# Patient Record
Sex: FEMALE | Race: WHITE | NOT HISPANIC OR LATINO | Employment: FULL TIME | URBAN - METROPOLITAN AREA
[De-identification: names, ages, dates, MRNs, and addresses within clinical notes are randomized per-mention and may not be internally consistent; named-entity substitution may affect disease eponyms.]

---

## 2017-01-04 ENCOUNTER — GENERIC CONVERSION - ENCOUNTER (OUTPATIENT)
Dept: OTHER | Facility: OTHER | Age: 47
End: 2017-01-04

## 2017-01-28 ENCOUNTER — LAB (OUTPATIENT)
Dept: LAB | Facility: HOSPITAL | Age: 47
End: 2017-01-28
Attending: OBSTETRICS & GYNECOLOGY
Payer: COMMERCIAL

## 2017-01-28 ENCOUNTER — TRANSCRIBE ORDERS (OUTPATIENT)
Dept: LAB | Facility: HOSPITAL | Age: 47
End: 2017-01-28

## 2017-01-28 DIAGNOSIS — Z01.818 OTHER SPECIFIED PRE-OPERATIVE EXAMINATION: ICD-10-CM

## 2017-01-28 DIAGNOSIS — Z01.818 OTHER SPECIFIED PRE-OPERATIVE EXAMINATION: Primary | ICD-10-CM

## 2017-01-28 LAB
ABO GROUP BLD: NORMAL
APTT PPP: 26 SECONDS (ref 24–36)
BASOPHILS # BLD AUTO: 0.02 THOUSANDS/ΜL (ref 0–0.1)
BASOPHILS NFR BLD AUTO: 0 % (ref 0–1)
BLD GP AB SCN SERPL QL: NEGATIVE
EOSINOPHIL # BLD AUTO: 0.22 THOUSAND/ΜL (ref 0–0.61)
EOSINOPHIL NFR BLD AUTO: 4 % (ref 0–6)
ERYTHROCYTE [DISTWIDTH] IN BLOOD BY AUTOMATED COUNT: 12.9 % (ref 11.6–15.1)
HCT VFR BLD AUTO: 39.7 % (ref 34.8–46.1)
HGB BLD-MCNC: 13.6 G/DL (ref 11.5–15.4)
INR PPP: 0.98 (ref 0.86–1.16)
LYMPHOCYTES # BLD AUTO: 1.48 THOUSANDS/ΜL (ref 0.6–4.47)
LYMPHOCYTES NFR BLD AUTO: 26 % (ref 14–44)
MCH RBC QN AUTO: 30 PG (ref 26.8–34.3)
MCHC RBC AUTO-ENTMCNC: 34.3 G/DL (ref 31.4–37.4)
MCV RBC AUTO: 88 FL (ref 82–98)
MONOCYTES # BLD AUTO: 0.48 THOUSAND/ΜL (ref 0.17–1.22)
MONOCYTES NFR BLD AUTO: 9 % (ref 4–12)
NEUTROPHILS # BLD AUTO: 3.47 THOUSANDS/ΜL (ref 1.85–7.62)
NEUTS SEG NFR BLD AUTO: 61 % (ref 43–75)
NRBC BLD AUTO-RTO: 0 /100 WBCS
PLATELET # BLD AUTO: 272 THOUSANDS/UL (ref 149–390)
PMV BLD AUTO: 12.2 FL (ref 8.9–12.7)
PROTHROMBIN TIME: 13.1 SECONDS (ref 12–14.3)
RBC # BLD AUTO: 4.53 MILLION/UL (ref 3.81–5.12)
RH BLD: POSITIVE
WBC # BLD AUTO: 5.68 THOUSAND/UL (ref 4.31–10.16)

## 2017-01-28 PROCEDURE — 86850 RBC ANTIBODY SCREEN: CPT

## 2017-01-28 PROCEDURE — 85610 PROTHROMBIN TIME: CPT

## 2017-01-28 PROCEDURE — 86920 COMPATIBILITY TEST SPIN: CPT

## 2017-01-28 PROCEDURE — 86900 BLOOD TYPING SEROLOGIC ABO: CPT

## 2017-01-28 PROCEDURE — 86901 BLOOD TYPING SEROLOGIC RH(D): CPT

## 2017-01-28 PROCEDURE — 36415 COLL VENOUS BLD VENIPUNCTURE: CPT

## 2017-01-28 PROCEDURE — 85025 COMPLETE CBC W/AUTO DIFF WBC: CPT

## 2017-01-28 PROCEDURE — 85730 THROMBOPLASTIN TIME PARTIAL: CPT

## 2017-01-31 ENCOUNTER — GENERIC CONVERSION - ENCOUNTER (OUTPATIENT)
Dept: OTHER | Facility: OTHER | Age: 47
End: 2017-01-31

## 2017-02-08 ENCOUNTER — ANESTHESIA EVENT (OUTPATIENT)
Dept: PERIOP | Facility: HOSPITAL | Age: 47
End: 2017-02-08
Payer: COMMERCIAL

## 2017-02-09 ENCOUNTER — ANESTHESIA (OUTPATIENT)
Dept: PERIOP | Facility: HOSPITAL | Age: 47
End: 2017-02-09
Payer: COMMERCIAL

## 2017-02-09 ENCOUNTER — HOSPITAL ENCOUNTER (OUTPATIENT)
Facility: HOSPITAL | Age: 47
Setting detail: OUTPATIENT SURGERY
Discharge: HOME | End: 2017-02-09
Attending: OBSTETRICS & GYNECOLOGY | Admitting: OBSTETRICS & GYNECOLOGY
Payer: COMMERCIAL

## 2017-02-09 VITALS
WEIGHT: 190 LBS | TEMPERATURE: 98.7 F | OXYGEN SATURATION: 97 % | BODY MASS INDEX: 29.82 KG/M2 | RESPIRATION RATE: 16 BRPM | HEIGHT: 67 IN | DIASTOLIC BLOOD PRESSURE: 78 MMHG | SYSTOLIC BLOOD PRESSURE: 133 MMHG | HEART RATE: 59 BPM

## 2017-02-09 DIAGNOSIS — N92.0 MENORRHAGIA: ICD-10-CM

## 2017-02-09 LAB — EXT PREGNANCY TEST URINE: NORMAL

## 2017-02-09 PROCEDURE — 88305 TISSUE EXAM BY PATHOLOGIST: CPT | Performed by: OBSTETRICS & GYNECOLOGY

## 2017-02-09 PROCEDURE — 81025 URINE PREGNANCY TEST: CPT | Performed by: OBSTETRICS & GYNECOLOGY

## 2017-02-09 RX ORDER — IBUPROFEN 400 MG/1
TABLET ORAL
Status: DISCONTINUED
Start: 2017-02-09 | End: 2017-02-09 | Stop reason: HOSPADM

## 2017-02-09 RX ORDER — KETOROLAC TROMETHAMINE 30 MG/ML
INJECTION, SOLUTION INTRAMUSCULAR; INTRAVENOUS AS NEEDED
Status: DISCONTINUED | OUTPATIENT
Start: 2017-02-09 | End: 2017-02-09 | Stop reason: SURG

## 2017-02-09 RX ORDER — ONDANSETRON 2 MG/ML
4 INJECTION INTRAMUSCULAR; INTRAVENOUS EVERY 6 HOURS PRN
Status: DISCONTINUED | OUTPATIENT
Start: 2017-02-09 | End: 2017-02-09 | Stop reason: HOSPADM

## 2017-02-09 RX ORDER — FENTANYL CITRATE/PF 50 MCG/ML
25 SYRINGE (ML) INJECTION
Status: DISCONTINUED | OUTPATIENT
Start: 2017-02-09 | End: 2017-02-09 | Stop reason: HOSPADM

## 2017-02-09 RX ORDER — FENTANYL CITRATE 50 UG/ML
INJECTION, SOLUTION INTRAMUSCULAR; INTRAVENOUS AS NEEDED
Status: DISCONTINUED | OUTPATIENT
Start: 2017-02-09 | End: 2017-02-09 | Stop reason: SURG

## 2017-02-09 RX ORDER — SODIUM CHLORIDE, SODIUM LACTATE, POTASSIUM CHLORIDE, CALCIUM CHLORIDE 600; 310; 30; 20 MG/100ML; MG/100ML; MG/100ML; MG/100ML
50 INJECTION, SOLUTION INTRAVENOUS CONTINUOUS
Status: DISCONTINUED | OUTPATIENT
Start: 2017-02-09 | End: 2017-02-09 | Stop reason: HOSPADM

## 2017-02-09 RX ORDER — ONDANSETRON 2 MG/ML
4 INJECTION INTRAMUSCULAR; INTRAVENOUS ONCE AS NEEDED
Status: DISCONTINUED | OUTPATIENT
Start: 2017-02-09 | End: 2017-02-09 | Stop reason: HOSPADM

## 2017-02-09 RX ORDER — LIDOCAINE HYDROCHLORIDE 10 MG/ML
INJECTION, SOLUTION INFILTRATION; PERINEURAL AS NEEDED
Status: DISCONTINUED | OUTPATIENT
Start: 2017-02-09 | End: 2017-02-09 | Stop reason: SURG

## 2017-02-09 RX ORDER — IBUPROFEN 800 MG/1
800 TABLET ORAL EVERY 6 HOURS PRN
Qty: 20 TABLET | Refills: 0 | Status: SHIPPED | OUTPATIENT
Start: 2017-02-09 | End: 2017-08-04 | Stop reason: ALTCHOICE

## 2017-02-09 RX ORDER — PROPOFOL 10 MG/ML
INJECTION, EMULSION INTRAVENOUS AS NEEDED
Status: DISCONTINUED | OUTPATIENT
Start: 2017-02-09 | End: 2017-02-09 | Stop reason: SURG

## 2017-02-09 RX ORDER — ONDANSETRON 2 MG/ML
INJECTION INTRAMUSCULAR; INTRAVENOUS AS NEEDED
Status: DISCONTINUED | OUTPATIENT
Start: 2017-02-09 | End: 2017-02-09 | Stop reason: SURG

## 2017-02-09 RX ORDER — IBUPROFEN 400 MG/1
800 TABLET ORAL EVERY 6 HOURS PRN
Status: DISCONTINUED | OUTPATIENT
Start: 2017-02-09 | End: 2017-02-09 | Stop reason: HOSPADM

## 2017-02-09 RX ORDER — MIDAZOLAM HYDROCHLORIDE 1 MG/ML
INJECTION INTRAMUSCULAR; INTRAVENOUS AS NEEDED
Status: DISCONTINUED | OUTPATIENT
Start: 2017-02-09 | End: 2017-02-09 | Stop reason: SURG

## 2017-02-09 RX ADMIN — SODIUM CHLORIDE, SODIUM LACTATE, POTASSIUM CHLORIDE, AND CALCIUM CHLORIDE 50 ML/HR: .6; .31; .03; .02 INJECTION, SOLUTION INTRAVENOUS at 09:57

## 2017-02-09 RX ADMIN — ONDANSETRON 4 MG: 2 INJECTION INTRAMUSCULAR; INTRAVENOUS at 12:05

## 2017-02-09 RX ADMIN — FENTANYL CITRATE 25 MCG: 50 INJECTION, SOLUTION INTRAMUSCULAR; INTRAVENOUS at 12:05

## 2017-02-09 RX ADMIN — MIDAZOLAM HYDROCHLORIDE 2 MG: 1 INJECTION, SOLUTION INTRAMUSCULAR; INTRAVENOUS at 11:49

## 2017-02-09 RX ADMIN — FENTANYL CITRATE 25 MCG: 50 INJECTION, SOLUTION INTRAMUSCULAR; INTRAVENOUS at 12:02

## 2017-02-09 RX ADMIN — PROPOFOL 200 MG: 10 INJECTION, EMULSION INTRAVENOUS at 11:56

## 2017-02-09 RX ADMIN — DEXAMETHASONE SODIUM PHOSPHATE 8 MG: 10 INJECTION INTRAMUSCULAR; INTRAVENOUS at 12:05

## 2017-02-09 RX ADMIN — KETOROLAC TROMETHAMINE 30 MG: 30 INJECTION, SOLUTION INTRAMUSCULAR at 12:19

## 2017-02-09 RX ADMIN — LIDOCAINE HYDROCHLORIDE 50 MG: 10 INJECTION, SOLUTION INFILTRATION; PERINEURAL at 11:56

## 2017-02-09 RX ADMIN — IBUPROFEN 800 MG: 400 TABLET ORAL at 14:02

## 2017-02-09 RX ADMIN — FENTANYL CITRATE 25 MCG: 50 INJECTION, SOLUTION INTRAMUSCULAR; INTRAVENOUS at 11:59

## 2017-02-09 RX ADMIN — FENTANYL CITRATE 25 MCG: 50 INJECTION, SOLUTION INTRAMUSCULAR; INTRAVENOUS at 12:18

## 2017-02-09 RX ADMIN — FENTANYL CITRATE 25 MCG: 50 INJECTION INTRAMUSCULAR; INTRAVENOUS at 12:45

## 2017-02-11 LAB
ABO GROUP BLD BPU: NORMAL
ABO GROUP BLD BPU: NORMAL
BPU ID: NORMAL
BPU ID: NORMAL
CROSSMATCH: NORMAL
CROSSMATCH: NORMAL
UNIT DISPENSE STATUS: NORMAL
UNIT DISPENSE STATUS: NORMAL
UNIT PRODUCT CODE: NORMAL
UNIT PRODUCT CODE: NORMAL
UNIT RH: NORMAL
UNIT RH: NORMAL

## 2017-02-13 ENCOUNTER — GENERIC CONVERSION - ENCOUNTER (OUTPATIENT)
Dept: OTHER | Facility: OTHER | Age: 47
End: 2017-02-13

## 2017-02-24 ENCOUNTER — ALLSCRIPTS OFFICE VISIT (OUTPATIENT)
Dept: OTHER | Facility: OTHER | Age: 47
End: 2017-02-24

## 2017-04-10 ENCOUNTER — GENERIC CONVERSION - ENCOUNTER (OUTPATIENT)
Dept: OTHER | Facility: OTHER | Age: 47
End: 2017-04-10

## 2017-05-15 ENCOUNTER — ALLSCRIPTS OFFICE VISIT (OUTPATIENT)
Dept: OTHER | Facility: OTHER | Age: 47
End: 2017-05-15

## 2017-05-15 PROCEDURE — G0145 SCR C/V CYTO,THINLAYER,RESCR: HCPCS | Performed by: OBSTETRICS & GYNECOLOGY

## 2017-05-16 ENCOUNTER — LAB REQUISITION (OUTPATIENT)
Dept: LAB | Facility: HOSPITAL | Age: 47
End: 2017-05-16
Payer: COMMERCIAL

## 2017-05-16 DIAGNOSIS — Z01.419 ENCOUNTER FOR GYNECOLOGICAL EXAMINATION WITHOUT ABNORMAL FINDING: ICD-10-CM

## 2017-05-22 LAB
LAB AP GYN PRIMARY INTERPRETATION: NORMAL
LAB AP LMP: NORMAL
Lab: NORMAL

## 2017-05-23 ENCOUNTER — GENERIC CONVERSION - ENCOUNTER (OUTPATIENT)
Dept: OTHER | Facility: OTHER | Age: 47
End: 2017-05-23

## 2017-07-24 ENCOUNTER — GENERIC CONVERSION - ENCOUNTER (OUTPATIENT)
Dept: OTHER | Facility: OTHER | Age: 47
End: 2017-07-24

## 2017-07-25 ENCOUNTER — TRANSCRIBE ORDERS (OUTPATIENT)
Dept: LAB | Facility: CLINIC | Age: 47
End: 2017-07-25

## 2017-07-25 ENCOUNTER — APPOINTMENT (OUTPATIENT)
Dept: LAB | Facility: CLINIC | Age: 47
End: 2017-07-25
Payer: COMMERCIAL

## 2017-07-25 DIAGNOSIS — N92.0 EXCESSIVE OR FREQUENT MENSTRUATION: ICD-10-CM

## 2017-07-25 DIAGNOSIS — N92.0 EXCESSIVE OR FREQUENT MENSTRUATION: Primary | ICD-10-CM

## 2017-07-25 LAB
BASOPHILS # BLD AUTO: 0.04 THOUSANDS/ΜL (ref 0–0.1)
BASOPHILS NFR BLD AUTO: 1 % (ref 0–1)
EOSINOPHIL # BLD AUTO: 0.26 THOUSAND/ΜL (ref 0–0.61)
EOSINOPHIL NFR BLD AUTO: 3 % (ref 0–6)
ERYTHROCYTE [DISTWIDTH] IN BLOOD BY AUTOMATED COUNT: 12.8 % (ref 11.6–15.1)
HCT VFR BLD AUTO: 43.8 % (ref 34.8–46.1)
HGB BLD-MCNC: 14.8 G/DL (ref 11.5–15.4)
LYMPHOCYTES # BLD AUTO: 1.65 THOUSANDS/ΜL (ref 0.6–4.47)
LYMPHOCYTES NFR BLD AUTO: 21 % (ref 14–44)
MCH RBC QN AUTO: 29.3 PG (ref 26.8–34.3)
MCHC RBC AUTO-ENTMCNC: 33.8 G/DL (ref 31.4–37.4)
MCV RBC AUTO: 87 FL (ref 82–98)
MONOCYTES # BLD AUTO: 0.75 THOUSAND/ΜL (ref 0.17–1.22)
MONOCYTES NFR BLD AUTO: 9 % (ref 4–12)
NEUTROPHILS # BLD AUTO: 5.34 THOUSANDS/ΜL (ref 1.85–7.62)
NEUTS SEG NFR BLD AUTO: 66 % (ref 43–75)
PLATELET # BLD AUTO: 298 THOUSANDS/UL (ref 149–390)
PMV BLD AUTO: 10.9 FL (ref 8.9–12.7)
RBC # BLD AUTO: 5.05 MILLION/UL (ref 3.81–5.12)
WBC # BLD AUTO: 8.04 THOUSAND/UL (ref 4.31–10.16)

## 2017-07-25 PROCEDURE — 36415 COLL VENOUS BLD VENIPUNCTURE: CPT

## 2017-07-25 PROCEDURE — 85025 COMPLETE CBC W/AUTO DIFF WBC: CPT

## 2017-07-26 ENCOUNTER — GENERIC CONVERSION - ENCOUNTER (OUTPATIENT)
Dept: OTHER | Facility: OTHER | Age: 47
End: 2017-07-26

## 2017-08-04 ENCOUNTER — HOSPITAL ENCOUNTER (EMERGENCY)
Facility: HOSPITAL | Age: 47
Discharge: HOME | End: 2017-08-04
Attending: EMERGENCY MEDICINE | Admitting: EMERGENCY MEDICINE
Payer: COMMERCIAL

## 2017-08-04 ENCOUNTER — APPOINTMENT (EMERGENCY)
Dept: RADIOLOGY | Facility: HOSPITAL | Age: 47
End: 2017-08-04
Payer: COMMERCIAL

## 2017-08-04 ENCOUNTER — LAB CONVERSION - ENCOUNTER (OUTPATIENT)
Dept: OTHER | Facility: OTHER | Age: 47
End: 2017-08-04

## 2017-08-04 VITALS
DIASTOLIC BLOOD PRESSURE: 65 MMHG | RESPIRATION RATE: 16 BRPM | WEIGHT: 200.62 LBS | SYSTOLIC BLOOD PRESSURE: 131 MMHG | TEMPERATURE: 98.2 F | BODY MASS INDEX: 31.42 KG/M2 | OXYGEN SATURATION: 95 % | HEART RATE: 97 BPM

## 2017-08-04 DIAGNOSIS — J20.9 ACUTE BRONCHITIS: Primary | ICD-10-CM

## 2017-08-04 LAB
ALBUMIN SERPL BCP-MCNC: 4.1 G/DL (ref 3.5–5)
ALP SERPL-CCNC: 96 U/L (ref 46–116)
ALT SERPL W P-5'-P-CCNC: 22 U/L (ref 12–78)
ANION GAP SERPL CALCULATED.3IONS-SCNC: 10 MMOL/L (ref 4–13)
APTT PPP: 26 SECONDS (ref 23–35)
AST SERPL W P-5'-P-CCNC: 14 U/L (ref 5–45)
BASOPHILS # BLD AUTO: 0.03 THOUSANDS/ΜL (ref 0–0.1)
BASOPHILS NFR BLD AUTO: 0 % (ref 0–1)
BILIRUB SERPL-MCNC: 0.7 MG/DL (ref 0.2–1)
BUN SERPL-MCNC: 12 MG/DL (ref 5–25)
CALCIUM SERPL-MCNC: 9.3 MG/DL (ref 8.3–10.1)
CHLORIDE SERPL-SCNC: 104 MMOL/L (ref 100–108)
CO2 SERPL-SCNC: 28 MMOL/L (ref 21–32)
CREAT SERPL-MCNC: 0.84 MG/DL (ref 0.6–1.3)
EOSINOPHIL # BLD AUTO: 0.48 THOUSAND/ΜL (ref 0–0.61)
EOSINOPHIL NFR BLD AUTO: 5 % (ref 0–6)
ERYTHROCYTE [DISTWIDTH] IN BLOOD BY AUTOMATED COUNT: 12.9 % (ref 11.6–15.1)
GFR SERPL CREATININE-BSD FRML MDRD: 84 ML/MIN/1.73SQ M
GLUCOSE SERPL-MCNC: 98 MG/DL (ref 65–140)
HCT VFR BLD AUTO: 40.7 % (ref 34.8–46.1)
HGB BLD-MCNC: 14 G/DL (ref 11.5–15.4)
INR PPP: 0.94 (ref 0.86–1.16)
LACTATE SERPL-SCNC: 0.9 MMOL/L (ref 0.5–2)
LYMPHOCYTES # BLD AUTO: 1.26 THOUSANDS/ΜL (ref 0.6–4.47)
LYMPHOCYTES NFR BLD AUTO: 12 % (ref 14–44)
MCH RBC QN AUTO: 29.6 PG (ref 26.8–34.3)
MCHC RBC AUTO-ENTMCNC: 34.4 G/DL (ref 31.4–37.4)
MCV RBC AUTO: 86 FL (ref 82–98)
MONOCYTES # BLD AUTO: 0.7 THOUSAND/ΜL (ref 0.17–1.22)
MONOCYTES NFR BLD AUTO: 7 % (ref 4–12)
NEUTROPHILS # BLD AUTO: 8.21 THOUSANDS/ΜL (ref 1.85–7.62)
NEUTS SEG NFR BLD AUTO: 76 % (ref 43–75)
PLATELET # BLD AUTO: 315 THOUSANDS/UL (ref 149–390)
PMV BLD AUTO: 11.2 FL (ref 8.9–12.7)
POTASSIUM SERPL-SCNC: 3.7 MMOL/L (ref 3.5–5.3)
PROT SERPL-MCNC: 7.9 G/DL (ref 6.4–8.2)
PROTHROMBIN TIME: 12.9 SECONDS (ref 12.1–14.4)
RBC # BLD AUTO: 4.73 MILLION/UL (ref 3.81–5.12)
SODIUM SERPL-SCNC: 142 MMOL/L (ref 136–145)
WBC # BLD AUTO: 10.68 THOUSAND/UL (ref 4.31–10.16)

## 2017-08-04 PROCEDURE — 36415 COLL VENOUS BLD VENIPUNCTURE: CPT | Performed by: EMERGENCY MEDICINE

## 2017-08-04 PROCEDURE — 85025 COMPLETE CBC W/AUTO DIFF WBC: CPT | Performed by: EMERGENCY MEDICINE

## 2017-08-04 PROCEDURE — 80053 COMPREHEN METABOLIC PANEL: CPT | Performed by: EMERGENCY MEDICINE

## 2017-08-04 PROCEDURE — 85730 THROMBOPLASTIN TIME PARTIAL: CPT | Performed by: EMERGENCY MEDICINE

## 2017-08-04 PROCEDURE — 83605 ASSAY OF LACTIC ACID: CPT | Performed by: EMERGENCY MEDICINE

## 2017-08-04 PROCEDURE — 71020 HB CHEST X-RAY 2VW FRONTAL&LATL: CPT

## 2017-08-04 PROCEDURE — 96374 THER/PROPH/DIAG INJ IV PUSH: CPT

## 2017-08-04 PROCEDURE — 94640 AIRWAY INHALATION TREATMENT: CPT

## 2017-08-04 PROCEDURE — 99285 EMERGENCY DEPT VISIT HI MDM: CPT

## 2017-08-04 PROCEDURE — 87040 BLOOD CULTURE FOR BACTERIA: CPT | Performed by: EMERGENCY MEDICINE

## 2017-08-04 PROCEDURE — 85610 PROTHROMBIN TIME: CPT | Performed by: EMERGENCY MEDICINE

## 2017-08-04 PROCEDURE — 93005 ELECTROCARDIOGRAM TRACING: CPT | Performed by: EMERGENCY MEDICINE

## 2017-08-04 RX ORDER — PROMETHAZINE HYDROCHLORIDE AND CODEINE PHOSPHATE 6.25; 1 MG/5ML; MG/5ML
5 SYRUP ORAL EVERY 4 HOURS PRN
Qty: 120 ML | Refills: 0 | Status: SHIPPED | OUTPATIENT
Start: 2017-08-04 | End: 2017-08-14

## 2017-08-04 RX ORDER — FLUTICASONE PROPIONATE 50 MCG
1 SPRAY, SUSPENSION (ML) NASAL ONCE
Status: COMPLETED | OUTPATIENT
Start: 2017-08-04 | End: 2017-08-04

## 2017-08-04 RX ORDER — LEVOFLOXACIN 500 MG/1
500 TABLET, FILM COATED ORAL DAILY
Qty: 7 TABLET | Refills: 0 | Status: SHIPPED | OUTPATIENT
Start: 2017-08-04 | End: 2017-08-11

## 2017-08-04 RX ORDER — ALBUTEROL SULFATE 90 UG/1
2 AEROSOL, METERED RESPIRATORY (INHALATION) EVERY 6 HOURS PRN
COMMUNITY
End: 2018-08-02 | Stop reason: ALTCHOICE

## 2017-08-04 RX ORDER — METHYLPREDNISOLONE SODIUM SUCCINATE 125 MG/2ML
125 INJECTION, POWDER, LYOPHILIZED, FOR SOLUTION INTRAMUSCULAR; INTRAVENOUS ONCE
Status: COMPLETED | OUTPATIENT
Start: 2017-08-04 | End: 2017-08-04

## 2017-08-04 RX ORDER — GUAIFENESIN/DEXTROMETHORPHAN 100-10MG/5
10 SYRUP ORAL ONCE
Status: COMPLETED | OUTPATIENT
Start: 2017-08-04 | End: 2017-08-04

## 2017-08-04 RX ORDER — ALBUTEROL SULFATE 2.5 MG/3ML
5 SOLUTION RESPIRATORY (INHALATION) ONCE
Status: COMPLETED | OUTPATIENT
Start: 2017-08-04 | End: 2017-08-04

## 2017-08-04 RX ORDER — ALBUTEROL SULFATE 2.5 MG/3ML
2.5 SOLUTION RESPIRATORY (INHALATION) EVERY 6 HOURS PRN
Qty: 75 ML | Refills: 0 | Status: SHIPPED | OUTPATIENT
Start: 2017-08-04 | End: 2018-08-02 | Stop reason: ALTCHOICE

## 2017-08-04 RX ORDER — METHYLPREDNISOLONE 4 MG/1
TABLET ORAL
Qty: 21 TABLET | Refills: 0 | Status: SHIPPED | OUTPATIENT
Start: 2017-08-04 | End: 2018-08-02 | Stop reason: ALTCHOICE

## 2017-08-04 RX ORDER — LEVOFLOXACIN 500 MG/1
500 TABLET, FILM COATED ORAL ONCE
Status: COMPLETED | OUTPATIENT
Start: 2017-08-04 | End: 2017-08-04

## 2017-08-04 RX ADMIN — ALBUTEROL SULFATE 5 MG: 2.5 SOLUTION RESPIRATORY (INHALATION) at 17:49

## 2017-08-04 RX ADMIN — FLUTICASONE PROPIONATE 1 SPRAY: 50 SPRAY, METERED NASAL at 17:49

## 2017-08-04 RX ADMIN — IPRATROPIUM BROMIDE 0.5 MG: 0.5 SOLUTION RESPIRATORY (INHALATION) at 17:49

## 2017-08-04 RX ADMIN — LEVOFLOXACIN 500 MG: 500 TABLET, FILM COATED ORAL at 18:44

## 2017-08-04 RX ADMIN — METHYLPREDNISOLONE SODIUM SUCCINATE 125 MG: 125 INJECTION, POWDER, FOR SOLUTION INTRAMUSCULAR; INTRAVENOUS at 17:49

## 2017-08-04 RX ADMIN — GUAIFENESIN AND DEXTROMETHORPHAN 10 ML: 100; 10 SYRUP ORAL at 17:49

## 2017-08-07 LAB
ATRIAL RATE: 79 BPM
P AXIS: 76 DEGREES
PR INTERVAL: 166 MS
QRS AXIS: 82 DEGREES
QRSD INTERVAL: 80 MS
QT INTERVAL: 386 MS
QTC INTERVAL: 442 MS
T WAVE AXIS: 73 DEGREES
VENTRICULAR RATE: 79 BPM

## 2017-08-09 LAB
BACTERIA BLD CULT: NORMAL
BACTERIA BLD CULT: NORMAL

## 2017-10-07 DIAGNOSIS — Z12.31 ENCOUNTER FOR SCREENING MAMMOGRAM FOR MALIGNANT NEOPLASM OF BREAST: ICD-10-CM

## 2017-11-20 ENCOUNTER — HOSPITAL ENCOUNTER (OUTPATIENT)
Dept: RADIOLOGY | Age: 47
Discharge: HOME | End: 2017-11-20
Payer: COMMERCIAL

## 2017-11-20 DIAGNOSIS — Z12.31 ENCOUNTER FOR SCREENING MAMMOGRAM FOR MALIGNANT NEOPLASM OF BREAST: ICD-10-CM

## 2017-11-20 PROCEDURE — G0202 SCR MAMMO BI INCL CAD: HCPCS

## 2017-11-20 PROCEDURE — 77063 BREAST TOMOSYNTHESIS BI: CPT

## 2018-01-10 NOTE — RESULT NOTES
Verified Results  (1) PT WITH INR 28Jan2017 09:14AM Antwon Molina     Test Name Result Flag Reference   INR 0 98  0 86-1 16   PT 13 1 seconds  12 0-14 3     (1) CBC/PLT/DIFF [id number] 09:14AM Antwon Molina     Test Name Result Flag Reference   WBC COUNT 5 68 Thousand/uL  4 31-10 16   RBC COUNT 4 53 Million/uL  3 81-5 12   HEMOGLOBIN 13 6 g/dL  11 5-15 4   HEMATOCRIT 39 7 %  34 8-46  1   MCV 88 fL  82-98   MCH 30 0 pg  26 8-34 3   MCHC 34 3 g/dL  31 4-37 4   RDW 12 9 %  11 6-15 1   MPV 12 2 fL  8 9-12 7   PLATELET COUNT 802 Thousands/uL  149-390   nRBC AUTOMATED 0 /100 WBCs     NEUTROPHILS RELATIVE PERCENT 61 %  43-75   LYMPHOCYTES RELATIVE PERCENT 26 %  14-44   MONOCYTES RELATIVE PERCENT 9 %  4-12   EOSINOPHILS RELATIVE PERCENT 4 %  0-6   BASOPHILS RELATIVE PERCENT 0 %  0-1   NEUTROPHILS ABSOLUTE COUNT 3 47 Thousands/?L  1 85-7 62   LYMPHOCYTES ABSOLUTE COUNT 1 48 Thousands/?L  0 60-4 47   MONOCYTES ABSOLUTE COUNT 0 48 Thousand/?L  0 17-1 22   EOSINOPHILS ABSOLUTE COUNT 0 22 Thousand/?L  0 00-0 61   BASOPHILS ABSOLUTE COUNT 0 02 Thousands/?L  0 00-0 10     (1) APTT [id number] 09:14AM Antwon Molina     Test Name Result Flag Reference   PARTIAL THROMBOPLASTIN TIME 26 seconds  24-36   Therapeutic Heparin Range = 60-90 seconds     (1) TYPE & SCREEN [id number] 09:14AM Antwon Molina     Test Name Result Flag Reference   ABO GROUPING B     RH FACTOR Positive     ANTIBODY SCREEN Negative

## 2018-01-10 NOTE — RESULT NOTES
Verified Results  MAMMO SCREENING BILATERAL W 3D & CAD 20Nov2017 06:51AM Dharmesh Soto Order Number: [id number]    - Patient Instructions: To schedule this appointment, please contact Central Scheduling at [phone]  Do not wear any perfume, powder, lotion or deodorant on breast or underarm area  Please bring your doctors order, referral (if needed) and insurance information with you on the day of the test  Failure to bring this information may result in this test being rescheduled  Arrive 15 minutes prior to your appointment time to register  On the day of your test, please bring any prior mammogram or breast studies with you that were not performed at a [facility]  Failure to bring prior exams may result in your test needing to be rescheduled  Test Name Result Flag Reference   MAMMO SCREENING BILATERAL W 3D & CAD (Report)     Patient History:   Family history of breast cancer at age 61 in paternal    grandmother, lung cancer at age 72 in mother  Took hormonal contraceptives for 4 years  Patient is a former smoker, and smoked for 16 years  Patient's    BMI is 29 0  Reason for exam: screening, asymptomatic  Mammo Screening Bilateral W DBT and CAD: November 20, 2017 -    Check In #: [de-identified]   2D/3D Procedure   3D views: Bilateral MLO view(s) were taken  2D views: Bilateral CC view(s) were taken  Technologist: RT Karlie(SANDRA)(M)   Prior study comparison: October 7, 2016, mammo screening    bilateral W DBT and CAD, performed at [facility]  October 2, 2015, digital bilateral screening    mammogram performed at [address] [place] [address]  September 19, 2014, digital bilateral screening mammogram performed at [address] [place] [address]  September 13, 2013, digital bilateral    screening mammogram performed at [address] [place] [address]      June 19, 2012, left breast unilateral diagnostic mammogram,    performed at [facility]  June 7, 2012,    digital bilateral screening mammogram performed at [address] [place] [address]  The breast tissue is heterogeneously dense, potentially limiting    the sensitivity of mammography  Patient risk, included in this    report, assists in determining the appropriate screening regimen    (such as 3-D mammography or the inclusion of automated breast    ultrasound or MRI)  3-D mammography may also remain indicated as    screening  No dominant soft tissue mass, architectural distortion or    suspicious calcifications are noted in either breast   The skin    and nipple contours are within normal limits  No significant changes when compared with prior studies  ACR BI-RADSï¾® Assessments: BiRad:1 - Negative     Recommendation:   Routine screening mammogram of both breasts in 1 year  A    reminder letter will be scheduled  The patient is scheduled in a reminder system for screening    mammography  8-10% of cancers will be missed on mammography  Management of a    palpable abnormality must be based on clinical grounds  Patients    will be notified of their results via letter from our facility  Accredited by [employer] and FDA       Transcription Location: [facility] 98: [id number]     Risk Value(s):   Tyrer-Cuzick 10 Year: 3 000%, Tyrer-Cuzick Lifetime: 15 200%,    Myriad Table: 1 5%, TERESA 5 Year: 0 9%, NCI Lifetime: 9 6%

## 2018-01-11 NOTE — RESULT NOTES
Verified Results  (1) THIN PREP PAP WITH IMAGING [id number] 12:00AM Leigh Ann Ion     Test Name Result Flag Reference   LAB AP CASE REPORT (Report)     Gynecologic Cytology Report            Case: [id number]                  Authorizing Provider: See Hoffman MD     Collected:      05/15/2017           First Screen:     ELSA Jaramillo    Received:      05/16/2017 1532        Specimen:  LIQUID-BASED PAP, SCREENING, Endocervical   LAB AP GYN PRIMARY INTERPRETATION      Negative for intraepithelial lesion or malignancy  Electronically signed by ELSA Jaramillo on 5/22/2017 at 3:43 PM   LAB AP GYN SPECIMEN ADEQUACY      Satisfactory for evaluation  Scant cellularity  LAB AP GYN ADDITIONAL INFORMATION (Report)     [employer]'s FDA approved ,  and ThinPrep Imaging System are   utilized with strict adherence to the 's instruction manual to   prepare gynecologic and non-gynecologic cytology specimens for the   production of ThinPrep slides as well as for gynecologic ThinPrep imaging  These processes have been validated by our laboratory and/or by the     The Pap test is not a diagnostic procedure and should not be used as the   sole means to detect cervical cancer  It is only a screening procedure to   aid in the detection of cervical cancer and its precursors  Both   false-negative and false-positive results have been experienced  Your   patient's test result should be interpreted in this context together with   the history and clinical findings     LAB AP LMP 5/14/2017

## 2018-01-11 NOTE — RESULT NOTES
Verified Results  (1) CBC/PLT/DIFF [id number] 09:58AM Garret Saulgrim     Test Name Result Flag Reference   WBC COUNT 8 04 Thousand/uL  4 31-10 16   RBC COUNT 5 05 Million/uL  3 81-5 12   HEMOGLOBIN 14 8 g/dL  11 5-15 4   HEMATOCRIT 43 8 %  34 8-46  1   MCV 87 fL  82-98   MCH 29 3 pg  26 8-34 3   MCHC 33 8 g/dL  31 4-37 4   RDW 12 8 %  11 6-15 1   MPV 10 9 fL  8 9-12 7   PLATELET COUNT 024 Thousands/uL  149-390   NEUTROPHILS RELATIVE PERCENT 66 %  43-75   LYMPHOCYTES RELATIVE PERCENT 21 %  14-44   MONOCYTES RELATIVE PERCENT 9 %  4-12   EOSINOPHILS RELATIVE PERCENT 3 %  0-6   BASOPHILS RELATIVE PERCENT 1 %  0-1   NEUTROPHILS ABSOLUTE COUNT 5 34 Thousands/? ??L  1 85-7 62   LYMPHOCYTES ABSOLUTE COUNT 1 65 Thousands/? ??L  0 60-4 47   MONOCYTES ABSOLUTE COUNT 0 75 Thousand/? ??L  0 17-1 22   EOSINOPHILS ABSOLUTE COUNT 0 26 Thousand/? ??L  0 00-0 61   BASOPHILS ABSOLUTE COUNT 0 04 Thousands/? ??L  0 00-0 10   This bloodwork is non-fasting  Please drink two glasses of water morning of  bloodwork

## 2018-01-12 NOTE — RESULT NOTES
Verified Results  (1) TISSUE EXAM [id number] 12:01PM Gallo Lawler     Test Name Result Flag Reference   LAB AP CASE REPORT (Report)     Surgical Pathology Report             Case: [id number]                   Authorizing Provider: Daniel Woodall MD     Collected:      02/09/2017 1201        Ordering Location:   [address] [place] [address]   Received:      02/09/2017 Tika Aldana [facility] Operating Room                            Pathologist:      Jacob Marks DO                               Specimen:  Endometrium, EMC, polyp   LAB AP FINAL DIAGNOSIS (Report)     A  Endometrium, curetting:  - Endometrial polyp in a background of secretory changes, no atypia   identified  - Benign strips of endocervical glandular cells  Interpretation performed at [facility], [place] [address]    Electronically signed by Jacob Marks DO on 2/10/2017 at 10:56 AM   LAB AP SURGICAL ADDITIONAL INFORMATION (Report)     These tests were developed and their performance characteristics   determined by [facility] or [facility]  They may not be cleared or approved by the [place]  Food and   Drug Administration  The FDA has determined that such clearance or   approval is not necessary  These tests are used for clinical purposes  They should not be regarded as investigational or for research  This   laboratory has been approved by CLIA 88, designated as a high-complexity   laboratory and is qualified to perform these tests  LAB AP GROSS DESCRIPTION (Report)     A  The specimen is received in formalin, labeled with the patient's name   and hospital number, and is designated [facility]  The specimen consists of   multiple tan to pink to red to colorless mucinous, hemorrhagic and soft   tissue fragments measuring in aggregate 3 x 2 x 0 5 cm  Entirely   submitted  Two cassettes      Note: The estimated total formalin fixation time based upon information   provided by the submitting clinician and the standard processing schedule   is 8 5 hours      MAC

## 2018-01-13 VITALS
SYSTOLIC BLOOD PRESSURE: 112 MMHG | WEIGHT: 200 LBS | BODY MASS INDEX: 30.31 KG/M2 | HEIGHT: 68 IN | DIASTOLIC BLOOD PRESSURE: 74 MMHG

## 2018-01-13 VITALS
BODY MASS INDEX: 33.59 KG/M2 | HEIGHT: 67 IN | WEIGHT: 214 LBS | SYSTOLIC BLOOD PRESSURE: 128 MMHG | DIASTOLIC BLOOD PRESSURE: 78 MMHG

## 2018-01-13 NOTE — RESULT NOTES
Verified Results  (B) PAP (REFLEX TO HPV PLUS WHEN ASC-US) [id number] 03:14PM Leala Falling     Test Name Result Flag Reference   PAP, LIQUID-BASED NILM     DIAGNOSIS:            Negative for intraepithelial lesion or malignancy  ADEQUACY:             Satisfactory for evaluation /                         Endocervical/transformation zone component                         present  COMMENT:              This Pap smear was screened with the assistance                         of the [employer]Prep(TM) Imaging System and                         screened by a cytotechnologist   SPECIMEN SOURCE:      PAP (RFLX HPV PLUS WHENASC-US), CERVIX  CLINICAL INFORMATION: LMP: N/A                        Provided Diagnosis Codes: Z01 419, V72 31                                                Cervicovaginal cytology should be considered a                         screening procedure subject to false negatives                         and false positives  Results are more reliable                         when a satisfactory sample is obtained on a                         regular repetitive basis, and should be                         interpreted together with past and current                         clinical data    ELECTRONICALLY SIGNED   BY:                   Screened By: ELSA Stein (ASCP)   Case                         Electronically Signed 04/20/2016

## 2018-01-13 NOTE — MISCELLANEOUS
Message   Recorded as Task   Date: 03/27/2017 12:38 PM, Created By: Court Evangelista   Task Name: Miscellaneous   Assigned To: Jacinto Ravi   Regarding Patient: Gayle Sadler, Status: In Progress   Comment:    Xenia Moon - 27 Mar 2017 12:38 PM     TASK CREATED  Caller: Self; Other; [phone] (Home)  Pt called to update status of menses since ablation on 02/09  Pt states that she did get menses that lasted 7 days and was much lighter than it has been in the last few years, however, it was heavier than just needing a panty liner  Pt states she did not have the bloating or cramping that she usually gets  Bernabe Lorraine - 31 Mar 2017 3:06 PM     TASK REPLIED TO: Previously Assigned To [facility],Team        Call again after her next period  Adelita Ponce - 31 Mar 2017 3:07 PM     TASK IN PROGRESS   Adelita Ponce - 31 Mar 2017 3:09 PM     TASK EDITED  tried to reach pt, but mailbox is full and cannot receive messages   Adelita Ponce - 03 Apr 2017 12:57 PM     TASK EDITED  tried again     unable to reach pt   Adelita Ponce - 03 Apr 2017 12:57 PM     TASK EDITED   Rosenda Ramos - 10 Apr 2017 12:50 PM     TASK IN PROGRESS   Rosenda Ramos - 10 Apr 2017 12:53 PM     TASK EDITED  Called pt - states she had bleeding for 2 days last week -  it was very light  Feels fine and has no concerns at this time  Will be seeing R87 next month in May  Active Problems    1  Encounter for routine gynecological examination (V72 31) (Z01 419)   2  Encounter for screening mammogram for malignant neoplasm of breast (V76 12)   (Z12 31)   3  Pre-op testing (V72 84) (Z01 818)   4  Routine Gynecological Exam With Cervical Pap Smear (V72 31)   5  Screening for human papillomavirus (HPV) (V73 81) (Z11 51)    Current Meds   1  No Reported Medications Recorded    Allergies    1   No Known Drug Allergies    Signatures   Electronically signed by : Mel Escamilla, ; Apr 10 2017 12:53PM EST (Author)

## 2018-01-15 NOTE — MISCELLANEOUS
Message   Recorded as Task   Date: 07/20/2017 08:47 AM, Created By: Timbo Roawn   Task Name: Call Back   Assigned To: Dick Mcgraw   Regarding Patient: Marcia Avalos, Status: Active   Comment:    Scoobyswapna Anum - 20 Jul 2017 8:47 AM     TASK CREATED  Caller: Self; [phone] (Home); [phone] x,,,,, (Work)  PT HAD AN ABLATION IN FEB, STILL BLEEDS A LOT,ABOUT 3 WKS OUT OF THE MONTH PLEASE CALL HER AS WHAT TO DO NEXT, SHE WANTS TO TALK TO [facility],[phone]   Trea Crew - 21 Jul 2017 4:08 PM     TASK REPLIED TO: Previously Assigned To [facility],Team     call Roshan Francisco to see me here next week to evaluate the next step with pelvic exam Order cbc and diff  to evaluate blood loss  Jose De Jesus April - 24 Jul 2017 9:03 AM     TASK EDITED  Pt given apt in Aug - is away on vac next week  Slip for cbc faxed to [facility] [place]        Active Problems    1  Encounter for gynecological examination without abnormal finding (V72 31) (Z01 419)   2  Encounter for routine gynecological examination (V72 31) (Z01 419)   3  Encounter for screening mammogram for malignant neoplasm of breast (V76 12)   (Z12 31)   4  Menorrhagia (626 2) (N92 0)   5  Pre-op testing (V72 84) (Z01 818)   6  Screening for human papillomavirus (HPV) (V73 81) (Z11 51)    Current Meds   1  No Reported Medications Recorded    Allergies    1  No Known Drug Allergies    Plan  Menorrhagia    · (1) CBC/PLT/DIFF; Status:Active - Retrospective Authorization; Requested for:[id number];      Signatures   Electronically signed by :  Blanca Esteban, ; Jul 24 2017  9:03AM EST                       (Author)

## 2018-01-16 NOTE — PROGRESS NOTES
Preliminary Nursing Report                Patient Information    Initial Encounter Entry Date:   2017 10:35 AM EST (Automated Transmission Automated Transmission)       Last Modified:   {Sophie Yuan}              Legal Name: Nixon Pop        Social Security Number:        YOB: 1970        Age (years): 40        Gender: F        Body Mass Index (BMI): 34 kg/m2        Height: 67 in  Weight: 214 lbs (97 kgs)           Address:   [address]              Phone: [phone]   (consent to leave messages)        Email:        Ethnicity: Decline to State        Mormon:        Marital Status:        Preferred Language: English        Race: Other Race                    Patient Insurance Information        Primary Insurance Information Carrier Name: {Primary  CarrierName}           Carrier Address:   {Primary  CarrierAddress}              Carrier Phone: {Primary  CarrierPhone}          Group Number: {Primary  GroupNumber}          Policy Number: {Primary  PolicyNumber}          Insured Name: {Primary  InsuredName}          Insured : {Primary  InsuredDOB}          Relationship to Insured: {Primary  RelationshiptoInsured}           Secondary Insurance Information Carrier Name: {Secondary  CarrierName}           Carrier Address:   {Secondary  CarrierAddress}              Carrier Phone: {Secondary  CarrierPhone}          Group Number: {Secondary  GroupNumber}          Policy Number: {Secondary  PolicyNumber}          Insured Name: {Secondary  InsuredName}          Insured : {Secondary  InsuredDOB}          Relationship to Insured: {Secondary  RelationshiptoInsured}                       Health Profile   Booking #:   Severiano Abler #: [id number]               DOS: 2017    Surgery : Endometrial ablation, thermal, without hysteroscopic guidance    Add'l Procedures/Notes:     Surgery Risk: Intermediate          Precautions          Allergies    No Known Drug Allergies             Medications    No Reported Medications               Conditions    Encounter for routine gynecological examination       Encounter for screening mammogram for malignant neoplasm of breast       Routine Gynecological Exam With Cervical Pap Smear       Screening for human papillomavirus (HPV)               Family History    None             Surgical History    None             Social History    Being A Social Drinker       Daily Coffee Consumption (2 Cups/Day)       Exercising Regularly       Former smoker       Marital History - Currently                                Patient Instructions       ? NPO Instructions   The day before surgery it is recommended to have a light dinner at your usual time and you are allowed a light snack early in the evening  Do not eat anything heavy or eat a big meal after 7pm  Do not eat or drink anything after midnight prior to your surgery  If you are supposed to take any of your medications, do so with a sip of water  Failure to follow these instructions can lead to an increased risk of lung complications and may result in a delay or cancellation of your procedure  If you have any questions, contact your institution for further instructions  No candy, no gum, no mints, no chewing tobacco   Triggered by: Medical Procedure Risk               Testing Considerations       ? Complete Blood Count (CBC) t, client, client  If test was completed and normal within last six months, repeat test is not necessary  Triggered by: Age or Facility Rec         ? Pregnancy Test t  Consider a urine pregnancy test on the morning of surgery  Triggered by: Age or Facility Rec, Gender         ? Type and Screen client  Type and Screen - Blood: If there is anticipated or possible large blood loss with this procedure, then a Type and Screen for Blood should be ordered  Triggered by: Age or Facility Rec               Consultations       No recommendations for this classification  Miscellaneous Questions         Question: Are you able to walk up a flight of stairs, walk up a hill or do heavy housework WITHOUT having chest pain or shortness of breath? Answer: YES                   Allergies/Conditions/Medications Not Found        The following were not recognized by our system when generating the recommendations  Please consider if this would impact any preoperative protocols  ? Being A Social Drinker       ? Daily Coffee Consumption (2 Cups/Day)       ? Encounter for screening mammogram for malignant neoplasm of breast       ? Exercising Regularly       ? Marital History - Currently        ? Routine Gynecological Exam With Cervical Pap Smear                  Appointment Information         Date:    02/09/2017        Location:    [place]        Address:           Directions:                      Footnotes revision 14      ?? Denotes a free-text entry  Legal Disclaimer: Any and all recommendations and services provided herein are designed to assist in the preoperative care of the patient  Nothing contained herein is designed to replace, eliminate or alleviate the responsibility of the attending physician to supervise and determine the patient?s preoperative care and course of treatment  Failure to provide complete, accurate information may negatively impact the system?s ability to recommend the proper preoperative protocol  THE ATTENDING PHYSICIAN IS RESPONSIBLE TO REVIEW THE SUGGESTED PREOPERATIVE PROTOCOLS/COURSE OF TREATMENT AND PRESCRIBE THE FINAL COURSE OF PREOPERATIVE TREATMENT IN CONSULTATION WITH THE PATIENT  THE [employer] SYSTEM AND ITS MATERIALS ARE PROVIDED ? AS IS? WITHOUT WARRANTY OF ANY KIND, EXPRESS OR IMPLIED, INCLUDING, BUT NOT LIMITED TO, WARRANTIES OF PERFORMANCE OR MERCHANTABILITY OR FITNESS FOR A PARTICULAR PURPOSE   PATIENT AND PHYSICIANS HEREBY AGREE THAT THEIR USE OF THE MATERIALS AND RESOURCES ACT AS A CONSENT TO RELEASE AND WAIVE Joaquin Marrero FROM ANY AND ALL CLAIMS OF WARRANTY, TORT OR CONTRACT LAW OF ANY KIND             Electronically signed by:Alexy Jacobson MD  Jan 4 2017  3:37PM EST

## 2018-01-17 NOTE — RESULT NOTES
Verified Results  MAMMO SCREENING BILATERAL W 3D & CAD [id number] 11:50AM Aviva Forbes Order Number: [id number]     Test Name Result Flag Reference   MAMMO SCREENING BILATERAL W 3D & CAD (Report)     Patient History:   Family history of breast cancer in paternal grandmother at age    61  Took hormonal contraceptives for 4 years  Patient is a former smoker, and smoked for 16 years  Patient's    BMI is 29 0  Reason for exam: screening (asymptomatic)  Mammo Screening Bilateral W DBT and CAD: October 7, 2016 - Check    In #: [de-identified]   2D/3D Procedure   3D views: Bilateral MLO view(s) were taken  2D views: Bilateral MLO, CC, and XCCL view(s) were taken  Technologist: SANDRA Frost (SANDRA)(M)   Prior study comparison: October 2, 2015, digital bilateral    screening mammogram, performed at [address] [place] [address]  September 19, 2014, digital bilateral screening mammogram,    performed at [address] [place] [address]  September 13, 2013,    digital bilateral screening mammogram, performed at [address] [place] [address]  June 19, 2012, left breast unilateral    diagnostic mammogram, performed at [address] [place] [address]  June 7, 2012, digital bilateral screening mammogram,    performed at [address] [place] [address]  January 7, 2011,    bilateral WB digitl bilat dayana, performed at [address] [place] [address]  The breast tissue is heterogeneously dense, potentially limiting    the sensitivity of mammography  Patient risk, included in this    report, assists in determining the appropriate screening regimen    (such as 3-D mammography or the inclusion of automated breast    ultrasound or MRI)  3-D mammography may also remain indicated as    screening  A combination of mediolateral oblique 3-D tomographic   slices as well as standard two-dimensional orthogonal images    were obtained  The parenchymal pattern appears stable   No dominant soft tissue    mass or suspicious calcifications are noted  The skin and nipple   contours are within normal limits  No mammographic evidence of malignancy  No    significant changes when compared with prior studies  ASSESSMENT: BiRad:1 - Negative     Recommendation:   Routine screening mammogram in 1 year  A reminder letter will be   scheduled  8-10% of cancers will be missed on mammography  Management of a    palpable abnormality must be based on clinical grounds  Patients    will be notified of their results via letter from our facility  Accredited by [employer] and FDA       Transcription Location: [facility] 98: [id number]     Risk Value(s):   Tyrer-Cuzick 10 Year: 2 868%, Tyrer-Cuzick Lifetime: 15 430%,    Myriad Table: 1 5%, TERESA 5 Year: 0 8%, NCI Lifetime: 9 7%   Signed by:   Hunter Gibbons MD   10/7/16

## 2018-01-18 NOTE — MISCELLANEOUS
Message   Recorded as Task   Date: 07/26/2017 12:48 PM, Created By: Patricia Castle   Task Name: Follow Up   Assigned To: Kodi Marroquin   Regarding Patient: Luiz Mcdonald, Status: In Progress   Patriciajulitakeyur Sanfordshorty - 26 Jul 2017 12:48 PM     TASK CREATED  Call Neri Ma her blood count showed no anemia  Rosenda Ramos - 26 Jul 2017 12:49 PM     TASK IN PROGRESS   Rosenda Ramos - 26 Jul 2017 12:51 PM     TASK EDITED  Patient is aware of results  Active Problems    1  Encounter for gynecological examination without abnormal finding (V72 31) (Z01 419)   2  Encounter for routine gynecological examination (V72 31) (Z01 419)   3  Encounter for screening mammogram for malignant neoplasm of breast (V76 12)   (Z12 31)   4  Menorrhagia (626 2) (N92 0)   5  Pre-op testing (V72 84) (Z01 818)   6  Screening for human papillomavirus (HPV) (V73 81) (Z11 51)    Current Meds   1  No Reported Medications Recorded    Allergies    1   No Known Drug Allergies    Signatures   Electronically signed by : Geovanny Lockwood, ; Jul 26 2017 12:51PM EST                       (Author)

## 2018-06-25 ENCOUNTER — TRANSCRIBE ORDERS (OUTPATIENT)
Dept: LAB | Facility: CLINIC | Age: 48
End: 2018-06-25

## 2018-06-25 ENCOUNTER — APPOINTMENT (OUTPATIENT)
Dept: LAB | Facility: CLINIC | Age: 48
End: 2018-06-25
Payer: COMMERCIAL

## 2018-06-25 ENCOUNTER — ANNUAL EXAM (OUTPATIENT)
Dept: OBGYN CLINIC | Facility: CLINIC | Age: 48
End: 2018-06-25
Payer: COMMERCIAL

## 2018-06-25 VITALS
DIASTOLIC BLOOD PRESSURE: 74 MMHG | SYSTOLIC BLOOD PRESSURE: 142 MMHG | BODY MASS INDEX: 31.39 KG/M2 | HEIGHT: 67 IN | WEIGHT: 200 LBS

## 2018-06-25 DIAGNOSIS — Z01.419 ENCOUNTER FOR GYNECOLOGICAL EXAMINATION (GENERAL) (ROUTINE) WITHOUT ABNORMAL FINDINGS: ICD-10-CM

## 2018-06-25 DIAGNOSIS — N92.4 MENORRHAGIA, PREMENOPAUSAL: ICD-10-CM

## 2018-06-25 DIAGNOSIS — N92.4 MENORRHAGIA, PREMENOPAUSAL: Primary | ICD-10-CM

## 2018-06-25 DIAGNOSIS — Z12.31 ENCOUNTER FOR SCREENING MAMMOGRAM FOR MALIGNANT NEOPLASM OF BREAST: Primary | ICD-10-CM

## 2018-06-25 LAB
BASOPHILS # BLD AUTO: 0.04 THOUSANDS/ΜL (ref 0–0.1)
BASOPHILS NFR BLD AUTO: 1 % (ref 0–1)
EOSINOPHIL # BLD AUTO: 0.33 THOUSAND/ΜL (ref 0–0.61)
EOSINOPHIL NFR BLD AUTO: 4 % (ref 0–6)
ERYTHROCYTE [DISTWIDTH] IN BLOOD BY AUTOMATED COUNT: 12.9 % (ref 11.6–15.1)
HCT VFR BLD AUTO: 42.7 % (ref 34.8–46.1)
HGB BLD-MCNC: 14.5 G/DL (ref 11.5–15.4)
LYMPHOCYTES # BLD AUTO: 1.77 THOUSANDS/ΜL (ref 0.6–4.47)
LYMPHOCYTES NFR BLD AUTO: 23 % (ref 14–44)
MCH RBC QN AUTO: 30 PG (ref 26.8–34.3)
MCHC RBC AUTO-ENTMCNC: 34 G/DL (ref 31.4–37.4)
MCV RBC AUTO: 88 FL (ref 82–98)
MONOCYTES # BLD AUTO: 0.78 THOUSAND/ΜL (ref 0.17–1.22)
MONOCYTES NFR BLD AUTO: 10 % (ref 4–12)
NEUTROPHILS # BLD AUTO: 4.81 THOUSANDS/ΜL (ref 1.85–7.62)
NEUTS SEG NFR BLD AUTO: 62 % (ref 43–75)
PLATELET # BLD AUTO: 282 THOUSANDS/UL (ref 149–390)
PMV BLD AUTO: 11.3 FL (ref 8.9–12.7)
RBC # BLD AUTO: 4.83 MILLION/UL (ref 3.81–5.12)
TSH SERPL DL<=0.05 MIU/L-ACNC: 3.29 UIU/ML (ref 0.36–3.74)
WBC # BLD AUTO: 7.73 THOUSAND/UL (ref 4.31–10.16)

## 2018-06-25 PROCEDURE — G0145 SCR C/V CYTO,THINLAYER,RESCR: HCPCS | Performed by: OBSTETRICS & GYNECOLOGY

## 2018-06-25 PROCEDURE — S0612 ANNUAL GYNECOLOGICAL EXAMINA: HCPCS | Performed by: OBSTETRICS & GYNECOLOGY

## 2018-06-25 PROCEDURE — 85025 COMPLETE CBC W/AUTO DIFF WBC: CPT

## 2018-06-25 PROCEDURE — 36415 COLL VENOUS BLD VENIPUNCTURE: CPT

## 2018-06-25 PROCEDURE — 84443 ASSAY THYROID STIM HORMONE: CPT

## 2018-06-25 NOTE — PATIENT INSTRUCTIONS
This 18-year-old patient was told that her breast and pelvic exam are normal  I have ordered a serum TSH and CBC and diff to determine if she is anemic or for thyroid place a well on her heavy menses

## 2018-06-25 NOTE — PROGRESS NOTES
Assessment/Plan: This 78-year-old patient was seen today for annual gyn evaluation  She is having heavy menses  Subjective:      Patient ID: Aldair Ferguson is a 53 y o  female  This 78-year-old patient has had regular monthly menses lasting about 5 days  Two days are extremely heavy requiring changing pads every hour to  She does not bleed between periods  She has had heavy periods since April of year ago  She did have a D and C and endometrial ablation 2017 at which time an endometrial polyp was identified  She does have a problem with oral contraceptives producing significant emotional problems  Her mother  recently and this has stressed her emotionally  She does not sleep well and uses an over-the-counter medication at times which does help her get a good night's sleep  She may wake up at night and stay wake her 0 5 hour to an hour at a time  She usually is in bed from 11 night till 5 in the morning  She has noticed considerable of fatigue use result blood loss and not sleeping well  She does wear a liner daily for urine stress incontinence  She has had no urinary tract infections over the year  Her bowel function is normal             Review of Systems   Constitutional: Negative  HENT: Negative  Eyes: Negative  Respiratory: Negative  Cardiovascular: Negative  Gastrointestinal: Negative  Endocrine: Negative  Genitourinary: Negative  Musculoskeletal: Negative  Skin: Negative  Allergic/Immunologic: Negative  Neurological: Negative  Hematological: Negative  Psychiatric/Behavioral: Negative  Objective:      /74 (BP Location: Left arm, Patient Position: Sitting, Cuff Size: Standard)   Ht 5' 7" (1 702 m)   Wt 90 7 kg (200 lb)   LMP 2018 (Within Days)   BMI 31 32 kg/m²          Physical Exam   Constitutional: She is oriented to person, place, and time  She appears well-developed and well-nourished     HENT:   Head: Normocephalic  Neck: Normal range of motion  Neck supple  Cardiovascular: Normal rate, regular rhythm, normal heart sounds and intact distal pulses  Pulmonary/Chest: Effort normal and breath sounds normal    Abdominal: Soft  Bowel sounds are normal    Genitourinary: Uterus normal    Musculoskeletal: Normal range of motion  Neurological: She is alert and oriented to person, place, and time  Skin: Skin is warm and dry  Psychiatric: She has a normal mood and affect  Nursing note and vitals reviewed  breast exam is normal   Pelvic exam reveals uterus to be anterior mobile normal size and well supported  No adnexal masses are identified  There is no blood in the vagina at this time  The cervix is normal  The vulva is normal  Rectal exam shows no masses or blood in the rectum  There are no nodular areas between the rectum and vagina

## 2018-06-26 ENCOUNTER — TELEPHONE (OUTPATIENT)
Dept: OBGYN CLINIC | Facility: CLINIC | Age: 48
End: 2018-06-26

## 2018-06-26 NOTE — TELEPHONE ENCOUNTER
----- Message from Rocco Pena MD sent at 6/26/2018  9:20 AM EDT -----  Please call the patient regarding her blood tests  Her blood count is be very good she is not anemic  Her thyroid tests showed no under activity of the thyroid gland  We may be able to diminish her bleeding with that medication that inhibits estrogen production from her ovaries and therefore may make her experience of premature menopause this should be safe based on her history because it actually decreases output of hormones  This medication comes in the form of an injection let me know if we should investigate whether this will be covered by her insurance her medications called depo-lupron

## 2018-06-29 LAB
LAB AP GYN PRIMARY INTERPRETATION: NORMAL
LAB AP LMP: NORMAL
Lab: NORMAL

## 2018-08-02 ENCOUNTER — OFFICE VISIT (OUTPATIENT)
Dept: FAMILY MEDICINE CLINIC | Facility: CLINIC | Age: 48
End: 2018-08-02
Payer: COMMERCIAL

## 2018-08-02 VITALS
RESPIRATION RATE: 16 BRPM | DIASTOLIC BLOOD PRESSURE: 64 MMHG | SYSTOLIC BLOOD PRESSURE: 128 MMHG | HEART RATE: 86 BPM | BODY MASS INDEX: 35.5 KG/M2 | WEIGHT: 226.2 LBS | HEIGHT: 67 IN | TEMPERATURE: 97.7 F

## 2018-08-02 DIAGNOSIS — R53.83 OTHER FATIGUE: ICD-10-CM

## 2018-08-02 DIAGNOSIS — F41.9 ANXIETY: Primary | ICD-10-CM

## 2018-08-02 DIAGNOSIS — E78.00 HYPERCHOLESTEROLEMIA: ICD-10-CM

## 2018-08-02 PROCEDURE — 3008F BODY MASS INDEX DOCD: CPT | Performed by: FAMILY MEDICINE

## 2018-08-02 PROCEDURE — 99203 OFFICE O/P NEW LOW 30 MIN: CPT | Performed by: FAMILY MEDICINE

## 2018-08-02 RX ORDER — ESCITALOPRAM OXALATE 10 MG/1
10 TABLET ORAL DAILY
Qty: 30 TABLET | Refills: 3 | Status: SHIPPED | OUTPATIENT
Start: 2018-08-02 | End: 2018-11-16 | Stop reason: SDUPTHER

## 2018-08-02 NOTE — PROGRESS NOTES
Patient ID: Priscilla San is a 53 y o  female  HPI: 53 y  o female presenting to get established  She complains of feeling anxious and having mood swings  For he last6+ mos aftet the passing of her  mother  She denies any depression or suididalityl    She also feels fatigued  I spoke with patient about having some labs drawn, but she is interested  SUBJECTIVE    Family History   Problem Relation Age of Onset   Angélica Munoz Cancer Mother     Hypothyroidism Mother     Lung cancer Mother     Heart disease Father     Venous thrombosis Father         of Deep Vessels of Lower Extremity    Aortic aneurysm Father     Breast cancer Paternal Grandmother     Hypertension Paternal Grandfather     Breast cancer Maternal Aunt      Social History     Social History    Marital status: /Civil Union     Spouse name: N/A    Number of children: N/A    Years of education: N/A     Occupational History    Not on file  Social History Main Topics    Smoking status: Former Smoker     Packs/day: 1 00     Years: 0 00    Smokeless tobacco: Former User     Quit date:     Alcohol use Yes      Comment: socially    Drug use: No    Sexual activity: Yes     Partners: Male     Other Topics Concern    Not on file     Social History Narrative    Daily coffee consumption , 2 cups/day    Exercise regularly         Past Medical History:   Diagnosis Date    Asthma     Dysfunctional uterine bleeding      Past Surgical History:   Procedure Laterality Date    DILATION AND CURETTAGE OF UTERUS      DILATION AND CURETTAGE OF UTERUS N/A 2017    Procedure: DILATATION AND CURETTAGE (D&C); Surgeon: Herrera Lake MD;  [facility];  Service:     ENDOMETRIAL ABLATION N/A 2017    Procedure: Litzy Natarajan;  Surgeon: Herrera Lake MD;  [facility];  Service:     INDUCED       By Dilation and Evacuation     No Known Allergies  No current outpatient prescriptions on file      Review of Systems  Constitutional:     Denies fever, chills ,fatigue ,weakness ,weight loss, weight gain     ENT: Denies earache ,loss of hearing ,nosebleed, nasal discharge,nasal congestion ,sore throat ,hoarseness  Pulmonary: Denies shortness of breath ,cough  ,dyspnea on exertion, orthopnea  ,PND   Cardiovascular:  Denies bradycardia , tachycardia  ,palpations, lower extremity edema leg, claudication  Breast:  Denies new or changing breast lumps ,nipple discharge ,nipple changes  Abdomen:  Denies abdominal pain , anorexia , indigestion, nausea, vomiting, constipation, diarrhea  Musculoskeletal: Denies myalgias, arthralgias, joint swelling, joint stiffness , limb pain, limb swelling  Gu: denies dysuria, polyuria  Skin: Denies skin rash, skin lesion, skin wound, itching, dry skin  Neuro: Denies headache, numbness, tingling, confusion, loss of consciousness, dizziness, vertigo  Psychiatric: Denies feelings of depression, suicidal ideation, anxiety, sleep disturbances    OBJECTIVE  /64   Pulse 86   Temp 97 7 °F (36 5 °C)   Resp 16   Ht 5' 7" (1 702 m)   Wt 103 kg (226 lb 3 2 oz)   BMI 35 43 kg/m²   Constitutional:   NAD, well appearing and well nourished      ENT:   Conjunctiva and lids: no injection, edema, or discharge     Pupils and iris: TREASURE bilaterally    External inspection of ears and nose: normal without deformities or discharge  Otoscopic exam: Canals patent without erythema  Nasal mucosa, septum and turbinates: Normal or edema or discharge         Oropharynx:  Moist mucosa, normal tongue and tonsils without lesions  No erythema        Pulmonary:Respiratory effort normal rate and rhythm, no increased work of breathing   Auscultation of lungs:  Clear bilaterally with no adventitious breath sounds       Cardiovascular: regular rate and rhythm, S1 and S2, no murmur, no edema and/or varicosities of LE      Abdomen: Soft and non-distended     Positive bowel sounds      No heptomegaly or splenomegaly      Gu: no suprapubic tenderness or CVA tenderness, no urethral discharge  Lymphatic:  No anterior or posterior cervical lymphadenopathy         Musculoskeletal:  Gait and station: Normal gait      Digits and nails normal without clubbing or cyanosis       Inspection/palpation of joints, bones, and muscles:  No joint tenderness, swelling, full active and passive range of motion       Skin: Normal skin turgor and no rashes      Neuro:     Normal reflexes     Psych:   alert and oriented to person, place and time     normal mood and affect       Assessment/Plan:Diagnoses and all orders for this visit:    Anxiety  -     escitalopram (LEXAPRO) 10 mg tablet; Take 1 tablet (10 mg total) by mouth daily    Other fatigue  -     Comprehensive metabolic panel; Future    Hypercholesterolemia  -     Lipid Panel with Direct LDL reflex; Future        I will see omar back in 1 month and titrate accordignly  If symptoms worsen, she is to call

## 2018-08-27 ENCOUNTER — TELEPHONE (OUTPATIENT)
Dept: FAMILY MEDICINE CLINIC | Facility: CLINIC | Age: 48
End: 2018-08-27

## 2018-08-27 NOTE — TELEPHONE ENCOUNTER
Patient was told to call in a month to let Dr Farooq know how she is feeling  Patient states she is feeling great on the medication and does not feel like the medication dose needs to be adjusted

## 2018-11-16 DIAGNOSIS — F41.9 ANXIETY: ICD-10-CM

## 2018-11-16 RX ORDER — ESCITALOPRAM OXALATE 10 MG/1
TABLET ORAL
Qty: 30 TABLET | Refills: 3 | Status: SHIPPED | OUTPATIENT
Start: 2018-11-16 | End: 2019-03-16 | Stop reason: SDUPTHER

## 2018-11-26 ENCOUNTER — HOSPITAL ENCOUNTER (OUTPATIENT)
Dept: RADIOLOGY | Age: 48
Discharge: HOME | End: 2018-11-26
Payer: COMMERCIAL

## 2018-11-26 VITALS — HEIGHT: 67 IN | BODY MASS INDEX: 35.47 KG/M2 | WEIGHT: 226 LBS

## 2018-11-26 DIAGNOSIS — Z12.31 ENCOUNTER FOR SCREENING MAMMOGRAM FOR MALIGNANT NEOPLASM OF BREAST: ICD-10-CM

## 2018-11-26 PROCEDURE — 77063 BREAST TOMOSYNTHESIS BI: CPT

## 2018-11-26 PROCEDURE — 77067 SCR MAMMO BI INCL CAD: CPT

## 2018-11-30 ENCOUNTER — OFFICE VISIT (OUTPATIENT)
Dept: FAMILY MEDICINE CLINIC | Facility: CLINIC | Age: 48
End: 2018-11-30
Payer: COMMERCIAL

## 2018-11-30 VITALS
BODY MASS INDEX: 35.47 KG/M2 | OXYGEN SATURATION: 96 % | SYSTOLIC BLOOD PRESSURE: 122 MMHG | WEIGHT: 226 LBS | HEIGHT: 67 IN | DIASTOLIC BLOOD PRESSURE: 80 MMHG | HEART RATE: 64 BPM | TEMPERATURE: 98.2 F

## 2018-11-30 DIAGNOSIS — J06.9 UPPER RESPIRATORY TRACT INFECTION, UNSPECIFIED TYPE: Primary | ICD-10-CM

## 2018-11-30 PROCEDURE — 1036F TOBACCO NON-USER: CPT | Performed by: FAMILY MEDICINE

## 2018-11-30 PROCEDURE — 3008F BODY MASS INDEX DOCD: CPT | Performed by: FAMILY MEDICINE

## 2018-11-30 PROCEDURE — 99213 OFFICE O/P EST LOW 20 MIN: CPT | Performed by: FAMILY MEDICINE

## 2018-11-30 RX ORDER — LEVOFLOXACIN 500 MG/1
500 TABLET, FILM COATED ORAL EVERY 24 HOURS
Qty: 10 TABLET | Refills: 0 | Status: SHIPPED | OUTPATIENT
Start: 2018-11-30 | End: 2018-12-10

## 2018-11-30 RX ORDER — DEXTROMETHORPHAN HYDROBROMIDE AND PROMETHAZINE HYDROCHLORIDE 15; 6.25 MG/5ML; MG/5ML
5 SYRUP ORAL 4 TIMES DAILY PRN
Qty: 180 ML | Refills: 0 | Status: SHIPPED | OUTPATIENT
Start: 2018-11-30 | End: 2019-10-18

## 2018-11-30 NOTE — PROGRESS NOTES
Patient ID: Vinny Fowler is a 53 y o  female  HPI: 53 y  o female presenting with  4 day history of sore throat, nasal congestion, ear pain,pnd and cough  Pt denies any fever, chills, or body aches  SUBJECTIVE    Family History   Problem Relation Age of Onset   Cabrera Daubs Cancer Mother     Hypothyroidism Mother     Lung cancer Mother 72    Heart disease Father     Venous thrombosis Father         of Deep Vessels of Lower Extremity    Aortic aneurysm Father     Breast cancer Paternal Grandmother 61    Hypertension Paternal Grandfather     Breast cancer Maternal Aunt      Social History     Social History    Marital status: /Civil Union     Spouse name: N/A    Number of children: N/A    Years of education: N/A     Occupational History    Not on file  Social History Main Topics    Smoking status: Former Smoker     Packs/day: 1 00     Years: 0 00    Smokeless tobacco: Former User     Quit date:     Alcohol use Yes      Comment: socially    Drug use: No    Sexual activity: Yes     Partners: Male     Other Topics Concern    Not on file     Social History Narrative    Daily coffee consumption , 2 cups/day    Exercise regularly         Past Medical History:   Diagnosis Date    Asthma     Dysfunctional uterine bleeding      Past Surgical History:   Procedure Laterality Date    DILATION AND CURETTAGE OF UTERUS      DILATION AND CURETTAGE OF UTERUS N/A 2017    Procedure: DILATATION AND CURETTAGE (D&C);   Surgeon: Hugo Becerril MD;  [facility];  Service:     ENDOMETRIAL ABLATION N/A 2017    Procedure: Anu Hock;  Surgeon: Hugo Becerril MD;  [facility];  Service:     INDUCED       By Dilation and Evacuation     No Known Allergies    Current Outpatient Prescriptions:     escitalopram (LEXAPRO) 10 mg tablet, TAKE 1 TABLET BY MOUTH EVERY DAY, Disp: 30 tablet, Rfl: 3    levofloxacin (LEVAQUIN) 500 mg tablet, Take 1 tablet (500 mg total) by mouth every 24 hours for 10 days, Disp: 10 tablet, Rfl: 0    promethazine-dextromethorphan (PHENERGAN-DM) 6 25-15 mg/5 mL oral syrup, Take 5 mL by mouth 4 (four) times a day as needed for cough, Disp: 180 mL, Rfl: 0    Review of Systems  Constitutional:     Denies fever, chills ,fatigue ,weakness ,weight loss, weight gain     ENT: Denies loss of hearing ,nosebleed, nasal discharge ,hoarseness; but admits to nasal congestion , sore throat and ear pain  Pulmonary: Denies shortness of breath ,dyspnea on exertion, orthopnea ; but admits to cough and pnd  Cardiovascular:  Denies bradycardia , tachycardia  ,palpations, lower extremity edema leg, claudication  Breast:  Denies new or changing breast lumps ,nipple discharge ,nipple changes  Abdomen:  Denies abdominal pain , anorexia , indigestion, nausea, vomiting, constipation, diarrhea  Musculoskeletal: Denies myalgias, arthralgias, joint swelling, joint stiffness , limb pain, limb swelling  Lymph: + swollen glands  Gu: Denies polyuria or dysuria  Skin: Denies skin rash, skin lesion, skin wound, itching, dry skin  Neuro: Denies headache, numbness, tingling, confusion, loss of consciousness, dizziness, vertigo  Psychiatric: Denies feelings of depression, suicidal ideation, anxiety, sleep disturbances    OBJECTIVE  /80   Pulse 64   Temp 98 2 °F (36 8 °C)   Ht 5' 7" (1 702 m)   Wt 103 kg (226 lb)   LMP 11/04/2018 (Exact Date)   SpO2 96%   BMI 35 40 kg/m²   Constitutional:   NAD, well appearing and well nourished     ENT:   Conjunctiva and lids: no injection, edema, or discharge    Pupils and iris: TREASURE bilaterally  External inspection of ears and nose: normal without deformities or discharge  Otoscopic exam: Canals patent without erythema, tm dull and erythematous, effusions bilaterally   Nasal mucosa, septum and turbinates: + turbinate injection, nasal discharge         Oropharynx:  Moist mucosa, normal tongue and tonsils without lesions  + erythema and injection of posterior pharynx with pnd    Pulmonary:Respiratory effort normal rate and rhythm, no increased work of breathing  Auscultation of lungs:  Clear bilaterally with no adventitious breath sounds     Cardiovascular: regular rate and rhythm, S1 and S2, no murmur, no edema and/or varicosities of LE     Abdomen: Soft and nontender with + bowel sounds  No heptomegaly or splenomegaly     Gu: no suprapubic tenderness or CVA tenderness  Lymphatic: + anterior  cervical lymphadenopathy      Musculoskeletal:  Gait and station: Normal gait      Digits and nails normal without clubbing or cyanosis      Inspection/palpation of joints, bones, and muscles:  No joint tenderness, swelling, full active and passive range of motion       Skin: Normal skin turgor and no rashes      Neuro:    Normal reflexes  Pych:   alert and oriented to person, place and time     normal mood and affect      Assessment/Plan:Diagnoses and all orders for this visit:    Upper respiratory tract infection, unspecified type  -     promethazine-dextromethorphan (PHENERGAN-DM) 6 25-15 mg/5 mL oral syrup; Take 5 mL by mouth 4 (four) times a day as needed for cough  -     levofloxacin (LEVAQUIN) 500 mg tablet; Take 1 tablet (500 mg total) by mouth every 24 hours for 10 days        Reviewed with patient plan to treat with plan as above      Patient instructed to call in 72 hours if not feeling better or if symptoms worsen

## 2018-12-07 ENCOUNTER — APPOINTMENT (OUTPATIENT)
Dept: LAB | Facility: CLINIC | Age: 48
End: 2018-12-07
Payer: COMMERCIAL

## 2018-12-07 ENCOUNTER — TRANSCRIBE ORDERS (OUTPATIENT)
Dept: LAB | Facility: CLINIC | Age: 48
End: 2018-12-07

## 2018-12-07 DIAGNOSIS — E78.00 HYPERCHOLESTEROLEMIA: ICD-10-CM

## 2018-12-07 DIAGNOSIS — R53.83 OTHER FATIGUE: ICD-10-CM

## 2018-12-07 LAB
ALBUMIN SERPL BCP-MCNC: 3.5 G/DL (ref 3.5–5)
ALP SERPL-CCNC: 92 U/L (ref 46–116)
ALT SERPL W P-5'-P-CCNC: 33 U/L (ref 12–78)
ANION GAP SERPL CALCULATED.3IONS-SCNC: 9 MMOL/L (ref 4–13)
AST SERPL W P-5'-P-CCNC: 17 U/L (ref 5–45)
BILIRUB SERPL-MCNC: 0.5 MG/DL (ref 0.2–1)
BUN SERPL-MCNC: 13 MG/DL (ref 5–25)
CALCIUM SERPL-MCNC: 9.2 MG/DL (ref 8.3–10.1)
CHLORIDE SERPL-SCNC: 104 MMOL/L (ref 100–108)
CHOLEST SERPL-MCNC: 185 MG/DL (ref 50–200)
CO2 SERPL-SCNC: 26 MMOL/L (ref 21–32)
CREAT SERPL-MCNC: 0.97 MG/DL (ref 0.6–1.3)
GFR SERPL CREATININE-BSD FRML MDRD: 70 ML/MIN/1.73SQ M
GLUCOSE P FAST SERPL-MCNC: 89 MG/DL (ref 65–99)
HDLC SERPL-MCNC: 46 MG/DL (ref 40–60)
LDLC SERPL CALC-MCNC: 120 MG/DL (ref 0–100)
POTASSIUM SERPL-SCNC: 4.2 MMOL/L (ref 3.5–5.3)
PROT SERPL-MCNC: 7 G/DL (ref 6.4–8.2)
SODIUM SERPL-SCNC: 139 MMOL/L (ref 136–145)
TRIGL SERPL-MCNC: 94 MG/DL

## 2018-12-07 PROCEDURE — 36415 COLL VENOUS BLD VENIPUNCTURE: CPT

## 2018-12-07 PROCEDURE — 80053 COMPREHEN METABOLIC PANEL: CPT

## 2018-12-07 PROCEDURE — 80061 LIPID PANEL: CPT

## 2019-03-16 DIAGNOSIS — F41.9 ANXIETY: ICD-10-CM

## 2019-03-18 RX ORDER — ESCITALOPRAM OXALATE 10 MG/1
TABLET ORAL
Qty: 30 TABLET | Refills: 3 | Status: SHIPPED | OUTPATIENT
Start: 2019-03-18 | End: 2019-05-25 | Stop reason: SDUPTHER

## 2019-05-25 DIAGNOSIS — F41.9 ANXIETY: ICD-10-CM

## 2019-05-28 RX ORDER — ESCITALOPRAM OXALATE 10 MG/1
TABLET ORAL
Qty: 30 TABLET | Refills: 1 | Status: SHIPPED | OUTPATIENT
Start: 2019-05-28 | End: 2019-06-21 | Stop reason: SDUPTHER

## 2019-06-21 DIAGNOSIS — F41.9 ANXIETY: ICD-10-CM

## 2019-06-21 RX ORDER — ESCITALOPRAM OXALATE 10 MG/1
TABLET ORAL
Qty: 30 TABLET | Refills: 1 | Status: SHIPPED | OUTPATIENT
Start: 2019-06-21 | End: 2019-07-22 | Stop reason: SDUPTHER

## 2019-07-22 DIAGNOSIS — F41.9 ANXIETY: ICD-10-CM

## 2019-07-22 RX ORDER — ESCITALOPRAM OXALATE 10 MG/1
TABLET ORAL
Qty: 30 TABLET | Refills: 3 | Status: SHIPPED | OUTPATIENT
Start: 2019-07-22 | End: 2019-08-07 | Stop reason: SDUPTHER

## 2019-08-06 ENCOUNTER — TELEPHONE (OUTPATIENT)
Dept: FAMILY MEDICINE CLINIC | Facility: CLINIC | Age: 49
End: 2019-08-06

## 2019-08-06 NOTE — TELEPHONE ENCOUNTER
Pt called looking for a refill on her Escitalopram 10 mg1 tab qd  # 80 ,she needs a written script for her mail order and will P/U tomorrow

## 2019-08-07 DIAGNOSIS — F41.9 ANXIETY: ICD-10-CM

## 2019-08-07 RX ORDER — ESCITALOPRAM OXALATE 10 MG/1
10 TABLET ORAL DAILY
Qty: 90 TABLET | Refills: 3 | Status: SHIPPED | OUTPATIENT
Start: 2019-08-07 | End: 2020-06-01 | Stop reason: SDUPTHER

## 2019-10-18 ENCOUNTER — OFFICE VISIT (OUTPATIENT)
Dept: FAMILY MEDICINE CLINIC | Facility: CLINIC | Age: 49
End: 2019-10-18
Payer: COMMERCIAL

## 2019-10-18 VITALS
WEIGHT: 220 LBS | RESPIRATION RATE: 16 BRPM | DIASTOLIC BLOOD PRESSURE: 80 MMHG | SYSTOLIC BLOOD PRESSURE: 122 MMHG | HEIGHT: 67 IN | BODY MASS INDEX: 34.53 KG/M2 | TEMPERATURE: 97.9 F | HEART RATE: 60 BPM

## 2019-10-18 DIAGNOSIS — Z11.1 SCREENING FOR TUBERCULOSIS: ICD-10-CM

## 2019-10-18 DIAGNOSIS — Z00.00 ANNUAL PHYSICAL EXAM: Primary | ICD-10-CM

## 2019-10-18 PROBLEM — E66.3 OVERWEIGHT: Status: ACTIVE | Noted: 2019-10-18

## 2019-10-18 PROBLEM — N93.8 OTHER DISORDERS OF MENSTRUATION AND OTHER ABNORMAL BLEEDING FROM FEMALE GENITAL TRACT: Status: ACTIVE | Noted: 2019-10-18

## 2019-10-18 PROBLEM — N92.5 OTHER DISORDERS OF MENSTRUATION AND OTHER ABNORMAL BLEEDING FROM FEMALE GENITAL TRACT: Status: ACTIVE | Noted: 2019-10-18

## 2019-10-18 PROCEDURE — 86580 TB INTRADERMAL TEST: CPT | Performed by: NURSE PRACTITIONER

## 2019-10-18 PROCEDURE — 99396 PREV VISIT EST AGE 40-64: CPT | Performed by: NURSE PRACTITIONER

## 2019-10-18 RX ORDER — ALBUTEROL SULFATE 90 UG/1
2 AEROSOL, METERED RESPIRATORY (INHALATION) EVERY 4 HOURS
COMMUNITY
Start: 2017-07-25 | End: 2020-09-08 | Stop reason: ALTCHOICE

## 2019-10-18 NOTE — PATIENT INSTRUCTIONS

## 2019-10-18 NOTE — PROGRESS NOTES
NAME: Jacey Campbell  AGE: 51 y o  SEX: female  : 1970     DATE: 10/18/2019     Assessment and Plan:     Problem List Items Addressed This Visit     None      Visit Diagnoses     Annual physical exam    -  Primary    Screening for tuberculosis        Relevant Orders    TB Skin Test (Completed)          Immunizations and preventive care screenings were discussed with patient today  Appropriate education was printed on patient's after visit summary  Counseling:  Alcohol/drug use: discussed moderation in alcohol intake, the recommendations for healthy alcohol use, and avoidance of illicit drug use  Dental Health: discussed importance of regular tooth brushing, flossing, and dental visits  Injury prevention: discussed safety/seat belts, safety helmets, smoke detectors, carbon dioxide detectors, and smoking near bedding or upholstery  Sexual health: discussed sexually transmitted diseases, partner selection, use of condoms, avoidance of unintended pregnancy, and contraceptive alternatives  · Exercise: the importance of regular exercise/physical activity was discussed  Recommend exercise 3-5 times per week for at least 30 minutes  BMI Counseling: Body mass index is 34 46 kg/m²  The BMI is above normal  Nutrition recommendations include decreasing portion sizes, encouraging healthy choices of fruits and vegetables, consuming healthier snacks, moderation in carbohydrate intake, increasing intake of lean protein and reducing intake of saturated and trans fat  Exercise recommendations include moderate physical activity 150 minutes/week  No pharmacotherapy was ordered  Return in about 1 year (around 10/18/2020)  Chief Complaint:     Chief Complaint   Patient presents with    Annual Exam     work physical with forms and TB test      History of Present Illness:     Adult Annual Physical   Patient here for a comprehensive physical exam  The patient reports problems - dietary loose bowel movements  Patient believes that the bowel habit issues related to her poor diet because when she eats healthy the issues resolve  Diet and Physical Activity  · Diet/Nutrition: poor diet, limited junk food and consuming 3-5 servings of fruits/vegetables daily  · Exercise: walking, 1-2 times a week on average and 30-60 minutes on average  Depression Screening  PHQ-9 Depression Screening    PHQ-9:    Frequency of the following problems over the past two weeks:       Little interest or pleasure in doing things:  0 - not at all  Feeling down, depressed, or hopeless:  0 - not at all  PHQ-2 Score:  0       General Health  · Sleep: sleeps well and gets 4-6 hours of sleep on average  · Hearing: normal - bilateral   · Vision: goes for regular eye exams, most recent eye exam <1 year ago and wears glasses and contacts  · Dental: regular dental visits, brushes teeth twice daily and flosses teeth occasionally  /GYN Health  · Patient is: perimenopausal  · Last menstrual period: 10/09/19     Review of Systems:     Review of Systems   Constitutional: Negative  HENT: Negative  Eyes: Negative  Respiratory: Negative  Cardiovascular: Negative  Gastrointestinal: Positive for diarrhea ( loose stools ? related to poor diet)  Endocrine: Negative  Genitourinary: Negative  Musculoskeletal: Negative  Skin: Negative  Allergic/Immunologic: Negative  Neurological: Negative  Hematological: Negative  Psychiatric/Behavioral: Negative  Past Medical History:     Past Medical History:   Diagnosis Date    Asthma     Dysfunctional uterine bleeding       Past Surgical History:     Past Surgical History:   Procedure Laterality Date    DILATION AND CURETTAGE OF UTERUS      DILATION AND CURETTAGE OF UTERUS N/A 2/9/2017    Procedure: DILATATION AND CURETTAGE (D&C);   Surgeon: Crissy Denton MD;  [facility];  Service:     ENDOMETRIAL ABLATION N/A 2/9/2017    Procedure: ABLATION ENDOMETRIAL Ghislaine Gerardo;  Surgeon: Penelope Gaming MD;  [facility];  Service:     INDUCED       By Dilation and Evacuation      Social History:     Social History     Socioeconomic History    Marital status: /Civil Union     Spouse name: None    Number of children: None    Years of education: None    Highest education level: None   Occupational History    None   Social Needs    Financial resource strain: None    Food insecurity:     Worry: None     Inability: None    Transportation needs:     Medical: None     Non-medical: None   Tobacco Use    Smoking status: Former Smoker     Packs/day: 1 00     Years: 0 00     Pack years: 0 00    Smokeless tobacco: Former User     Quit date:    Substance and Sexual Activity    Alcohol use: Yes     Comment: socially    Drug use: No    Sexual activity: Yes     Partners: Male   Lifestyle    Physical activity:     Days per week: None     Minutes per session: None    Stress: None   Relationships    Social connections:     Talks on phone: None     Gets together: None     Attends Oriental orthodox service: None     Active member of club or organization: None     Attends meetings of clubs or organizations: None     Relationship status: None    Intimate partner violence:     Fear of current or ex partner: None     Emotionally abused: None     Physically abused: None     Forced sexual activity: None   Other Topics Concern    None   Social History Narrative    Daily coffee consumption , 2 cups/day    Exercise regularly      Family History:     Family History   Problem Relation Age of Onset    Cancer Mother     Hypothyroidism Mother     Lung cancer Mother 72    Heart disease Father     Venous thrombosis Father         of Deep Vessels of Lower Extremity    Aortic aneurysm Father     Breast cancer Paternal Grandmother 61    Hypertension Paternal Grandfather     Breast cancer Maternal Aunt       Current Medications:     Current Outpatient Medications Medication Sig Dispense Refill    albuterol (VENTOLIN HFA) 90 mcg/act inhaler Inhale 2 puffs every 4 (four) hours      escitalopram (LEXAPRO) 10 mg tablet Take 1 tablet (10 mg total) by mouth daily 90 tablet 3     No current facility-administered medications for this visit  Allergies:     No Known Allergies   Physical Exam:     /80   Pulse 60   Temp 97 9 °F (36 6 °C) (Tympanic)   Resp 16   Ht 5' 7" (1 702 m)   Wt 99 8 kg (220 lb)   BMI 34 46 kg/m² (Reviewed)    Physical Exam   Constitutional: She is oriented to person, place, and time  Vital signs are normal  She appears well-developed and well-nourished  HENT:   Head: Normocephalic and atraumatic  Right Ear: Tympanic membrane, external ear and ear canal normal    Left Ear: Tympanic membrane, external ear and ear canal normal    Nose: Nose normal    Mouth/Throat: Uvula is midline, oropharynx is clear and moist and mucous membranes are normal    Eyes: Pupils are equal, round, and reactive to light  Conjunctivae, EOM and lids are normal    Neck: Trachea normal and full passive range of motion without pain  Neck supple  Cardiovascular: Normal rate, regular rhythm, normal heart sounds and intact distal pulses  No peripheral edema noted   Pulmonary/Chest: Effort normal and breath sounds normal    Abdominal: Soft  Bowel sounds are normal  She exhibits no distension  There is no tenderness  Musculoskeletal: Normal range of motion  Lymphadenopathy:     She has no cervical adenopathy  Neurological: She is alert and oriented to person, place, and time  She has normal strength and normal reflexes  No cranial nerve deficit or sensory deficit  Skin: Skin is warm and dry  Capillary refill takes less than 2 seconds  No cyanosis  Nails show no clubbing  Psychiatric: She has a normal mood and affect  Her behavior is normal      BMI Counseling: Body mass index is 34 46 kg/m²   The BMI is above normal  Nutrition recommendations include reducing portion sizes, decreasing overall calorie intake, consuming healthier snacks, moderation in carbohydrate intake, increasing intake of lean protein and reducing intake of saturated fat and trans fat  Exercise recommendations include moderate aerobic physical activity for 150 minutes/week

## 2019-10-21 ENCOUNTER — CLINICAL SUPPORT (OUTPATIENT)
Dept: FAMILY MEDICINE CLINIC | Facility: CLINIC | Age: 49
End: 2019-10-21

## 2019-10-21 DIAGNOSIS — Z11.1 ENCOUNTER FOR PPD SKIN TEST READING: Primary | ICD-10-CM

## 2019-10-21 LAB
INDURATION: NORMAL MM
TB SKIN TEST: NEGATIVE

## 2019-10-31 ENCOUNTER — TELEPHONE (OUTPATIENT)
Dept: FAMILY MEDICINE CLINIC | Facility: CLINIC | Age: 49
End: 2019-10-31

## 2019-10-31 NOTE — TELEPHONE ENCOUNTER
Was seen a few weeks ago with Natalie  for a work PE    She said she left her paper work with you and never got it back      Do you still have this?    She said in was in a folder

## 2019-11-01 NOTE — TELEPHONE ENCOUNTER
Paperwork was to be given to patient on the day she had her TB test read but unable to find original form  Scan copy of form printed out and will be sent to patient's home

## 2019-12-02 ENCOUNTER — HOSPITAL ENCOUNTER (OUTPATIENT)
Dept: RADIOLOGY | Age: 49
Discharge: HOME | End: 2019-12-02
Payer: COMMERCIAL

## 2019-12-02 VITALS — WEIGHT: 195 LBS | BODY MASS INDEX: 29.55 KG/M2 | HEIGHT: 68 IN

## 2019-12-02 DIAGNOSIS — Z12.31 ENCOUNTER FOR SCREENING MAMMOGRAM FOR MALIGNANT NEOPLASM OF BREAST: ICD-10-CM

## 2019-12-02 PROCEDURE — 77067 SCR MAMMO BI INCL CAD: CPT

## 2019-12-02 PROCEDURE — 77063 BREAST TOMOSYNTHESIS BI: CPT

## 2019-12-12 ENCOUNTER — TRANSCRIBE ORDERS (OUTPATIENT)
Dept: ADMINISTRATIVE | Facility: HOSPITAL | Age: 49
End: 2019-12-12

## 2019-12-12 DIAGNOSIS — Z12.31 ENCOUNTER FOR SCREENING MAMMOGRAM FOR MALIGNANT NEOPLASM OF BREAST: Primary | ICD-10-CM

## 2020-02-07 ENCOUNTER — ANNUAL EXAM (OUTPATIENT)
Dept: OBGYN CLINIC | Facility: CLINIC | Age: 50
End: 2020-02-07
Payer: COMMERCIAL

## 2020-02-07 VITALS — DIASTOLIC BLOOD PRESSURE: 70 MMHG | SYSTOLIC BLOOD PRESSURE: 124 MMHG

## 2020-02-07 DIAGNOSIS — N92.0 MENORRHAGIA WITH REGULAR CYCLE: ICD-10-CM

## 2020-02-07 DIAGNOSIS — Z01.419 ENCOUNTER FOR GYNECOLOGICAL EXAMINATION (GENERAL) (ROUTINE) WITHOUT ABNORMAL FINDINGS: Primary | ICD-10-CM

## 2020-02-07 DIAGNOSIS — Z12.4 ENCOUNTER FOR PAPANICOLAOU SMEAR FOR CERVICAL CANCER SCREENING: ICD-10-CM

## 2020-02-07 DIAGNOSIS — Z12.11 COLON CANCER SCREENING: ICD-10-CM

## 2020-02-07 DIAGNOSIS — R19.5 LOOSE STOOLS: ICD-10-CM

## 2020-02-07 DIAGNOSIS — Z11.51 SCREENING FOR HPV (HUMAN PAPILLOMAVIRUS): ICD-10-CM

## 2020-02-07 DIAGNOSIS — Z12.31 ENCOUNTER FOR SCREENING MAMMOGRAM FOR MALIGNANT NEOPLASM OF BREAST: ICD-10-CM

## 2020-02-07 PROBLEM — N93.8 OTHER DISORDERS OF MENSTRUATION AND OTHER ABNORMAL BLEEDING FROM FEMALE GENITAL TRACT: Status: RESOLVED | Noted: 2019-10-18 | Resolved: 2020-02-07

## 2020-02-07 PROBLEM — N92.5 OTHER DISORDERS OF MENSTRUATION AND OTHER ABNORMAL BLEEDING FROM FEMALE GENITAL TRACT: Status: RESOLVED | Noted: 2019-10-18 | Resolved: 2020-02-07

## 2020-02-07 PROCEDURE — 87624 HPV HI-RISK TYP POOLED RSLT: CPT | Performed by: NURSE PRACTITIONER

## 2020-02-07 PROCEDURE — S0612 ANNUAL GYNECOLOGICAL EXAMINA: HCPCS | Performed by: NURSE PRACTITIONER

## 2020-02-07 PROCEDURE — G0145 SCR C/V CYTO,THINLAYER,RESCR: HCPCS | Performed by: NURSE PRACTITIONER

## 2020-02-07 RX ORDER — TRANEXAMIC ACID 650 1/1
TABLET ORAL
Qty: 30 TABLET | Refills: 1 | Status: SHIPPED | OUTPATIENT
Start: 2020-02-07 | End: 2020-09-08 | Stop reason: ALTCHOICE

## 2020-02-07 NOTE — ASSESSMENT & PLAN NOTE
Benign findings on routine gyn exam  Recommended monthly SBE, annual CBE and annual screening mammo  ASCCP guidelines reviewed and pap with cotesting was collected today  Baseline colonoscopy was advised for colon ca screening as well as for c/o loose stool, as discussed separately  The patient denies STI risk factors and declines testing at this time  Reviewed diet/activity recommendations:  Encouraged daily Ca++ and vitamin D intake as well as daily weight bearing exercise for promotion of bone health    Discussed perimenopausal considerations and symptoms to report  RTO in one year for routine annual gyn exam or sooner PRN

## 2020-02-07 NOTE — ASSESSMENT & PLAN NOTE
Jimy Tracy reports loose stools for about 1 yr  She feels this is related to dietary factors  Offered GI referral for consultation and recommended baseline colonoscopy   She agrees with plan

## 2020-02-07 NOTE — ASSESSMENT & PLAN NOTE
Heavy menses have continued despite endometrial ablation in 2017  DUB work up was with benign findings  Reviewed options for medical management incl hormonal and non-hormonal medications; discussed risks/benefits, SEs/AEs of each  She elects trial of Lysteda  She denies personal/FH VTE risk factors  Reviewed directions for use, sx to report and reasons to call

## 2020-02-07 NOTE — PROGRESS NOTES
Assessment/Plan:    Encounter for gynecological examination (general) (routine) without abnormal findings  Benign findings on routine gyn exam  Recommended monthly SBE, annual CBE and annual screening mammo  ASCCP guidelines reviewed and pap with cotesting was collected today  Baseline colonoscopy was advised for colon ca screening as well as for c/o loose stool, as discussed separately  The patient denies STI risk factors and declines testing at this time  Reviewed diet/activity recommendations:  Encouraged daily Ca++ and vitamin D intake as well as daily weight bearing exercise for promotion of bone health    Discussed perimenopausal considerations and symptoms to report  RTO in one year for routine annual gyn exam or sooner PRN  Loose stools  Honey Hollis reports loose stools for about 1 yr  She feels this is related to dietary factors  Offered GI referral for consultation and recommended baseline colonoscopy  She agrees with plan    Menorrhagia with regular cycle  Heavy menses have continued despite endometrial ablation in 2017  DUB work up was with benign findings  Reviewed options for medical management incl hormonal and non-hormonal medications; discussed risks/benefits, SEs/AEs of each  She elects trial of Lysteda  She denies personal/FH VTE risk factors  Reviewed directions for use, sx to report and reasons to call  Diagnoses and all orders for this visit:    Encounter for gynecological examination (general) (routine) without abnormal findings    Encounter for screening mammogram for malignant neoplasm of breast  -     Mammo screening bilateral w 3d & cad; Future    Encounter for Papanicolaou smear for cervical cancer screening  -     Liquid-based pap, screening    Screening for HPV (human papillomavirus)  -     Liquid-based pap, screening    Loose stools  -     Ambulatory referral to Gastroenterology;  Future    Colon cancer screening  -     Ambulatory referral to Gastroenterology; Future    Menorrhagia with regular cycle  -     Tranexamic Acid 650 MG TABS; 2 tablets by mouth 3 times daily for up to 5 consecutive days as needed for heavy menstrual bleeding  Subjective:      Patient ID: Maggi Batres is a 51 y o  female  This patient presents for routine annual gyn exam   Medically stable  S/p endometrial ablation 2017 - still has monthly menses which is heavy x2d  Not interested in hormonal meds as she has experienced SEs on these in the past    She does experience occ VM sx which are tolerable  Also c/o loose stools occurring in the last year  She feels these are triggered by certain foods  Has never had baseline colonoscopy  She denies acute gyn complaints otherwise  She denies pelvic pain, breast concerns, abn discharge, bladder dysfunction, depression/anx   and monog  Denies STI concerns  Condoms    Boys are 18 ([facility]) and 21 ([facility])  Just returned from [facility]          The following portions of the patient's history were reviewed and updated as appropriate: allergies, current medications, past family history, past medical history, past social history, past surgical history and problem list     Review of Systems   Constitutional: Negative  Respiratory: Negative  Cardiovascular: Negative  Gastrointestinal: Negative  Genitourinary: Positive for menstrual problem  Negative for dysuria, frequency, pelvic pain, vaginal bleeding and vaginal discharge  Musculoskeletal: Negative  Skin: Negative  Neurological: Negative  Psychiatric/Behavioral: Negative  Objective:      /70   LMP 01/26/2020          Physical Exam   Constitutional: She is oriented to person, place, and time  She appears well-developed and well-nourished  HENT:   Head: Normocephalic and atraumatic  Eyes: Pupils are equal, round, and reactive to light  EOM are normal    Neck: Normal range of motion  Neck supple  No thyromegaly present     Cardiovascular: Normal rate, regular rhythm and normal heart sounds  Pulmonary/Chest: Effort normal and breath sounds normal  No respiratory distress  She has no wheezes  She has no rales  She exhibits no mass, no tenderness and no deformity  Right breast exhibits no inverted nipple, no mass, no nipple discharge, no skin change and no tenderness  Left breast exhibits no inverted nipple, no mass, no nipple discharge, no skin change and no tenderness  No breast tenderness or discharge  Breasts are symmetrical    Abdominal: Soft  She exhibits no distension and no mass  There is no splenomegaly or hepatomegaly  There is no tenderness  There is no rebound and no guarding  Genitourinary: Rectum normal, vagina normal and uterus normal  No breast tenderness or discharge  No labial fusion  There is no rash, tenderness, lesion or injury on the right labia  There is no rash, tenderness, lesion or injury on the left labia  Cervix exhibits no motion tenderness, no discharge and no friability  Right adnexum displays no mass, no tenderness and no fullness  Left adnexum displays no mass, no tenderness and no fullness  No erythema, tenderness or bleeding in the vagina  No foreign body in the vagina  No vaginal discharge found  Musculoskeletal: Normal range of motion  Lymphadenopathy:     She has no cervical adenopathy  She has no axillary adenopathy  Neurological: She is alert and oriented to person, place, and time  No cranial nerve deficit  Skin: Skin is warm and dry  No rash noted  No cyanosis  Nails show no clubbing  Psychiatric: She has a normal mood and affect   Her speech is normal and behavior is normal  Judgment and thought content normal  Cognition and memory are normal

## 2020-02-10 LAB
HPV HR 12 DNA CVX QL NAA+PROBE: NEGATIVE
HPV16 DNA CVX QL NAA+PROBE: NEGATIVE
HPV18 DNA CVX QL NAA+PROBE: NEGATIVE

## 2020-02-11 LAB
LAB AP GYN PRIMARY INTERPRETATION: NORMAL
Lab: NORMAL
PATH INTERP SPEC-IMP: NORMAL

## 2020-04-21 ENCOUNTER — TELEMEDICINE (OUTPATIENT)
Dept: GASTROENTEROLOGY | Facility: CLINIC | Age: 50
End: 2020-04-21
Payer: COMMERCIAL

## 2020-04-21 DIAGNOSIS — Z12.11 COLON CANCER SCREENING: ICD-10-CM

## 2020-04-21 DIAGNOSIS — Z11.59 NEED FOR HEPATITIS C SCREENING TEST: ICD-10-CM

## 2020-04-21 DIAGNOSIS — R19.5 LOOSE STOOLS: Primary | ICD-10-CM

## 2020-04-21 PROCEDURE — 99202 OFFICE O/P NEW SF 15 MIN: CPT | Performed by: INTERNAL MEDICINE

## 2020-06-01 ENCOUNTER — TELEPHONE (OUTPATIENT)
Dept: FAMILY MEDICINE CLINIC | Facility: CLINIC | Age: 50
End: 2020-06-01

## 2020-06-01 DIAGNOSIS — F41.9 ANXIETY: ICD-10-CM

## 2020-06-01 RX ORDER — ESCITALOPRAM OXALATE 20 MG/1
20 TABLET ORAL DAILY
Qty: 30 TABLET | Refills: 3 | Status: SHIPPED | OUTPATIENT
Start: 2020-06-01 | End: 2020-06-01 | Stop reason: SDUPTHER

## 2020-06-01 RX ORDER — ESCITALOPRAM OXALATE 20 MG/1
20 TABLET ORAL DAILY
Qty: 30 TABLET | Refills: 3 | Status: SHIPPED | OUTPATIENT
Start: 2020-06-01 | End: 2020-10-06 | Stop reason: DRUGHIGH

## 2020-06-10 ENCOUNTER — APPOINTMENT (OUTPATIENT)
Dept: LAB | Facility: CLINIC | Age: 50
End: 2020-06-10
Payer: COMMERCIAL

## 2020-06-10 DIAGNOSIS — R19.5 LOOSE STOOLS: ICD-10-CM

## 2020-06-10 DIAGNOSIS — Z11.59 NEED FOR HEPATITIS C SCREENING TEST: ICD-10-CM

## 2020-06-10 LAB
ALBUMIN SERPL BCP-MCNC: 4.1 G/DL (ref 3.5–5)
ALP SERPL-CCNC: 85 U/L (ref 46–116)
ALT SERPL W P-5'-P-CCNC: 23 U/L (ref 12–78)
ANION GAP SERPL CALCULATED.3IONS-SCNC: 8 MMOL/L (ref 4–13)
AST SERPL W P-5'-P-CCNC: 15 U/L (ref 5–45)
BASOPHILS # BLD AUTO: 0.04 THOUSANDS/ΜL (ref 0–0.1)
BASOPHILS NFR BLD AUTO: 1 % (ref 0–1)
BILIRUB SERPL-MCNC: 0.66 MG/DL (ref 0.2–1)
BUN SERPL-MCNC: 16 MG/DL (ref 5–25)
CALCIUM SERPL-MCNC: 9.2 MG/DL (ref 8.3–10.1)
CHLORIDE SERPL-SCNC: 103 MMOL/L (ref 100–108)
CHOLEST SERPL-MCNC: 205 MG/DL (ref 50–200)
CO2 SERPL-SCNC: 26 MMOL/L (ref 21–32)
CREAT SERPL-MCNC: 0.95 MG/DL (ref 0.6–1.3)
CRP SERPL QL: 4.6 MG/L
EOSINOPHIL # BLD AUTO: 0.32 THOUSAND/ΜL (ref 0–0.61)
EOSINOPHIL NFR BLD AUTO: 5 % (ref 0–6)
ERYTHROCYTE [DISTWIDTH] IN BLOOD BY AUTOMATED COUNT: 12.2 % (ref 11.6–15.1)
EST. AVERAGE GLUCOSE BLD GHB EST-MCNC: 103 MG/DL
GFR SERPL CREATININE-BSD FRML MDRD: 71 ML/MIN/1.73SQ M
GLUCOSE P FAST SERPL-MCNC: 97 MG/DL (ref 65–99)
HBA1C MFR BLD: 5.2 %
HCT VFR BLD AUTO: 42 % (ref 34.8–46.1)
HCV AB SER QL: NORMAL
HDLC SERPL-MCNC: 44 MG/DL
HGB BLD-MCNC: 14.2 G/DL (ref 11.5–15.4)
IMM GRANULOCYTES # BLD AUTO: 0.03 THOUSAND/UL (ref 0–0.2)
IMM GRANULOCYTES NFR BLD AUTO: 1 % (ref 0–2)
LDLC SERPL CALC-MCNC: 141 MG/DL (ref 0–100)
LYMPHOCYTES # BLD AUTO: 1.46 THOUSANDS/ΜL (ref 0.6–4.47)
LYMPHOCYTES NFR BLD AUTO: 24 % (ref 14–44)
MCH RBC QN AUTO: 30.4 PG (ref 26.8–34.3)
MCHC RBC AUTO-ENTMCNC: 33.8 G/DL (ref 31.4–37.4)
MCV RBC AUTO: 90 FL (ref 82–98)
MONOCYTES # BLD AUTO: 0.58 THOUSAND/ΜL (ref 0.17–1.22)
MONOCYTES NFR BLD AUTO: 9 % (ref 4–12)
NEUTROPHILS # BLD AUTO: 3.74 THOUSANDS/ΜL (ref 1.85–7.62)
NEUTS SEG NFR BLD AUTO: 60 % (ref 43–75)
NONHDLC SERPL-MCNC: 161 MG/DL
NRBC BLD AUTO-RTO: 0 /100 WBCS
PLATELET # BLD AUTO: 285 THOUSANDS/UL (ref 149–390)
PMV BLD AUTO: 11.2 FL (ref 8.9–12.7)
POTASSIUM SERPL-SCNC: 3.8 MMOL/L (ref 3.5–5.3)
PROT SERPL-MCNC: 7.5 G/DL (ref 6.4–8.2)
RBC # BLD AUTO: 4.67 MILLION/UL (ref 3.81–5.12)
SODIUM SERPL-SCNC: 137 MMOL/L (ref 136–145)
TRIGL SERPL-MCNC: 100 MG/DL
WBC # BLD AUTO: 6.17 THOUSAND/UL (ref 4.31–10.16)

## 2020-06-10 PROCEDURE — 86255 FLUORESCENT ANTIBODY SCREEN: CPT

## 2020-06-10 PROCEDURE — 80053 COMPREHEN METABOLIC PANEL: CPT

## 2020-06-10 PROCEDURE — 83516 IMMUNOASSAY NONANTIBODY: CPT

## 2020-06-10 PROCEDURE — 82784 ASSAY IGA/IGD/IGG/IGM EACH: CPT

## 2020-06-10 PROCEDURE — 87505 NFCT AGENT DETECTION GI: CPT

## 2020-06-10 PROCEDURE — 80061 LIPID PANEL: CPT

## 2020-06-10 PROCEDURE — 83036 HEMOGLOBIN GLYCOSYLATED A1C: CPT

## 2020-06-10 PROCEDURE — 86140 C-REACTIVE PROTEIN: CPT

## 2020-06-10 PROCEDURE — 36415 COLL VENOUS BLD VENIPUNCTURE: CPT

## 2020-06-10 PROCEDURE — 85025 COMPLETE CBC W/AUTO DIFF WBC: CPT

## 2020-06-10 PROCEDURE — 86803 HEPATITIS C AB TEST: CPT

## 2020-06-10 PROCEDURE — 83993 ASSAY FOR CALPROTECTIN FECAL: CPT

## 2020-06-10 PROCEDURE — 87338 HPYLORI STOOL AG IA: CPT

## 2020-06-11 LAB
C DIFF TOX GENS STL QL NAA+PROBE: NEGATIVE
CAMPYLOBACTER DNA SPEC NAA+PROBE: NORMAL
ENDOMYSIUM IGA SER QL: NEGATIVE
G LAMBLIA AG STL QL IA: NEGATIVE
GLIADIN PEPTIDE IGA SER-ACNC: 3 UNITS (ref 0–19)
GLIADIN PEPTIDE IGG SER-ACNC: 2 UNITS (ref 0–19)
IGA SERPL-MCNC: 81 MG/DL (ref 87–352)
SALMONELLA DNA SPEC QL NAA+PROBE: NORMAL
SHIGA TOXIN STX GENE SPEC NAA+PROBE: NORMAL
SHIGELLA DNA SPEC QL NAA+PROBE: NORMAL
TTG IGA SER-ACNC: <2 U/ML (ref 0–3)
TTG IGG SER-ACNC: <2 U/ML (ref 0–5)

## 2020-06-12 LAB — H PYLORI AG STL QL IA: NEGATIVE

## 2020-06-15 LAB — CALPROTECTIN STL-MCNT: 25 UG/G (ref 0–120)

## 2020-06-16 ENCOUNTER — TELEPHONE (OUTPATIENT)
Dept: GASTROENTEROLOGY | Facility: AMBULARY SURGERY CENTER | Age: 50
End: 2020-06-16

## 2020-06-16 NOTE — TELEPHONE ENCOUNTER
Patients GI provider:  Dr Lalo Alvarez    Number to return call: (  [phone]    Reason for call: Pt calling to schedule a 3 month follow up in July  with chaput, please assist    Scheduled procedure/appointment date if applicable: Apt/procedure  NA

## 2020-07-06 ENCOUNTER — TELEPHONE (OUTPATIENT)
Dept: GASTROENTEROLOGY | Facility: CLINIC | Age: 50
End: 2020-07-06

## 2020-07-06 ENCOUNTER — TELEMEDICINE (OUTPATIENT)
Dept: GASTROENTEROLOGY | Facility: CLINIC | Age: 50
End: 2020-07-06
Payer: COMMERCIAL

## 2020-07-06 VITALS — BODY MASS INDEX: 29.65 KG/M2 | HEIGHT: 68 IN

## 2020-07-06 DIAGNOSIS — Z11.59 NEED FOR HEPATITIS C SCREENING TEST: ICD-10-CM

## 2020-07-06 DIAGNOSIS — R79.82 ELEVATED C-REACTIVE PROTEIN (CRP): ICD-10-CM

## 2020-07-06 DIAGNOSIS — K52.9 CHRONIC DIARRHEA: Primary | ICD-10-CM

## 2020-07-06 PROCEDURE — 99214 OFFICE O/P EST MOD 30 MIN: CPT | Performed by: INTERNAL MEDICINE

## 2020-07-06 PROCEDURE — 1036F TOBACCO NON-USER: CPT | Performed by: INTERNAL MEDICINE

## 2020-07-06 RX ORDER — ESCITALOPRAM OXALATE 10 MG/1
TABLET ORAL
COMMUNITY
Start: 2020-06-15 | End: 2020-09-08 | Stop reason: ALTCHOICE

## 2020-07-06 NOTE — PROGRESS NOTES
Virtual Regular Visit      Assessment/Plan:  23-year-old female with couple month history of chronic diarrhea  Workup so far has been negative except for mildly elevated C reactive protein  She does have some crampy pain associated with this  This could be related to irritable bowel syndrome but also inflammatory bowel disease  Another differential as microscopic colitis  We will plan colonoscopy with TI intubation as well as random colon biopsies  Her IgA level was mildly low  We will add EGD with small-bowel biopsies for definitive gold standard test for celiac disease  1  Chronic diarrhea    - Na Sulfate-K Sulfate-Mg Sulf (Suprep Bowel Prep Kit) 17 5-3 13-1 6 GM/177ML SOLN; Take 1 Bottle by mouth once for 1 dose  Dispense: 1 Bottle; Refill: 0  - Colonoscopy; Future  - EGD; Future    2  Elevated C-reactive protein (CRP)    - Na Sulfate-K Sulfate-Mg Sulf (Suprep Bowel Prep Kit) 17 5-3 13-1 6 GM/177ML SOLN; Take 1 Bottle by mouth once for 1 dose  Dispense: 1 Bottle; Refill: 0  - Colonoscopy; Future  - EGD; Future    3  Need for hepatitis C screening test  -her hep C was negative    Follow-up in a few months time       Reason for visit is   Chief Complaint   Patient presents with    Virtual Regular Visit     F/U from April Blood work    Virtual Regular Visit        Encounter provider Deep Lua DO    Provider located at [facility] [address] [address] [phone]      Recent Visits  No visits were found meeting these conditions  Showing recent visits within past 7 days and meeting all other requirements     Today's Visits  Date Type Provider Dept   07/06/20 97818 Ministerio Reyes,Steve 200, DO [facility] today's visits and meeting all other requirements     Future Appointments  No visits were found meeting these conditions     Showing future appointments within next 150 days and meeting all other requirements        The patient was identified by name and date of birth  Ya Palacios was informed that this is a telemedicine visit and that the visit is being conducted through [address] and patient was informed that this is not a secure, HIPAA-complaint platform  She agrees to proceed     My office door was closed  No one else was in the room  She acknowledged consent and understanding of privacy and security of the video platform  The patient has agreed to participate and understands they can discontinue the visit at any time  Patient is aware this is a billable service  Subjective  Ya Palacios is a 53 y o  female here for follow up  The pt just returned from [place], [place]  Reports similar symptoms than her last visit  The pt reports about 4 loose BM's daily  Abdominal cramping which is relieved after a BM  No formed BM's since March when she started working from home  Still denying hematochezia, weight loss, waking up in the middle of the night  She is unsure if it is worse after certain foods  After eating, feels an urgency to have a BM  Has tried a sugar free diet-- which didn't help her stools  Past Medical History:   Diagnosis Date    Asthma     Dysfunctional uterine bleeding        Past Surgical History:   Procedure Laterality Date    DILATION AND CURETTAGE OF UTERUS      DILATION AND CURETTAGE OF UTERUS N/A 2017    Procedure: DILATATION AND CURETTAGE (D&C);   Surgeon: Med Amador MD;  [facility];  Service:     ENDOMETRIAL ABLATION N/A 2017    Procedure: Karina Pickard;  Surgeon: Med Amador MD;  [facility];  Service:    [facility] INDUCED       By Dilation and Evacuation       Current Outpatient Medications   Medication Sig Dispense Refill    albuterol (VENTOLIN HFA) 90 mcg/act inhaler Inhale 2 puffs every 4 (four) hours      escitalopram (LEXAPRO) 10 mg tablet       Tranexamic Acid 650 MG TABS 2 tablets by mouth 3 times daily for up to 5 consecutive days as needed for heavy menstrual bleeding  30 tablet 1    escitalopram (LEXAPRO) 20 mg tablet Take 1 tablet (20 mg total) by mouth daily 30 tablet 3     No current facility-administered medications for this visit  No Known Allergies    Video Exam    Vitals:    07/06/20 0747   Height: 5' 8" (1 727 m)     REVIEW OF SYSTEMS:    CONSTITUTIONAL: Denies any fever, chills, rigors, and weight loss  HEENT: No earache or tinnitus  Denies hearing loss or visual disturbances  CARDIOVASCULAR: No chest pain or palpitations  RESPIRATORY: Denies any cough, hemoptysis, shortness of breath or dyspnea on exertion  GASTROINTESTINAL: As noted in the History of Present Illness  GENITOURINARY: No problems with urination  Denies any hematuria or dysuria  NEUROLOGIC: No dizziness or vertigo, denies headaches  MUSCULOSKELETAL: Denies any muscle or joint pain  SKIN: Denies skin rashes or itching  ENDOCRINE: Denies excessive thirst  Denies intolerance to heat or cold  PSYCHOSOCIAL: Denies depression or anxiety  Denies any recent memory loss  PHYSICAL EXAMINATION:    General Appearance:   Alert, cooperative, no distress   HEENT:  Normocephalic, atraumatic, anicteric  Lungs:   Equal chest rise and unlabored breathing, normal effort, no coughing  Cardiovascular:   No visualized JVD  Abdomen:   No abdominal distension  Skin:   No jaundice  Musculoskeletal:   Normal range of motion visualized  Psych:  Normal affect and normal insight  Neuro:  Alert and appropriate  Labs:  CRP mildly elevated, fecal calprotectin neg  H pylori Stool Ag neg  Stool enteric neg  Celiac panel neg except for mildly decreased IgA      As a result of this visit, I have not referred the patient for further respiratory evaluation      I spent 13 minutes directly with the patient during this visit      Dave Mansfield acknowledges that she has consented to an online visit or consultation  She understands that the online visit is based solely on information provided by her, and that, in the absence of a face-to-face physical evaluation by the physician, the diagnosis she receives is both limited and provisional in terms of accuracy and completeness  This is not intended to replace a full medical face-to-face evaluation by the physician  Taisha De Oliveira understands and accepts these terms

## 2020-07-06 NOTE — TELEPHONE ENCOUNTER
Colon/EGD scheduled for 08/14/20 with Dr Andrade [facility]  Christyne Peabody instructions given verbally/mailed to patient  She is aware to complete COVID test 5 days prior to procedure

## 2020-08-08 ENCOUNTER — TELEPHONE (OUTPATIENT)
Dept: OTHER | Facility: OTHER | Age: 50
End: 2020-08-08

## 2020-08-08 NOTE — TELEPHONE ENCOUNTER
[place] connect:    [phone] / Esequiel Said / [phone] / Dr Alexandra Acevedo pt / Pt needs Devlaura tested done before surgery, but the order has not been placed  Patient asking if test can be ordered as she wants to get test done today  Please call pt per her request to advise order was placed

## 2020-08-13 ENCOUNTER — ANESTHESIA EVENT (OUTPATIENT)
Dept: GASTROENTEROLOGY | Facility: HOSPITAL | Age: 50
End: 2020-08-13

## 2020-08-14 ENCOUNTER — ANESTHESIA (OUTPATIENT)
Dept: GASTROENTEROLOGY | Facility: HOSPITAL | Age: 50
End: 2020-08-14

## 2020-08-14 ENCOUNTER — HOSPITAL ENCOUNTER (OUTPATIENT)
Dept: GASTROENTEROLOGY | Facility: HOSPITAL | Age: 50
Setting detail: OUTPATIENT SURGERY
Discharge: HOME | End: 2020-08-14
Attending: INTERNAL MEDICINE | Admitting: INTERNAL MEDICINE
Payer: COMMERCIAL

## 2020-08-14 VITALS
WEIGHT: 195 LBS | OXYGEN SATURATION: 99 % | TEMPERATURE: 99.2 F | HEIGHT: 68 IN | DIASTOLIC BLOOD PRESSURE: 66 MMHG | SYSTOLIC BLOOD PRESSURE: 109 MMHG | BODY MASS INDEX: 29.55 KG/M2 | RESPIRATION RATE: 18 BRPM | HEART RATE: 57 BPM

## 2020-08-14 DIAGNOSIS — K52.9 CHRONIC DIARRHEA: ICD-10-CM

## 2020-08-14 DIAGNOSIS — R79.82 ELEVATED C-REACTIVE PROTEIN (CRP): ICD-10-CM

## 2020-08-14 PROCEDURE — 43239 EGD BIOPSY SINGLE/MULTIPLE: CPT | Performed by: INTERNAL MEDICINE

## 2020-08-14 PROCEDURE — 45385 COLONOSCOPY W/LESION REMOVAL: CPT | Performed by: INTERNAL MEDICINE

## 2020-08-14 PROCEDURE — 88305 TISSUE EXAM BY PATHOLOGIST: CPT | Performed by: PATHOLOGY

## 2020-08-14 PROCEDURE — 45380 COLONOSCOPY AND BIOPSY: CPT | Performed by: INTERNAL MEDICINE

## 2020-08-14 PROCEDURE — NC001 PR NO CHARGE: Performed by: INTERNAL MEDICINE

## 2020-08-14 RX ORDER — PROPOFOL 10 MG/ML
INJECTION, EMULSION INTRAVENOUS CONTINUOUS PRN
Status: DISCONTINUED | OUTPATIENT
Start: 2020-08-14 | End: 2020-08-14

## 2020-08-14 RX ORDER — PROPOFOL 10 MG/ML
INJECTION, EMULSION INTRAVENOUS AS NEEDED
Status: DISCONTINUED | OUTPATIENT
Start: 2020-08-14 | End: 2020-08-14

## 2020-08-14 RX ORDER — SODIUM CHLORIDE 9 MG/ML
INJECTION, SOLUTION INTRAVENOUS CONTINUOUS PRN
Status: DISCONTINUED | OUTPATIENT
Start: 2020-08-14 | End: 2020-08-14

## 2020-08-14 RX ORDER — LIDOCAINE HYDROCHLORIDE 10 MG/ML
INJECTION, SOLUTION EPIDURAL; INFILTRATION; INTRACAUDAL; PERINEURAL AS NEEDED
Status: DISCONTINUED | OUTPATIENT
Start: 2020-08-14 | End: 2020-08-14

## 2020-08-14 RX ADMIN — SODIUM CHLORIDE: 9 INJECTION, SOLUTION INTRAVENOUS at 12:38

## 2020-08-14 RX ADMIN — PROPOFOL 50 MG: 10 INJECTION, EMULSION INTRAVENOUS at 12:48

## 2020-08-14 RX ADMIN — PROPOFOL 40 MG: 10 INJECTION, EMULSION INTRAVENOUS at 12:57

## 2020-08-14 RX ADMIN — PROPOFOL 130 MCG/KG/MIN: 10 INJECTION, EMULSION INTRAVENOUS at 12:47

## 2020-08-14 RX ADMIN — PROPOFOL 100 MG: 10 INJECTION, EMULSION INTRAVENOUS at 12:46

## 2020-08-14 RX ADMIN — LIDOCAINE HYDROCHLORIDE 100 MG: 10 INJECTION, SOLUTION EPIDURAL; INFILTRATION; INTRACAUDAL; PERINEURAL at 12:46

## 2020-08-14 NOTE — ANESTHESIA POSTPROCEDURE EVALUATION
Post-Op Assessment Note    CV Status:  Stable  Pain Score: 0    Pain management: adequate     Mental Status:  Alert and awake   Hydration Status:  Stable   PONV Controlled:  None   Airway Patency:  Patent      Post Op Vitals Reviewed: Yes      Staff: CRNA         No complications documented      /64 (08/14/20 1343)    Temp      Pulse 58 (08/14/20 1341)   Resp 18 (08/14/20 1341)    SpO2 99 % (08/14/20 1341)

## 2020-08-14 NOTE — ANESTHESIA PREPROCEDURE EVALUATION
Procedure:  COLONOSCOPY  EGD    Relevant Problems   Other   (+) Chronic diarrhea   (+) Colon cancer screening   (+) Menorrhagia with regular cycle   (+) Need for hepatitis C screening test   (+) Overweight      No results found for this or any previous visit (from the past 36 hour(s))  No complications documented  Physical Exam    Airway    Mallampati score: II  TM Distance: >3 FB  Neck ROM: full     Dental   No notable dental hx     Cardiovascular  Rhythm: regular, Rate: normal, No murmur,     Pulmonary  Breath sounds clear to auscultation,     Other Findings        Anesthesia Plan  ASA Score- 2     Anesthesia Type- IV sedation with anesthesia with ASA Monitors  Additional Monitors:   Airway Plan:           Plan Factors-    Chart reviewed  Patient summary reviewed  Induction-     Postoperative Plan-     Informed Consent- Anesthetic plan and risks discussed with patient and spouse  I personally reviewed this patient with the CRNA  Discussed and agreed on the Anesthesia Plan with the KIM Kim

## 2020-08-14 NOTE — H&P
History and Physical - [facility]  Taisha Flowood 53 y o  female MRN: [id number]                  HPI: Taisha De Oliveira is a 53y o  year old female who presents for EGD/colonoscopy for evaluation of chronic diarrhea  REVIEW OF SYSTEMS: Per the HPI, and otherwise unremarkable  Historical Information   Past Medical History:   Diagnosis Date    Asthma     Dysfunctional uterine bleeding      Past Surgical History:   Procedure Laterality Date    DILATION AND CURETTAGE OF UTERUS      DILATION AND CURETTAGE OF UTERUS N/A 2017    Procedure: DILATATION AND CURETTAGE (D&C);   Surgeon: Molly Reyna MD;  [facility];  Service:     ENDOMETRIAL ABLATION N/A 2017    Procedure: Veto Krzysztof;  Surgeon: Molly Reyna MD;  [facility];  Service:    [facility] INDUCED       By Dilation and Evacuation     Social History   Social History     Substance and Sexual Activity   Alcohol Use Yes    Comment: socially     Social History     Substance and Sexual Activity   Drug Use No     Social History     Tobacco Use   Smoking Status Former Smoker    Packs/day: 1 00    Years: 0 00    Pack years: 0 00   Smokeless Tobacco Former User    Quit date:      Family History   Problem Relation Age of Onset    Cancer Mother     Hypothyroidism Mother     Lung cancer Mother 72    Heart disease Father     Venous thrombosis Father         of Deep Vessels of Lower Extremity    Aortic aneurysm Father     Breast cancer Paternal Grandmother 61    Hypertension Paternal Grandfather     Breast cancer Maternal Aunt     No Known Problems Maternal Aunt     No Known Problems Maternal Aunt     Breast cancer Paternal Aunt     Breast cancer Paternal Aunt     No Known Problems Paternal Aunt     No Known Problems Paternal Aunt        Meds/Allergies     (Not in a hospital admission)      No Known Allergies    Objective     Blood pressure 134/74, pulse 63, temperature 99 2 °F (37 3 °C), temperature source Tympanic, resp  rate 20, height 5' 8" (1 727 m), weight 88 5 kg (195 lb), last menstrual period 08/13/2020, SpO2 95 %, not currently breastfeeding  PHYSICAL EXAM    Gen: NAD  CV: RRR  CHEST: Clear  ABD: soft, NT/ND  EXT: no edema      ASSESSMENT/PLAN:  This is a 53y o  year old female here for EGD/colonoscopy for evaluation of chronic diarrhea  Patient is stable and optimized for procedure      PLAN:   Procedure:  EGD/colonoscopy

## 2020-08-19 ENCOUNTER — TELEPHONE (OUTPATIENT)
Dept: GASTROENTEROLOGY | Facility: CLINIC | Age: 50
End: 2020-08-19

## 2020-08-19 DIAGNOSIS — K52.832 LYMPHOCYTIC COLITIS: Primary | ICD-10-CM

## 2020-08-19 RX ORDER — BUDESONIDE 9 MG/1
9 TABLET, FILM COATED, EXTENDED RELEASE ORAL DAILY
Qty: 30 TABLET | Refills: 1 | Status: SHIPPED | OUTPATIENT
Start: 2020-08-19 | End: 2020-09-08 | Stop reason: ALTCHOICE

## 2020-08-19 NOTE — TELEPHONE ENCOUNTER
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

## 2020-09-08 ENCOUNTER — OFFICE VISIT (OUTPATIENT)
Dept: FAMILY MEDICINE CLINIC | Facility: CLINIC | Age: 50
End: 2020-09-08
Payer: COMMERCIAL

## 2020-09-08 VITALS
HEIGHT: 68 IN | WEIGHT: 195 LBS | BODY MASS INDEX: 29.55 KG/M2 | OXYGEN SATURATION: 98 % | DIASTOLIC BLOOD PRESSURE: 80 MMHG | TEMPERATURE: 98.4 F | HEART RATE: 64 BPM | SYSTOLIC BLOOD PRESSURE: 124 MMHG

## 2020-09-08 DIAGNOSIS — K52.9 COLITIS: Primary | ICD-10-CM

## 2020-09-08 DIAGNOSIS — N92.1 MENORRHAGIA WITH IRREGULAR CYCLE: ICD-10-CM

## 2020-09-08 PROCEDURE — 99214 OFFICE O/P EST MOD 30 MIN: CPT | Performed by: FAMILY MEDICINE

## 2020-09-08 NOTE — PROGRESS NOTES
BMI Counseling: Body mass index is 29 65 kg/m²  The BMI is above normal  Nutrition recommendations include reducing portion sizes and decreasing overall calorie intake  Depression Screening Follow-up Plan: Patient's depression screening was positive with a PHQ-2 score of 2  Their PHQ-9 score was 10   Clinically patient does not have depression  No treatment is required  She has been having some nguyen issues and is on lexapro, but denies any true depression  She thinks she feels better when heavy menses and colitis are cleared up  Patient ID: Meryl Iraheta is a 53 y o  female  HPI: 53 y  o female presenting with colitis which showed up on biopsies taken during a colonoscopy  Pt had been having loose stools >2 mos  Sit was recommended pt take budesonade, but she wanted to hold off until seen by myself  We discussed in detail in detail colitis and treatment and she is willing to go forth at this time  She also is having heavy menses 10 days a month and spotting for another 10days, and has failed hormone therapy and an ablation    We discussed her going for a second opnion with a different gynecologist       SUBJECTIVE    Family History   Problem Relation Age of Onset    Cancer Mother     Hypothyroidism Mother     Lung cancer Mother 72    Heart disease Father     Venous thrombosis Father         of Deep Vessels of Lower Extremity    Aortic aneurysm Father     Breast cancer Paternal Grandmother 61    Hypertension Paternal Grandfather     Breast cancer Maternal Aunt     No Known Problems Maternal Aunt     No Known Problems Maternal Aunt     Breast cancer Paternal Aunt     Breast cancer Paternal Aunt     No Known Problems Paternal Aunt     No Known Problems Paternal Aunt      Social History     Socioeconomic History    Marital status: /Civil Union     Spouse name: Not on file    Number of children: Not on file    Years of education: Not on file    Highest education level: Not on file   Occupational History    Not on file   Social Needs    Financial resource strain: Not on file    Food insecurity     Worry: Not on file     Inability: Not on file    Transportation needs     Medical: Not on file     Non-medical: Not on file   Tobacco Use    Smoking status: Former Smoker     Packs/day: 1 00     Years: 0 00     Pack years: 0 00    Smokeless tobacco: Former User     Quit date:    Substance and Sexual Activity    Alcohol use: Yes     Comment: socially    Drug use: No    Sexual activity: Yes     Partners: Male     Birth control/protection: None, Condom Male   Lifestyle    Physical activity     Days per week: Not on file     Minutes per session: Not on file    Stress: Not on file   Relationships    Social connections     Talks on phone: Not on file     Gets together: Not on file     Attends Mandaen service: Not on file     Active member of club or organization: Not on file     Attends meetings of clubs or organizations: Not on file     Relationship status: Not on file    Intimate partner violence     Fear of current or ex partner: Not on file     Emotionally abused: Not on file     Physically abused: Not on file     Forced sexual activity: Not on file   Other Topics Concern    Not on file   Social History Narrative    Daily coffee consumption , 2 cups/day    Exercise regularly     Past Medical History:   Diagnosis Date    Asthma     Dysfunctional uterine bleeding      Past Surgical History:   Procedure Laterality Date    DILATION AND CURETTAGE OF UTERUS      DILATION AND CURETTAGE OF UTERUS N/A 2017    Procedure: DILATATION AND CURETTAGE (D&C);   Surgeon: Leonel Schafer MD;  [facility];  Service:     ENDOMETRIAL ABLATION N/A 2017    Procedure: Jimenez Sultana;  Surgeon: Leonel Schafer MD;  [facility];  Service:     INDUCED       By Dilation and Evacuation     No Known Allergies    Current Outpatient Medications:    escitalopram (LEXAPRO) 20 mg tablet, Take 1 tablet (20 mg total) by mouth daily, Disp: 30 tablet, Rfl: 3    Budesonide ER 9 MG CP24, Take 1 tablet by mouth daily, Disp: 30 capsule, Rfl: 2    Review of Systems  Constitutional:     Denies fever, chills ,fatigue ,weakness ,weight loss, weight gain     ENT: Denies earache ,loss of hearing ,nosebleed, nasal discharge,nasal congestion ,sore throat ,hoarseness  Pulmonary: Denies shortness of breath ,cough  ,dyspnea on exertion, orthopnea  ,PND   Cardiovascular:  Denies bradycardia , tachycardia  ,palpations, lower extremity edema leg, claudication  Breast:  Denies new or changing breast lumps ,nipple discharge ,nipple changes  Abdomen:  Denies abdominal pain , anorexia , indigestion, nausea, vomiting, constipation, + chronic diarrhea without melena or hematochezia  Musculoskeletal: Denies myalgias, arthralgias, joint swelling, joint stiffness , limb pain, limb swelling  Gu: denies dysuria, polyuria + menorrhagia  Skin: Denies skin rash, skin lesion, skin wound, itching, dry skin  Neuro: Denies headache, numbness, tingling, confusion, loss of consciousness, dizziness, vertigo  Psychiatric: Denies feelings of depression, suicidal ideation, anxiety, sleep disturbances    OBJECTIVE  /80   Pulse 64   Temp 98 4 °F (36 9 °C)   Ht 5' 8" (1 727 m)   Wt 88 5 kg (195 lb)   LMP 08/13/2020   SpO2 98%   BMI 29 65 kg/m²   Constitutional:   NAD, well appearing and well nourished      ENT:   Conjunctiva and lids: no injection, edema, or discharge     Pupils and iris: TREASURE bilaterally    External inspection of ears and nose: normal without deformities or discharge  Otoscopic exam: Canals patent without erythema  Nasal mucosa, septum and turbinates: Normal or edema or discharge         Oropharynx:  Moist mucosa, normal tongue and tonsils without lesions  No erythema        Pulmonary:Respiratory effort normal rate and rhythm, no increased work of breathing  Auscultation of lungs:  Clear bilaterally with no adventitious breath sounds       Cardiovascular: regular rate and rhythm, S1 and S2, no murmur, no edema and/or varicosities of LE      Abdomen: Soft and non-distended     Positive bowel sounds      No heptomegaly or splenomegaly      Gu: no suprapubic tenderness or CVA tenderness, no urethral discharge  Lymphatic:  No anterior or posterior cervical lymphadenopathy         Musculoskeletal:  Gait and station: Normal gait      Digits and nails normal without clubbing or cyanosis       Inspection/palpation of joints, bones, and muscles:  No joint tenderness, swelling, full active and passive range of motion       Skin: Normal skin turgor and no rashes      Neuro:    Normal reflexes       Psych:   alert and oriented to person, place and time     normal mood and affect       Assessment/Plan:Diagnoses and all orders for this visit:    Colitis  -     Budesonide ER 9 MG CP24; Take 1 tablet by mouth daily    Menorrhagia with irregular cycle  -     Ambulatory referral to Obstetrics / Gynecology; Future        Reviewed with patient plan to treat with plan as above      Patient instructed to call in 72 hours if not feeling better or if symptoms worsen

## 2020-10-06 ENCOUNTER — OFFICE VISIT (OUTPATIENT)
Dept: FAMILY MEDICINE CLINIC | Facility: CLINIC | Age: 50
End: 2020-10-06
Payer: COMMERCIAL

## 2020-10-06 VITALS
OXYGEN SATURATION: 97 % | SYSTOLIC BLOOD PRESSURE: 110 MMHG | HEART RATE: 62 BPM | DIASTOLIC BLOOD PRESSURE: 72 MMHG | WEIGHT: 195 LBS | BODY MASS INDEX: 29.55 KG/M2 | TEMPERATURE: 98.3 F | HEIGHT: 68 IN

## 2020-10-06 DIAGNOSIS — K52.9 COLITIS: ICD-10-CM

## 2020-10-06 DIAGNOSIS — F32.A DEPRESSION, UNSPECIFIED DEPRESSION TYPE: Primary | ICD-10-CM

## 2020-10-06 PROCEDURE — 99213 OFFICE O/P EST LOW 20 MIN: CPT | Performed by: FAMILY MEDICINE

## 2020-10-06 PROCEDURE — 3725F SCREEN DEPRESSION PERFORMED: CPT | Performed by: FAMILY MEDICINE

## 2020-10-06 RX ORDER — ESCITALOPRAM OXALATE 10 MG/1
10 TABLET ORAL DAILY
COMMUNITY
End: 2020-10-06 | Stop reason: ALTCHOICE

## 2020-10-06 RX ORDER — ESCITALOPRAM OXALATE 20 MG/1
30 TABLET ORAL DAILY
Qty: 30 TABLET | Refills: 3 | Status: SHIPPED | OUTPATIENT
Start: 2020-10-06 | End: 2020-12-08

## 2020-10-12 ENCOUNTER — OFFICE VISIT (OUTPATIENT)
Dept: OBGYN CLINIC | Facility: CLINIC | Age: 50
End: 2020-10-12
Payer: COMMERCIAL

## 2020-10-12 VITALS
TEMPERATURE: 97.3 F | BODY MASS INDEX: 34.82 KG/M2 | WEIGHT: 229 LBS | SYSTOLIC BLOOD PRESSURE: 108 MMHG | DIASTOLIC BLOOD PRESSURE: 68 MMHG

## 2020-10-12 DIAGNOSIS — N95.1 PERIMENOPAUSAL: ICD-10-CM

## 2020-10-12 DIAGNOSIS — N93.9 ABNORMAL UTERINE BLEEDING (AUB): Primary | ICD-10-CM

## 2020-10-12 PROBLEM — Z01.419 ENCOUNTER FOR GYNECOLOGICAL EXAMINATION (GENERAL) (ROUTINE) WITHOUT ABNORMAL FINDINGS: Status: RESOLVED | Noted: 2020-02-07 | Resolved: 2020-10-12

## 2020-10-12 PROBLEM — Z11.59 NEED FOR HEPATITIS C SCREENING TEST: Status: RESOLVED | Noted: 2020-07-06 | Resolved: 2020-10-12

## 2020-10-12 PROBLEM — Z12.11 COLON CANCER SCREENING: Status: RESOLVED | Noted: 2020-02-07 | Resolved: 2020-10-12

## 2020-10-12 PROBLEM — Z98.890 HISTORY OF ENDOMETRIAL ABLATION: Status: ACTIVE | Noted: 2020-10-12

## 2020-10-12 LAB — SL AMB POCT URINE HCG: NEGATIVE

## 2020-10-12 PROCEDURE — 88305 TISSUE EXAM BY PATHOLOGIST: CPT | Performed by: PATHOLOGY

## 2020-10-12 PROCEDURE — 81025 URINE PREGNANCY TEST: CPT | Performed by: OBSTETRICS & GYNECOLOGY

## 2020-10-12 PROCEDURE — 99214 OFFICE O/P EST MOD 30 MIN: CPT | Performed by: OBSTETRICS & GYNECOLOGY

## 2020-10-12 PROCEDURE — 1036F TOBACCO NON-USER: CPT | Performed by: OBSTETRICS & GYNECOLOGY

## 2020-10-12 PROCEDURE — 58100 BIOPSY OF UTERUS LINING: CPT | Performed by: OBSTETRICS & GYNECOLOGY

## 2020-10-19 ENCOUNTER — HOSPITAL ENCOUNTER (OUTPATIENT)
Dept: ULTRASOUND IMAGING | Facility: HOSPITAL | Age: 50
Discharge: HOME | End: 2020-10-19
Attending: OBSTETRICS & GYNECOLOGY
Payer: COMMERCIAL

## 2020-10-19 DIAGNOSIS — N93.9 ABNORMAL UTERINE BLEEDING (AUB): ICD-10-CM

## 2020-10-19 PROCEDURE — 76830 TRANSVAGINAL US NON-OB: CPT

## 2020-10-19 PROCEDURE — 76856 US EXAM PELVIC COMPLETE: CPT

## 2020-10-28 ENCOUNTER — TELEPHONE (OUTPATIENT)
Dept: OBGYN CLINIC | Facility: CLINIC | Age: 50
End: 2020-10-28

## 2020-12-04 ENCOUNTER — HOSPITAL ENCOUNTER (OUTPATIENT)
Dept: RADIOLOGY | Age: 50
Discharge: HOME | End: 2020-12-04
Payer: COMMERCIAL

## 2020-12-04 VITALS — BODY MASS INDEX: 34.71 KG/M2 | WEIGHT: 229 LBS | HEIGHT: 68 IN

## 2020-12-04 DIAGNOSIS — Z12.31 ENCOUNTER FOR SCREENING MAMMOGRAM FOR MALIGNANT NEOPLASM OF BREAST: ICD-10-CM

## 2020-12-04 PROCEDURE — 77063 BREAST TOMOSYNTHESIS BI: CPT

## 2020-12-04 PROCEDURE — 77067 SCR MAMMO BI INCL CAD: CPT

## 2020-12-08 DIAGNOSIS — F32.A DEPRESSION, UNSPECIFIED DEPRESSION TYPE: ICD-10-CM

## 2020-12-08 RX ORDER — ESCITALOPRAM OXALATE 20 MG/1
TABLET ORAL
Qty: 30 TABLET | Refills: 3 | Status: SHIPPED | OUTPATIENT
Start: 2020-12-08 | End: 2021-03-22

## 2020-12-16 ENCOUNTER — TELEPHONE (OUTPATIENT)
Dept: OBGYN CLINIC | Facility: CLINIC | Age: 50
End: 2020-12-16

## 2020-12-31 ENCOUNTER — TELEPHONE (OUTPATIENT)
Dept: FAMILY MEDICINE CLINIC | Facility: CLINIC | Age: 50
End: 2020-12-31

## 2020-12-31 DIAGNOSIS — K21.9 GASTROESOPHAGEAL REFLUX DISEASE WITHOUT ESOPHAGITIS: Primary | ICD-10-CM

## 2020-12-31 RX ORDER — PANTOPRAZOLE SODIUM 40 MG/1
40 TABLET, DELAYED RELEASE ORAL
Qty: 30 TABLET | Refills: 5 | Status: SHIPPED | OUTPATIENT
Start: 2020-12-31 | End: 2021-01-18

## 2021-01-18 ENCOUNTER — OFFICE VISIT (OUTPATIENT)
Dept: OBGYN CLINIC | Facility: CLINIC | Age: 51
End: 2021-01-18
Payer: COMMERCIAL

## 2021-01-18 VITALS — DIASTOLIC BLOOD PRESSURE: 68 MMHG | SYSTOLIC BLOOD PRESSURE: 116 MMHG

## 2021-01-18 DIAGNOSIS — N93.9 ABNORMAL UTERINE BLEEDING (AUB): Primary | ICD-10-CM

## 2021-01-18 DIAGNOSIS — Z01.818 PREOPERATIVE EXAM FOR GYNECOLOGIC SURGERY: ICD-10-CM

## 2021-01-18 PROCEDURE — 99214 OFFICE O/P EST MOD 30 MIN: CPT | Performed by: OBSTETRICS & GYNECOLOGY

## 2021-01-18 PROCEDURE — 1036F TOBACCO NON-USER: CPT | Performed by: OBSTETRICS & GYNECOLOGY

## 2021-01-18 NOTE — H&P (VIEW-ONLY)
Assessment /Plan:     53 y o  [facility] with Patient's last menstrual period was 2020 (exact date)  with heavy periods and irregular periods  for TVH, B/l salpingectomy and cystoscopy   The risks (infection, bleeding, transfusion, damage to adjacent organs requiring immediate and/or delayed repair, clots inside blood vessels, need to complete procedure using alternative approach, procedural failure, worsening of pre-exisiting medical condition, and death) and alternatives  have been discussed and patient desires to proceed with TVH, b/l salpingectomy and cystoscopy at [facility] on 2021   Consent was reviewed in detail and signed today  Preoperative testing and antibiotics were discussed   Postoperative course discussed and post op medications were reviewed     Chief Complaint: Heavy and irreg periods despite an ablation    HPI:  Pt is a 53 y o  [id number] with Patient's last menstrual period was 2020 (exact date)  using condoms for contraception presents c/o heavy periods and irregular periods  She had an endometrial ablation about 3 years ago with minimal improvement in symptoms  She is requesting definitive surgical treatment with hysterectomy    PMHx:   Past Medical History:   Diagnosis Date    Asthma     Colitis     Dysfunctional uterine bleeding        PSHx:   Past Surgical History:   Procedure Laterality Date    DILATION AND CURETTAGE OF UTERUS      DILATION AND CURETTAGE OF UTERUS N/A 2017    Procedure: DILATATION AND CURETTAGE (D&C);   Surgeon: Penelope Gaming MD;  [facility];  Service:     ENDOMETRIAL ABLATION N/A 2017    Procedure: Ceasar Best;  Surgeon: Penelope Gaming MD;  [facility];  Service:    [facility] INDUCED       By Dilation and Evacuation       Meds: (Not in a hospital admission)      Allergies: No Known Allergies    Social Hx:    Social History     Socioeconomic History    Marital status: /Civil Union     Spouse name: None    Number of children: None    Years of education: None    Highest education level: None   Occupational History    None   Social Needs    Financial resource strain: None    Food insecurity     Worry: None     Inability: None    Transportation needs     Medical: None     Non-medical: None   Tobacco Use    Smoking status: Former Smoker     Packs/day: 1 00     Years: 0 00     Pack years: 0 00    Smokeless tobacco: Former User     Quit date:    Substance and Sexual Activity    Alcohol use: Yes     Comment: socially    Drug use: No    Sexual activity: Yes     Partners: Male     Birth control/protection: None, Condom Male   Lifestyle    Physical activity     Days per week: None     Minutes per session: None    Stress: None   Relationships    Social connections     Talks on phone: None     Gets together: None     Attends Denominational service: None     Active member of club or organization: None     Attends meetings of clubs or organizations: None     Relationship status: None    Intimate partner violence     Fear of current or ex partner: None     Emotionally abused: None     Physically abused: None     Forced sexual activity: None   Other Topics Concern    None   Social History Narrative    Daily coffee consumption , 2 cups/day    Exercise regularly       Ob Hx:   OB History    Para Term  AB Living   2 2 2     2   SAB TAB Ectopic Multiple Live Births           2      # Outcome Date GA Lbr Chaim/2nd Weight Sex Delivery Anes PTL Lv   2 Term      Vag-Spont   RANJITH   1 Term      Vag-Spont   RANJITH       Gyn HX:  heavy and irregular menses    Fm Hx:   Family History   Problem Relation Age of Onset    Cancer Mother     Hypothyroidism Mother     Lung cancer Mother 72    Heart disease Father     Venous thrombosis Father         of Deep Vessels of Lower Extremity    Aortic aneurysm Father     Breast cancer Paternal Grandmother 61    Hypertension Paternal Grandfather     Breast cancer Maternal Aunt 48    No Known Problems Maternal Aunt     No Known Problems Maternal Aunt     Breast cancer Paternal Aunt 48    Breast cancer Paternal Aunt 48    No Known Problems Paternal Aunt     No Known Problems Paternal [de-identified]     Esophageal cancer Paternal Uncle 61       ROS:  Review of Systems   Constitutional: Negative  HENT: Negative  Respiratory: Negative  Cardiovascular: Negative  Gastrointestinal: Negative  Genitourinary: Positive for menstrual problem  Negative for pelvic pain, vaginal bleeding, vaginal discharge and vaginal pain  Psychiatric/Behavioral: Negative  VS:  /68   LMP 12/27/2020 (Exact Date)   Breastfeeding No       Exam:    Physical Exam  Vitals signs and nursing note reviewed  Constitutional:       Appearance: Normal appearance  She is normal weight  HENT:      Head: Normocephalic and atraumatic  Eyes:      Extraocular Movements: Extraocular movements intact  Conjunctiva/sclera: Conjunctivae normal       Pupils: Pupils are equal, round, and reactive to light  Cardiovascular:      Rate and Rhythm: Normal rate and regular rhythm  Heart sounds: Normal heart sounds  No murmur  Pulmonary:      Effort: Pulmonary effort is normal  No respiratory distress  Breath sounds: Normal breath sounds  No wheezing  Skin:     General: Skin is warm and dry  Neurological:      General: No focal deficit present  Mental Status: She is alert and oriented to person, place, and time     Psychiatric:         Mood and Affect: Mood normal          Behavior: Behavior normal      Pelvic - deferred today - see date of EMB

## 2021-01-18 NOTE — PROGRESS NOTES
Assessment /Plan:     53 y o  [facility] with Patient's last menstrual period was 2020 (exact date)  with heavy periods and irregular periods  for TVH, B/l salpingectomy and cystoscopy   The risks (infection, bleeding, transfusion, damage to adjacent organs requiring immediate and/or delayed repair, clots inside blood vessels, need to complete procedure using alternative approach, procedural failure, worsening of pre-exisiting medical condition, and death) and alternatives  have been discussed and patient desires to proceed with TVH, b/l salpingectomy and cystoscopy at [address] [place] [address] on 2021   Consent was reviewed in detail and signed today  Preoperative testing and antibiotics were discussed   Postoperative course discussed and post op medications were reviewed     Chief Complaint: Heavy and irreg periods despite an ablation    HPI:  Pt is a 53 y o  [id number] with Patient's last menstrual period was 2020 (exact date)  using condoms for contraception presents c/o heavy periods and irregular periods  She had an endometrial ablation about 3 years ago with minimal improvement in symptoms  She is requesting definitive surgical treatment with hysterectomy    PMHx:   Past Medical History:   Diagnosis Date    Asthma     Colitis     Dysfunctional uterine bleeding        PSHx:   Past Surgical History:   Procedure Laterality Date    DILATION AND CURETTAGE OF UTERUS      DILATION AND CURETTAGE OF UTERUS N/A 2017    Procedure: DILATATION AND CURETTAGE (D&C);   Surgeon: Behzad Potter MD;  [facility];  Service:     ENDOMETRIAL ABLATION N/A 2017    Procedure: Arya Belle;  Surgeon: Behzad Potter MD;  [facility];  [facility]    [facility]       By Dilation and Evacuation       Meds: (Not in a hospital admission)      Allergies: No Known Allergies    Social Hx:    Social History     Socioeconomic History    Marital status: /Civil Union     Spouse name: None    Number of children: None    Years of education: None    Highest education level: None   Occupational History    None   Social Needs    Financial resource strain: None    Food insecurity     Worry: None     Inability: None    Transportation needs     Medical: None     Non-medical: None   Tobacco Use    Smoking status: Former Smoker     Packs/day: 1 00     Years: 0 00     Pack years: 0 00    Smokeless tobacco: Former User     Quit date:    Substance and Sexual Activity    Alcohol use: Yes     Comment: socially    Drug use: No    Sexual activity: Yes     Partners: Male     Birth control/protection: None, Condom Male   Lifestyle    Physical activity     Days per week: None     Minutes per session: None    Stress: None   Relationships    Social connections     Talks on phone: None     Gets together: None     Attends Anabaptism service: None     Active member of club or organization: None     Attends meetings of clubs or organizations: None     Relationship status: None    Intimate partner violence     Fear of current or ex partner: None     Emotionally abused: None     Physically abused: None     Forced sexual activity: None   Other Topics Concern    None   Social History Narrative    Daily coffee consumption , 2 cups/day    Exercise regularly       Ob Hx:   OB History    Para Term  AB Living   2 2 2     2   SAB TAB Ectopic Multiple Live Births           2      # Outcome Date GA Lbr Chaim/2nd Weight Sex Delivery Anes PTL Lv   2 Term      Vag-Spont   RANJITH   1 Term      Vag-Spont   RANJITH       Gyn HX:  heavy and irregular menses    Fm Hx:   Family History   Problem Relation Age of Onset    Cancer Mother     Hypothyroidism Mother     Lung cancer Mother 72    Heart disease Father     Venous thrombosis Father         of Deep Vessels of Lower Extremity    Aortic aneurysm Father     Breast cancer Paternal Grandmother 61    Hypertension Paternal Grandfather     Breast cancer Maternal Aunt 48    No Known Problems Maternal Aunt     No Known Problems Maternal Aunt     Breast cancer Paternal Aunt 48    Breast cancer Paternal Aunt 48    No Known Problems Paternal Aunt     No Known Problems Paternal [de-identified]     Esophageal cancer Paternal Uncle 61       ROS:  Review of Systems   Constitutional: Negative  HENT: Negative  Respiratory: Negative  Cardiovascular: Negative  Gastrointestinal: Negative  Genitourinary: Positive for menstrual problem  Negative for pelvic pain, vaginal bleeding, vaginal discharge and vaginal pain  Psychiatric/Behavioral: Negative  VS:  /68   LMP 12/27/2020 (Exact Date)   Breastfeeding No       Exam:    Physical Exam  Vitals signs and nursing note reviewed  Constitutional:       Appearance: Normal appearance  She is normal weight  HENT:      Head: Normocephalic and atraumatic  Eyes:      Extraocular Movements: Extraocular movements intact  Conjunctiva/sclera: Conjunctivae normal       Pupils: Pupils are equal, round, and reactive to light  Cardiovascular:      Rate and Rhythm: Normal rate and regular rhythm  Heart sounds: Normal heart sounds  No murmur  Pulmonary:      Effort: Pulmonary effort is normal  No respiratory distress  Breath sounds: Normal breath sounds  No wheezing  Skin:     General: Skin is warm and dry  Neurological:      General: No focal deficit present  Mental Status: She is alert and oriented to person, place, and time     Psychiatric:         Mood and Affect: Mood normal          Behavior: Behavior normal      Pelvic - deferred today - see date of EMB

## 2021-01-27 ENCOUNTER — PATIENT MESSAGE (OUTPATIENT)
Dept: OBGYN CLINIC | Facility: CLINIC | Age: 51
End: 2021-01-27

## 2021-01-28 NOTE — TELEPHONE ENCOUNTER
Called and spoke with pt on mobile # in chart  Informed pt that blood work is available and ordered in the pts chart  She should go to [address] [place] [address] to have blood work drawn  Type/Screen, CBC w/P and HgA1c ordered  Pt states she will go to [facility] this weekend  Has appt for PAT review on Monday  Pt verbalized understanding

## 2021-01-30 ENCOUNTER — LAB (OUTPATIENT)
Dept: LAB | Facility: CLINIC | Age: 51
End: 2021-01-30
Payer: COMMERCIAL

## 2021-01-30 DIAGNOSIS — N93.9 ABNORMAL UTERINE BLEEDING: ICD-10-CM

## 2021-01-30 LAB
ERYTHROCYTE [DISTWIDTH] IN BLOOD BY AUTOMATED COUNT: 11.9 % (ref 11.6–15.1)
EST. AVERAGE GLUCOSE BLD GHB EST-MCNC: 97 MG/DL
HBA1C MFR BLD: 5 %
HCT VFR BLD AUTO: 44.1 % (ref 34.8–46.1)
HGB BLD-MCNC: 15 G/DL (ref 11.5–15.4)
MCH RBC QN AUTO: 30.6 PG (ref 26.8–34.3)
MCHC RBC AUTO-ENTMCNC: 34 G/DL (ref 31.4–37.4)
MCV RBC AUTO: 90 FL (ref 82–98)
PLATELET # BLD AUTO: 363 THOUSANDS/UL (ref 149–390)
PMV BLD AUTO: 10.9 FL (ref 8.9–12.7)
RBC # BLD AUTO: 4.9 MILLION/UL (ref 3.81–5.12)
WBC # BLD AUTO: 5.86 THOUSAND/UL (ref 4.31–10.16)

## 2021-01-30 PROCEDURE — 83036 HEMOGLOBIN GLYCOSYLATED A1C: CPT

## 2021-01-30 PROCEDURE — 85027 COMPLETE CBC AUTOMATED: CPT

## 2021-01-30 PROCEDURE — 86850 RBC ANTIBODY SCREEN: CPT

## 2021-01-30 PROCEDURE — 86900 BLOOD TYPING SEROLOGIC ABO: CPT

## 2021-01-30 PROCEDURE — 36415 COLL VENOUS BLD VENIPUNCTURE: CPT

## 2021-01-30 PROCEDURE — 86901 BLOOD TYPING SEROLOGIC RH(D): CPT

## 2021-02-01 ENCOUNTER — ANESTHESIA EVENT (OUTPATIENT)
Dept: PERIOP | Facility: HOSPITAL | Age: 51
End: 2021-02-01
Payer: COMMERCIAL

## 2021-02-01 LAB
ABO GROUP BLD: NORMAL
BLD GP AB SCN SERPL QL: NEGATIVE
RH BLD: POSITIVE
SPECIMEN EXPIRATION DATE: NORMAL

## 2021-02-01 NOTE — PRE-PROCEDURE INSTRUCTIONS
Pre-Surgery Instructions:   Medication Instructions    escitalopram (LEXAPRO) 20 mg tablet Instructed patient per Anesthesia Guidelines   Multiple Vitamins-Minerals (WOMENS MULTIVITAMIN PO) Patient was instructed by Physician and understands  Pre op and bathing instructions reviewed  Pt has hibiclens  Pt  Verbalized understanding of current visitor restrictions  Pt  Verbalized an understanding of all instructions reviewed and offers no concerns at this time  Instructed to avoid all ASA and OTC Vit/Supp 1 week prior to surgery and to avoid NSAIDs 3 days prior to surgery  Tylenol ok to take prn  Instructed to take per normal schedule except DOS  Reviewed Carb Drink Instructions per Surgeon/Anes  Guidelines

## 2021-02-07 NOTE — ANESTHESIA PREPROCEDURE EVALUATION
Procedure:  HYSTERECTOMY VAGINAL TOTAL (TVH) WITH BILATERAL SALPINGECTOMY (N/A Vagina )  CYSTOSCOPY (N/A Bladder)    Relevant Problems   No relevant active problems      Anxiety  Asthma no current issues       Physical Exam    Airway    Mallampati score: II  TM Distance: >3 FB  Neck ROM: full     Dental       Cardiovascular      Pulmonary      Other Findings        Anesthesia Plan  ASA Score- 2     Anesthesia Type- general with ASA Monitors  Additional Monitors:   Airway Plan: ETT  Comment: TAP Block if conversion to open abdominal procedure          Plan Factors-    Chart reviewed  Induction- intravenous  Postoperative Plan-     Informed Consent- Anesthetic plan and risks discussed with patient  I personally reviewed this patient with the CRNA  Discussed and agreed on the Anesthesia Plan with the CRNA  John Walker

## 2021-02-08 ENCOUNTER — ANESTHESIA (OUTPATIENT)
Dept: PERIOP | Facility: HOSPITAL | Age: 51
End: 2021-02-08
Payer: COMMERCIAL

## 2021-02-08 ENCOUNTER — HOSPITAL ENCOUNTER (OUTPATIENT)
Facility: HOSPITAL | Age: 51
Setting detail: OUTPATIENT SURGERY
Discharge: HOME | End: 2021-02-08
Attending: OBSTETRICS & GYNECOLOGY | Admitting: OBSTETRICS & GYNECOLOGY
Payer: COMMERCIAL

## 2021-02-08 VITALS
OXYGEN SATURATION: 96 % | TEMPERATURE: 97.1 F | BODY MASS INDEX: 34.71 KG/M2 | HEART RATE: 46 BPM | RESPIRATION RATE: 15 BRPM | HEIGHT: 68 IN | SYSTOLIC BLOOD PRESSURE: 105 MMHG | DIASTOLIC BLOOD PRESSURE: 64 MMHG | WEIGHT: 229 LBS

## 2021-02-08 VITALS — HEART RATE: 59 BPM

## 2021-02-08 DIAGNOSIS — G89.18 POSTOPERATIVE PAIN: Primary | ICD-10-CM

## 2021-02-08 DIAGNOSIS — N93.9 ABNORMAL UTERINE BLEEDING: ICD-10-CM

## 2021-02-08 LAB
EXT PREGNANCY TEST URINE: NEGATIVE
EXT. CONTROL: NORMAL

## 2021-02-08 PROCEDURE — 88307 TISSUE EXAM BY PATHOLOGIST: CPT | Performed by: PATHOLOGY

## 2021-02-08 PROCEDURE — 58552 LAPARO-VAG HYST INCL T/O: CPT | Performed by: OBSTETRICS & GYNECOLOGY

## 2021-02-08 PROCEDURE — 81025 URINE PREGNANCY TEST: CPT | Performed by: ANESTHESIOLOGY

## 2021-02-08 RX ORDER — SODIUM CHLORIDE, SODIUM LACTATE, POTASSIUM CHLORIDE, CALCIUM CHLORIDE 600; 310; 30; 20 MG/100ML; MG/100ML; MG/100ML; MG/100ML
50 INJECTION, SOLUTION INTRAVENOUS CONTINUOUS
Status: DISCONTINUED | OUTPATIENT
Start: 2021-02-08 | End: 2021-02-08 | Stop reason: HOSPADM

## 2021-02-08 RX ORDER — HYDROMORPHONE HCL/PF 1 MG/ML
0.4 SYRINGE (ML) INJECTION
Status: DISCONTINUED | OUTPATIENT
Start: 2021-02-08 | End: 2021-02-08 | Stop reason: HOSPADM

## 2021-02-08 RX ORDER — ONDANSETRON 2 MG/ML
4 INJECTION INTRAMUSCULAR; INTRAVENOUS ONCE AS NEEDED
Status: DISCONTINUED | OUTPATIENT
Start: 2021-02-08 | End: 2021-02-08 | Stop reason: HOSPADM

## 2021-02-08 RX ORDER — ACETAMINOPHEN 325 MG/1
975 TABLET ORAL ONCE
Status: COMPLETED | OUTPATIENT
Start: 2021-02-08 | End: 2021-02-08

## 2021-02-08 RX ORDER — ONDANSETRON 2 MG/ML
INJECTION INTRAMUSCULAR; INTRAVENOUS AS NEEDED
Status: DISCONTINUED | OUTPATIENT
Start: 2021-02-08 | End: 2021-02-08

## 2021-02-08 RX ORDER — PROPOFOL 10 MG/ML
INJECTION, EMULSION INTRAVENOUS AS NEEDED
Status: DISCONTINUED | OUTPATIENT
Start: 2021-02-08 | End: 2021-02-08

## 2021-02-08 RX ORDER — DEXAMETHASONE SODIUM PHOSPHATE 10 MG/ML
INJECTION, SOLUTION INTRAMUSCULAR; INTRAVENOUS AS NEEDED
Status: DISCONTINUED | OUTPATIENT
Start: 2021-02-08 | End: 2021-02-08

## 2021-02-08 RX ORDER — GLYCOPYRROLATE 0.2 MG/ML
INJECTION INTRAMUSCULAR; INTRAVENOUS AS NEEDED
Status: DISCONTINUED | OUTPATIENT
Start: 2021-02-08 | End: 2021-02-08

## 2021-02-08 RX ORDER — DIPHENHYDRAMINE HYDROCHLORIDE 50 MG/ML
12.5 INJECTION INTRAMUSCULAR; INTRAVENOUS ONCE AS NEEDED
Status: DISCONTINUED | OUTPATIENT
Start: 2021-02-08 | End: 2021-02-08 | Stop reason: HOSPADM

## 2021-02-08 RX ORDER — GABAPENTIN 100 MG/1
100 CAPSULE ORAL ONCE
Status: COMPLETED | OUTPATIENT
Start: 2021-02-08 | End: 2021-02-08

## 2021-02-08 RX ORDER — SODIUM CHLORIDE, SODIUM LACTATE, POTASSIUM CHLORIDE, CALCIUM CHLORIDE 600; 310; 30; 20 MG/100ML; MG/100ML; MG/100ML; MG/100ML
125 INJECTION, SOLUTION INTRAVENOUS CONTINUOUS
Status: DISCONTINUED | OUTPATIENT
Start: 2021-02-08 | End: 2021-02-08 | Stop reason: HOSPADM

## 2021-02-08 RX ORDER — KETOROLAC TROMETHAMINE 30 MG/ML
INJECTION, SOLUTION INTRAMUSCULAR; INTRAVENOUS AS NEEDED
Status: DISCONTINUED | OUTPATIENT
Start: 2021-02-08 | End: 2021-02-08

## 2021-02-08 RX ORDER — MAGNESIUM HYDROXIDE 1200 MG/15ML
LIQUID ORAL AS NEEDED
Status: DISCONTINUED | OUTPATIENT
Start: 2021-02-08 | End: 2021-02-08 | Stop reason: HOSPADM

## 2021-02-08 RX ORDER — EPHEDRINE SULFATE 50 MG/ML
INJECTION INTRAVENOUS AS NEEDED
Status: DISCONTINUED | OUTPATIENT
Start: 2021-02-08 | End: 2021-02-08

## 2021-02-08 RX ORDER — IBUPROFEN 600 MG/1
600 TABLET ORAL EVERY 6 HOURS PRN
Qty: 30 TABLET | Refills: 0 | Status: CANCELLED
Start: 2021-02-08

## 2021-02-08 RX ORDER — FENTANYL CITRATE/PF 50 MCG/ML
50 SYRINGE (ML) INJECTION
Status: DISCONTINUED | OUTPATIENT
Start: 2021-02-08 | End: 2021-02-08 | Stop reason: HOSPADM

## 2021-02-08 RX ORDER — BUPIVACAINE HYDROCHLORIDE 2.5 MG/ML
INJECTION, SOLUTION EPIDURAL; INFILTRATION; INTRACAUDAL AS NEEDED
Status: DISCONTINUED | OUTPATIENT
Start: 2021-02-08 | End: 2021-02-08 | Stop reason: HOSPADM

## 2021-02-08 RX ORDER — OXYCODONE HYDROCHLORIDE 5 MG/1
5 TABLET ORAL EVERY 4 HOURS PRN
Status: DISCONTINUED | OUTPATIENT
Start: 2021-02-08 | End: 2021-02-08 | Stop reason: HOSPADM

## 2021-02-08 RX ORDER — ROCURONIUM BROMIDE 10 MG/ML
INJECTION, SOLUTION INTRAVENOUS AS NEEDED
Status: DISCONTINUED | OUTPATIENT
Start: 2021-02-08 | End: 2021-02-08

## 2021-02-08 RX ORDER — LIDOCAINE HYDROCHLORIDE 20 MG/ML
INJECTION, SOLUTION EPIDURAL; INFILTRATION; INTRACAUDAL; PERINEURAL AS NEEDED
Status: DISCONTINUED | OUTPATIENT
Start: 2021-02-08 | End: 2021-02-08

## 2021-02-08 RX ORDER — OXYCODONE HYDROCHLORIDE 5 MG/1
10 TABLET ORAL EVERY 4 HOURS PRN
Status: DISCONTINUED | OUTPATIENT
Start: 2021-02-08 | End: 2021-02-08 | Stop reason: HOSPADM

## 2021-02-08 RX ORDER — SODIUM CHLORIDE 9 MG/ML
INJECTION, SOLUTION INTRAVENOUS AS NEEDED
Status: DISCONTINUED | OUTPATIENT
Start: 2021-02-08 | End: 2021-02-08 | Stop reason: HOSPADM

## 2021-02-08 RX ORDER — ACETAMINOPHEN 500 MG
500 TABLET ORAL EVERY 6 HOURS PRN
Refills: 0 | Status: CANCELLED
Start: 2021-02-08

## 2021-02-08 RX ORDER — MIDAZOLAM HYDROCHLORIDE 2 MG/2ML
INJECTION, SOLUTION INTRAMUSCULAR; INTRAVENOUS AS NEEDED
Status: DISCONTINUED | OUTPATIENT
Start: 2021-02-08 | End: 2021-02-08

## 2021-02-08 RX ORDER — FENTANYL CITRATE 50 UG/ML
INJECTION, SOLUTION INTRAMUSCULAR; INTRAVENOUS AS NEEDED
Status: DISCONTINUED | OUTPATIENT
Start: 2021-02-08 | End: 2021-02-08

## 2021-02-08 RX ORDER — CELECOXIB 100 MG/1
200 CAPSULE ORAL ONCE
Status: COMPLETED | OUTPATIENT
Start: 2021-02-08 | End: 2021-02-08

## 2021-02-08 RX ORDER — ACETAMINOPHEN 325 MG/1
650 TABLET ORAL EVERY 6 HOURS PRN
Status: DISCONTINUED | OUTPATIENT
Start: 2021-02-08 | End: 2021-02-08 | Stop reason: HOSPADM

## 2021-02-08 RX ORDER — PHENAZOPYRIDINE HYDROCHLORIDE 100 MG/1
100 TABLET, FILM COATED ORAL ONCE
Status: COMPLETED | OUTPATIENT
Start: 2021-02-08 | End: 2021-02-08

## 2021-02-08 RX ORDER — IBUPROFEN 600 MG/1
600 TABLET ORAL EVERY 6 HOURS PRN
Status: DISCONTINUED | OUTPATIENT
Start: 2021-02-08 | End: 2021-02-08 | Stop reason: HOSPADM

## 2021-02-08 RX ORDER — CEFAZOLIN SODIUM 2 G/50ML
2000 SOLUTION INTRAVENOUS ONCE
Status: COMPLETED | OUTPATIENT
Start: 2021-02-08 | End: 2021-02-08

## 2021-02-08 RX ORDER — NEOSTIGMINE METHYLSULFATE 1 MG/ML
INJECTION INTRAVENOUS AS NEEDED
Status: DISCONTINUED | OUTPATIENT
Start: 2021-02-08 | End: 2021-02-08

## 2021-02-08 RX ORDER — OXYCODONE HYDROCHLORIDE 5 MG/1
5 TABLET ORAL EVERY 4 HOURS PRN
Qty: 10 TABLET | Refills: 0 | Status: SHIPPED | OUTPATIENT
Start: 2021-02-08 | End: 2021-02-18

## 2021-02-08 RX ADMIN — GABAPENTIN 100 MG: 100 CAPSULE ORAL at 08:53

## 2021-02-08 RX ADMIN — PHENAZOPYRIDINE 100 MG: 100 TABLET ORAL at 08:53

## 2021-02-08 RX ADMIN — ROCURONIUM BROMIDE 35 MG: 10 INJECTION, SOLUTION INTRAVENOUS at 09:57

## 2021-02-08 RX ADMIN — GLYCOPYRROLATE 0.4 MG: 0.2 INJECTION, SOLUTION INTRAMUSCULAR; INTRAVENOUS at 11:52

## 2021-02-08 RX ADMIN — ROCURONIUM BROMIDE 10 MG: 10 INJECTION, SOLUTION INTRAVENOUS at 11:11

## 2021-02-08 RX ADMIN — GLYCOPYRROLATE 0.2 MG: 0.2 INJECTION, SOLUTION INTRAMUSCULAR; INTRAVENOUS at 10:24

## 2021-02-08 RX ADMIN — DEXAMETHASONE SODIUM PHOSPHATE 4 MG: 10 INJECTION, SOLUTION INTRAMUSCULAR; INTRAVENOUS at 09:52

## 2021-02-08 RX ADMIN — LIDOCAINE HYDROCHLORIDE 100 MG: 20 INJECTION, SOLUTION EPIDURAL; INFILTRATION; INTRACAUDAL at 09:52

## 2021-02-08 RX ADMIN — FENTANYL CITRATE 50 MCG: 50 INJECTION, SOLUTION INTRAMUSCULAR; INTRAVENOUS at 09:52

## 2021-02-08 RX ADMIN — FENTANYL CITRATE 50 MCG: 50 INJECTION, SOLUTION INTRAMUSCULAR; INTRAVENOUS at 10:17

## 2021-02-08 RX ADMIN — NEOSTIGMINE METHYLSULFATE 3 MG: 1 INJECTION INTRAVENOUS at 11:52

## 2021-02-08 RX ADMIN — PROPOFOL 200 MG: 10 INJECTION, EMULSION INTRAVENOUS at 09:52

## 2021-02-08 RX ADMIN — FENTANYL CITRATE 50 MCG: 50 INJECTION INTRAMUSCULAR; INTRAVENOUS at 12:31

## 2021-02-08 RX ADMIN — EPHEDRINE SULFATE 5 MG: 50 INJECTION, SOLUTION INTRAVENOUS at 10:25

## 2021-02-08 RX ADMIN — CEFAZOLIN SODIUM 2000 MG: 2 SOLUTION INTRAVENOUS at 09:41

## 2021-02-08 RX ADMIN — ONDANSETRON 4 MG: 2 INJECTION INTRAMUSCULAR; INTRAVENOUS at 09:52

## 2021-02-08 RX ADMIN — ROCURONIUM BROMIDE 10 MG: 10 INJECTION, SOLUTION INTRAVENOUS at 10:47

## 2021-02-08 RX ADMIN — SODIUM CHLORIDE, SODIUM LACTATE, POTASSIUM CHLORIDE, AND CALCIUM CHLORIDE: .6; .31; .03; .02 INJECTION, SOLUTION INTRAVENOUS at 10:56

## 2021-02-08 RX ADMIN — ACETAMINOPHEN 975 MG: 325 TABLET, FILM COATED ORAL at 08:53

## 2021-02-08 RX ADMIN — MIDAZOLAM HYDROCHLORIDE 2 MG: 1 INJECTION, SOLUTION INTRAMUSCULAR; INTRAVENOUS at 09:47

## 2021-02-08 RX ADMIN — KETOROLAC TROMETHAMINE 30 MG: 30 INJECTION, SOLUTION INTRAMUSCULAR at 11:26

## 2021-02-08 RX ADMIN — CELECOXIB 200 MG: 100 CAPSULE ORAL at 08:53

## 2021-02-08 RX ADMIN — SODIUM CHLORIDE, SODIUM LACTATE, POTASSIUM CHLORIDE, AND CALCIUM CHLORIDE: .6; .31; .03; .02 INJECTION, SOLUTION INTRAVENOUS at 09:41

## 2021-02-08 RX ADMIN — IBUPROFEN 600 MG: 600 TABLET, FILM COATED ORAL at 13:20

## 2021-02-08 RX ADMIN — ROCURONIUM BROMIDE 5 MG: 10 INJECTION, SOLUTION INTRAVENOUS at 09:52

## 2021-02-08 NOTE — ANESTHESIA POSTPROCEDURE EVALUATION
Post-Op Assessment Note    CV Status:  Stable  Pain Score: 0    Pain management: adequate     Mental Status:  Awake and alert   Hydration Status:  Stable   PONV Controlled:  None   Airway Patency:  Patent and adequate      Post Op Vitals Reviewed: Yes      Staff: CRNA, Anesthesiologist         No complications documented      BP   122/60   Temp  97   Pulse  47   Resp   16   SpO2   99%

## 2021-02-08 NOTE — OP NOTE
OPERATIVE REPORT  PATIENT NAME: Felipe Mg    :  1970  MRN: [id number]  Pt Location: AN OR ROOM 03    SURGERY DATE: 2021    Surgeon(s) and Role:     * Jerri East MD - Primary     * Zahraa Bernal MD - Assisting    Preop Diagnosis:  Abnormal uterine bleeding [N93 9]    Post-Op Diagnosis Codes:     * Abnormal uterine bleeding [N93 9]    Procedure(s) (LRB):  HYSTERECTOMY VAGINAL TOTAL (TVH) WITH BILATERAL SALPINGECTOMY (N/A)  CYSTOSCOPY (N/A)  LAPAROSCOPY DIAGNOSTIC (N/A)  OOPHORECTOMY, LAPAROSCOPIC (Left)    Specimen(s):  ID Type Source Tests Collected by Time Destination   1 : Uterus, cervix, bilateral fallopian tubes, LEFT ovary Tissue Uterus TISSUE EXAM Kandy Prajapati MD 2021 1020        Estimated Blood Loss:   300 mL    Drains:  [REMOVED] Urethral Catheter Non-latex 16 Fr  (Removed)   Number of days: 0       Anesthesia Type:   General endotracheal    Operative Indications:  Abnormal uterine bleeding [N93 9]    Operative Findings:  1  On bimanual exam an enlarged anteverted uterus was palpated with no adnexal masses or fullness appreciated  2  On speculum exam a grossly wall and cervix were visualized with good descensus noted-there was a grade 2 cystocele and rectocele that were seen  3  Following removal of uterus a 4 cm posterior fundal fibroid was noted  There were grossly normal bilateral fallopian tubes and ovaries are visualized  4  Torn left pedicle necessitating diagnostic laparoscopy- On laparoscopy the uto-ovarian/ round ligament torn was noted to having continuous oozing and decision was made to perform left oophorectomy for hemostasis  Grossly normal appearing right ovary  5  On cystoscopy the bladder appeared grossly normal with no foregn material or suture identified, a bubble was seen at the bladder dome and     Complications:    Torn left pedicle requiring diagnostic laparoscopy and subsequent left oophorectomy for hemostasis    Procedure and Technique:  The patient was correctly identified in the holding area and taken to the operating room where she was intubated and general anesthesia was administered without difficulty  She had pneumatic compression boots placed  Antibiotics were given for surgical prophylaxis  She was placed in a dorsal lithotomy position using Norberto stirrups  Correct positioning and padding all pressure points were confirmed  A time-out was performed for procedure  Her perineum and vagina was prepped using chlorhexidine solution  She was draped in a normal sterile fashion  A Jiménez catheter was aseptically placed and kept indwelling for the procedure  A weighted speculum was placed into the vagina  The cervix was grasped with a double-toothed tenaculum anteriorly and posteriorly  The cervix was injected with 0 25 Marcaine for hemostasis at the cervico-vaginal junction circumferentially  The cervix was then circumferentially incised in the cervicovaginal  junction using electrocautery with a Bovie  The bladder was dissected off the pubocervical fascia anteriorly by sharp and blunt dissection with a sponge and posteriorly the posterior cul-de-sac was lifted off the vaginal mucosa and the posterior cul-de-sac was entered sharply without difficulty  The posterior vaginal cuff was tagged with a single stitch of 0 Vicryl  A gooseneck long weighted speculum was then inserted posteriorly  The Enseal X1 was used to ligate and transect the uterosacral ligaments on both sides  Hemostasis was assured  The cardinal ligaments including the uterine arteries and broad ligament were ligated and transected with the Enseal X1  Excellent hemostasis was applied  The anterior cul-de-sac was entered with a Metzenbaum scissors  The uterus was delivered with towel clamps posteriorly and the bilateral fallopian tubes and ovaries were visualized and the fallopian tubes were grasped with babcocks and ligated and removed with Enseal X1   The left cornua and tuboovarian ligament was attempted to be clamped with Zoila and tore off without appropriate suture ligation  Moderate bleeding was noted and decision was made to continue with hysterectomy and cuff closure with diagnostic laparoscopy following to directly visualize the pedicle  The right cornua and tuboovarian ligament were clamped with Zoila clamp and was ligated and transected with Enseal X1  The uterus was then delivered and sent for pathology  The vagina was then closed using a figure-of-eight stitches at the angles and a running locked suture using 0-vicryl for remainder of cuff  Good hemostasis was noted at the time and decision was made to turn to abdomen for diagnostic laparoscopy  All instruments were then removed from the patient's vagina  A 5 mm incision was made at the inferior edge of the umbilicus for introduction of a 5mm trocar  Trocar was introduced under direct visualization  Pneumoperitoneum was then established to a maximum of 15mmHg  The entire abdomen and pelvis was inspected and there was no evidence of injury to bowel, bladder, vasculature, or other structures  Attention was then turned to the pelvis  Patient was placed in Trendelenburg  Two additional port sites were selected in the left and right lower abdomen approximately 2cm superior and medial to the iliac crests  A 5mm incision was made for introduction of a 5mm trocar under direct visualization at each site  A blunt grasper was inserted through this port and used to visualize the left lost pedicle  The bleeding edges were attempted to be sealed using an enseal device but continuous oozing lead to the decision to complete a left oophorectomy  The left IP ligament was cauterized and transected and excellent hemostasis was noted thereafter  The remaining right fallopian tube was grasped and elevated to visualize the mesosalpinx   The Enseal device was used to ligate along the mesosalpinx and amputating the remaining tube  All specimens were placed in endocatch bag and removed from the left protsite without difficulty  Attention was turned to the cuff and bleeding was noted at peritoneal edge  This edge was sealed with Enseal device  Surgiflo was then placed atop the raw edges of cuff for added hemostasis  Adequate hemostasis was visualized  The middle trocar was observed and noted to have appropriate placement and followed by removing  inferior trocars under direct visualization  The laparoscope was withdrawn from the abdomen, followed by its trocar sleeve at the umbilicus  Skin incisions were closed with running absorbable suture of 4-0 monocryl  The obando catheter was noted to be draining clear urine  The Obando catheter was removed and cystoscopy was performed  The above findings were noted  Attention was then turned to the vaginal cuff with continued vaginal bleeding and an additional figure of 8 suture was placed midline with 0-vicryl  Excellent hemostasis was noted thereafter  Sponge, instrument, and needle count were correct x2  Patient was transferred to PACU extubated in stable condition with plans for discharge home  Patient instructed to follow-up in office in 1-2 weeks for her postoperative care  Dr Krystle Merlos was present and scrubbed for the entire case      Patient Disposition:  PACU     SIGNATURE: Matthew Toro MD  DATE: February 8, 2021  TIME: 12:32 PM

## 2021-02-08 NOTE — INTERVAL H&P NOTE
H&P reviewed  After examining the patient I find no changes in the patients condition since the H&P had been written      Vitals:    02/08/21 0846   BP: 128/71   Pulse: 68   Resp: 18   Temp: (!) 97 2 °F (36 2 °C)   SpO2: 96%

## 2021-02-08 NOTE — DISCHARGE INSTRUCTIONS
Vaginal Hysterectomy   WHAT YOU NEED TO KNOW:   A vaginal hysterectomy is surgery to remove your uterus through your vagina  Other organs, such as your ovaries and fallopian tubes, may also have been removed  You will have pain and vaginal bleeding for the first few days after surgery  DISCHARGE INSTRUCTIONS:   Call your local emergency number (911 in the [address] [facility]) for any of the following:   · You feel lightheaded, short of breath, and have chest pain  · You cough up blood  Contact your healthcare provider or gynecologist if:   · Your arm or leg feels warm, tender, and painful  It may look swollen and red  · You have increasing abdominal or pelvic pain  · You have heavy vaginal bleeding that fills 1 or more sanitary pads in 1 hour  · You have a fever  · You have nausea or are vomiting  · You feel pain or burning when you urinate, or you have trouble urinating  · You have pus or a foul-smelling odor coming from your vagina  · You feel pressure in your rectum  · You have questions or concerns about your condition or care  Medicines:   · Prescription pain medicine  may be given  Ask your healthcare provider how to take this medicine safely  Some prescription pain medicines contain acetaminophen  Do not take other medicines that contain acetaminophen without talking to your healthcare provider  Too much acetaminophen may cause liver damage  Prescription pain medicine may cause constipation  Ask your healthcare provider how to prevent or treat constipation  · Stool softeners  help treat or prevent constipation  · Take your medicine as directed  Contact your healthcare provider if you think your medicine is not helping or if you have side effects  Tell him or her if you are allergic to any medicine  Keep a list of the medicines, vitamins, and herbs you take  Include the amounts, and when and why you take them  Bring the list or the pill bottles to follow-up visits   Carry your medicine list with you in case of an emergency  Activity:   · Wear an abdominal binder as directed  An abdominal binder will decrease pain when you move or cough  · Rest as needed  Get up and move around as directed to help prevent blood clots  Start with short walks and slowly increase the distance every day  Limit the number of times you climb stairs to 2 times each day  Plan most of your daily activities on one level of your home  · Do not lift objects heavier than 10 pounds for 6 weeks  Avoid strenuous activity for 2 weeks  · Do not strain during bowel movements  High-fiber foods and extra liquids can help you prevent constipation  Examples of high-fiber foods are fruit and bran  Prune juice and water are good liquids to drink  · Do not have sex, use tampons, or douche for up to 8 weeks  Ask your healthcare provider if it is okay to take a tub bath  · Do not go in pools or hot tubs for 6 weeks or as directed  · Ask when it is safe for you to drive, return to work, and return to other regular activities  Deep breathing:  Take deep breaths and cough 10 times each hour  This will decrease your risk for a lung infection  Take a deep breath and hold it for as long as you can  Let the air out and then cough strongly  Deep breaths help open your airway  You may be given an incentive spirometer to help you take deep breaths  Put the plastic piece in your mouth and take a slow, deep breath, then let the air out and cough  Repeat these steps 10 times every hour  Get support: This surgery may be life-changing for you and your family  You will no longer be able to get pregnant  Sudden changes in the levels of your hormones may occur and cause mood swings and depression  You may feel angry, sad, or frightened, or cry frequently and unexpectedly  These feelings are normal  Talk to your healthcare provider about where you can get support   You can also ask if hormone replacement medicine is right for you  Follow up with your healthcare provider or gynecologist as directed: You may need to return for other tests  Write down your questions so you remember to ask them during your visits  © Copyright [address] Information is for End User's use only and may not be sold, redistributed or otherwise used for commercial purposes  All illustrations and images included in CareNotes® are the copyrighted property of A D A M , Inc  or [facility] Tony Rodriguez   The above information is an  only  It is not intended as medical advice for individual conditions or treatments  Talk to your doctor, nurse or pharmacist before following any medical regimen to see if it is safe and effective for you

## 2021-02-19 ENCOUNTER — OFFICE VISIT (OUTPATIENT)
Dept: OBGYN CLINIC | Facility: CLINIC | Age: 51
End: 2021-02-19

## 2021-02-19 VITALS — DIASTOLIC BLOOD PRESSURE: 72 MMHG | SYSTOLIC BLOOD PRESSURE: 124 MMHG

## 2021-02-19 DIAGNOSIS — Z09 POSTOPERATIVE EXAMINATION: Primary | ICD-10-CM

## 2021-02-19 DIAGNOSIS — Z90.710 H/O TOTAL VAGINAL HYSTERECTOMY: ICD-10-CM

## 2021-02-19 DIAGNOSIS — Z90.721 S/P LEFT OOPHORECTOMY: ICD-10-CM

## 2021-02-19 PROBLEM — Z98.890 HISTORY OF ENDOMETRIAL ABLATION: Status: RESOLVED | Noted: 2020-10-12 | Resolved: 2021-02-19

## 2021-02-19 PROBLEM — N92.0 MENORRHAGIA WITH REGULAR CYCLE: Status: RESOLVED | Noted: 2020-02-07 | Resolved: 2021-02-19

## 2021-02-19 PROBLEM — N93.9 ABNORMAL UTERINE BLEEDING (AUB): Status: RESOLVED | Noted: 2020-10-12 | Resolved: 2021-02-19

## 2021-02-19 PROCEDURE — 99024 POSTOP FOLLOW-UP VISIT: CPT | Performed by: OBSTETRICS & GYNECOLOGY

## 2021-02-19 NOTE — PROGRESS NOTES
Subjective     Alexa Cooney is a 53 y o  female who presents to the clinic 2 weeks status post vaginal hysterectomy, bilateral salpingectomy, left oophorectomy, diagnostic laparoscopy, cystoscopy for abnormal uterine bleeding  Eating a regular diet without difficulty  Bowel movements are normal  The patient is not having any pain  She denies vaginal bleeding and discharge    The following portions of the patient's history were reviewed and updated as appropriate: allergies, current medications, past family history, past medical history, past social history, past surgical history and problem list     Review of Systems  Pertinent items are noted in HPI  Objective     /72 (BP Location: Right arm, Patient Position: Sitting, Cuff Size: Standard)   LMP 12/27/2020 (Exact Date)   General:  alert and oriented, in no acute distress   Abdomen: soft, non-tender   Incision:   healing well, no drainage, no erythema, no hernia, no seroma, no swelling, no dehiscence, incision well approximated     Vaginal cuff - intact, healing well    Assessment      Doing well postoperatively  Operative findings again reviewed  Pathology report discussed  Plan     1  Continue any current medications  2  Wound care discussed  3  Activity restrictions: no lifting more than 10 pounds, nothing in the vaginal   4  Anticipated return to work: next week virtually  5  Follow up: 4 weeks for routine postop visit

## 2021-03-20 DIAGNOSIS — F32.A DEPRESSION, UNSPECIFIED DEPRESSION TYPE: ICD-10-CM

## 2021-03-22 ENCOUNTER — TELEPHONE (OUTPATIENT)
Dept: FAMILY MEDICINE CLINIC | Facility: CLINIC | Age: 51
End: 2021-03-22

## 2021-03-22 DIAGNOSIS — F32.A DEPRESSION, UNSPECIFIED DEPRESSION TYPE: ICD-10-CM

## 2021-03-22 RX ORDER — ESCITALOPRAM OXALATE 20 MG/1
TABLET ORAL
Qty: 45 TABLET | Refills: 2 | Status: SHIPPED | OUTPATIENT
Start: 2021-03-22 | End: 2021-03-22 | Stop reason: SDUPTHER

## 2021-03-22 RX ORDER — ESCITALOPRAM OXALATE 20 MG/1
20 TABLET ORAL DAILY
Qty: 30 TABLET | Refills: 2 | Status: SHIPPED | OUTPATIENT
Start: 2021-03-22 | End: 2021-08-09 | Stop reason: SDUPTHER

## 2021-03-22 NOTE — TELEPHONE ENCOUNTER
Spoke with pharmacy and when they changed dosage to one tab daily the refill went through and made patient aware - please resend as 1 tab daily to [facility] pharmacy

## 2021-03-22 NOTE — TELEPHONE ENCOUNTER
----- Message from Alayna Bass, [address] [place] sent at 3/22/2021 10:27 AM EDT -----  Regarding: FW: Prescription Question  Contact: [phone]    ----- Message -----  From: Miguelangel Abreu  Sent: 3/22/2021   9:44 AM EDT  To: [facility]  Subject: Prescription Question                            Dr Roshni Hamlin,   [facility] is saying they cannot refill my generic Lexapro until April 7th, but that doesn't fit with dosing  Especially because the RX is for 1 5 pills and I've only been taking 1 pill daily  Can you assist with getting this refilled before the 7th  I only have 3 pills left  They told me to contact my insurance company, but I'm not sure how to do that or if they will take my word for it, rather than a doctor  I really don't want to run out    Thank you so much, Missy Luevano

## 2021-03-23 ENCOUNTER — OFFICE VISIT (OUTPATIENT)
Dept: OBGYN CLINIC | Facility: CLINIC | Age: 51
End: 2021-03-23

## 2021-03-23 VITALS — WEIGHT: 215 LBS | DIASTOLIC BLOOD PRESSURE: 72 MMHG | BODY MASS INDEX: 32.69 KG/M2 | SYSTOLIC BLOOD PRESSURE: 118 MMHG

## 2021-03-23 DIAGNOSIS — Z09 POSTOPERATIVE EXAMINATION: Primary | ICD-10-CM

## 2021-03-23 PROCEDURE — 99024 POSTOP FOLLOW-UP VISIT: CPT | Performed by: OBSTETRICS & GYNECOLOGY

## 2021-03-23 NOTE — PROGRESS NOTES
Elva Dolan is a 53 y o  female who presents to the clinic 6 weeks status post TLH, BS, dx lap, cystoscopy for abnormal uterine bleeding  Eating a regular diet without difficulty  Bowel movements are normal  The patient is not having any pain  She denies vaginal bleeding and discharge  She has return of energy and has been working from home    The following portions of the patient's history were reviewed and updated as appropriate: allergies, current medications, past family history, past medical history, past social history, past surgical history and problem list     Review of Systems  Pertinent items are noted in HPI  Objective     /72   Wt 97 5 kg (215 lb)   LMP 12/27/2020 (Exact Date)   Breastfeeding No   BMI 32 69 kg/m²   General:  alert and oriented, in no acute distress   Abdomen: soft, non-tender   Incision:   healing well, no drainage, no erythema, no hernia, no seroma, no swelling, no dehiscence, incision well approximated     Vaginal cuff : well healed    Assessment      Doing well postoperatively  Operative findings again reviewed  Pathology report discussed  Plan     1  Continue any current medications  2  Wound care discussed  3  Activity restrictions: none  4  Anticipated return to work: now    5  Follow up: 1 year for annual

## 2021-03-26 DIAGNOSIS — Z23 ENCOUNTER FOR IMMUNIZATION: ICD-10-CM

## 2021-04-09 ENCOUNTER — TELEPHONE (OUTPATIENT)
Dept: FAMILY MEDICINE CLINIC | Facility: CLINIC | Age: 51
End: 2021-04-09

## 2021-08-09 DIAGNOSIS — F32.A DEPRESSION, UNSPECIFIED DEPRESSION TYPE: ICD-10-CM

## 2021-08-09 RX ORDER — ESCITALOPRAM OXALATE 20 MG/1
20 TABLET ORAL DAILY
Qty: 90 TABLET | Refills: 0 | Status: SHIPPED | OUTPATIENT
Start: 2021-08-09 | End: 2022-02-11

## 2021-09-10 ENCOUNTER — OFFICE VISIT (OUTPATIENT)
Dept: FAMILY MEDICINE CLINIC | Facility: CLINIC | Age: 51
End: 2021-09-10
Payer: COMMERCIAL

## 2021-09-10 VITALS
BODY MASS INDEX: 33.34 KG/M2 | OXYGEN SATURATION: 97 % | SYSTOLIC BLOOD PRESSURE: 120 MMHG | DIASTOLIC BLOOD PRESSURE: 78 MMHG | HEART RATE: 84 BPM | TEMPERATURE: 97.6 F | RESPIRATION RATE: 18 BRPM | WEIGHT: 220 LBS | HEIGHT: 68 IN

## 2021-09-10 DIAGNOSIS — G47.00 INSOMNIA, UNSPECIFIED TYPE: ICD-10-CM

## 2021-09-10 DIAGNOSIS — R53.83 OTHER FATIGUE: ICD-10-CM

## 2021-09-10 DIAGNOSIS — H69.83 EUSTACHIAN TUBE DYSFUNCTION, BILATERAL: Primary | ICD-10-CM

## 2021-09-10 DIAGNOSIS — E78.00 HYPERCHOLESTEROLEMIA: ICD-10-CM

## 2021-09-10 DIAGNOSIS — F32.5 MAJOR DEPRESSIVE DISORDER WITH SINGLE EPISODE, IN FULL REMISSION (HCC): ICD-10-CM

## 2021-09-10 PROCEDURE — 3725F SCREEN DEPRESSION PERFORMED: CPT | Performed by: FAMILY MEDICINE

## 2021-09-10 PROCEDURE — 3008F BODY MASS INDEX DOCD: CPT | Performed by: FAMILY MEDICINE

## 2021-09-10 PROCEDURE — 1036F TOBACCO NON-USER: CPT | Performed by: FAMILY MEDICINE

## 2021-09-10 PROCEDURE — 99214 OFFICE O/P EST MOD 30 MIN: CPT | Performed by: FAMILY MEDICINE

## 2021-09-10 RX ORDER — PREDNISONE 10 MG/1
TABLET ORAL
Qty: 26 TABLET | Refills: 0 | Status: SHIPPED | OUTPATIENT
Start: 2021-09-10 | End: 2021-10-21 | Stop reason: ALTCHOICE

## 2021-09-10 RX ORDER — ZOLPIDEM TARTRATE 12.5 MG/1
12.5 TABLET, FILM COATED, EXTENDED RELEASE ORAL
Qty: 30 TABLET | Refills: 0 | Status: SHIPPED | OUTPATIENT
Start: 2021-09-10 | End: 2021-10-21 | Stop reason: ALTCHOICE

## 2021-09-10 NOTE — PROGRESS NOTES
BMI Counseling: Body mass index is 33 45 kg/m²  The BMI is above normal  Nutrition recommendations include reducing portion sizes, decreasing overall calorie intake and moderation in carbohydrate intake  Patient ID: Makayla Cano is a 49 y o  female  HPI: 49 y  o female presenting with symptoms of ear pressure, crackling of ears and dizziness associated with positional changes  Symptoms for past 2 weeks, worsening  Pt also has been unable to stay asleep and wakes up frequently at night and therefore has daytime fatigue  She also needs her cholesterol checked  Her depression is stable at  this time        SUBJECTIVE    Family History   Problem Relation Age of Onset   Elizabeth Alberto Cancer Mother     Hypothyroidism Mother     Lung cancer Mother 72    Heart disease Father     Venous thrombosis Father         of Deep Vessels of Lower Extremity    Aortic aneurysm Father     Breast cancer Paternal Grandmother 61    Hypertension Paternal Grandfather     Breast cancer Maternal Aunt 48    No Known Problems Maternal Aunt     No Known Problems Maternal Aunt     Breast cancer Paternal Aunt 48    Breast cancer Paternal Aunt 48    No Known Problems Paternal Aunt     No Known Problems Paternal [de-identified]     Esophageal cancer Paternal Uncle 61     Social History     Socioeconomic History    Marital status: /Civil Union     Spouse name: Not on file    Number of children: Not on file    Years of education: Not on file    Highest education level: Not on file   Occupational History    Not on file   Tobacco Use    Smoking status: Former Smoker     Packs/day: 1 00     Years: 0 00     Pack years: 0 00    Smokeless tobacco: Former User     Quit date: 2003   Vaping Use    Vaping Use: Never used   Substance and Sexual Activity    Alcohol use: Yes     Comment: socially    Drug use: Never    Sexual activity: Yes     Partners: Male     Birth control/protection: None, Surgical, Spermicide, Post-menopausal   Other Topics Concern    Not on file   Social History Narrative    Daily coffee consumption , 2 cups/day    Exercise regularly     Social Determinants of Health     Financial Resource Strain:     Difficulty of Paying Living Expenses:    Food Insecurity:     Worried About Running Out of Food in the Last Year:     [address] in the Last Year:    Transportation Needs:     Lack of Transportation (Medical):  Lack of Transportation (Non-Medical):    Physical Activity:     Days of Exercise per Week:     Minutes of Exercise per Session:    Stress:     Feeling of Stress :    Social Connections:     Frequency of Communication with Friends and Family:     Frequency of Social Gatherings with Friends and Family:     Attends Druze Services:     Active Member of Clubs or Organizations:     Attends Club or Organization Meetings:     Marital Status:    Intimate Partner Violence:     Fear of Current or Ex-Partner:     Emotionally Abused:     Physically Abused:     Sexually Abused:      Past Medical History:   Diagnosis Date    Anxiety     Asthma     no current issues    Colitis     Dysfunctional uterine bleeding      Past Surgical History:   Procedure Laterality Date    DILATION AND CURETTAGE OF UTERUS      DILATION AND CURETTAGE OF UTERUS N/A 2017    Procedure: DILATATION AND CURETTAGE (D&C);   Surgeon: Darling Martinez MD;  [facility];  Service:     ENDOMETRIAL ABLATION N/A 2017    Procedure: Sariah Mckeon;  Surgeon: Darling Martinez MD;  [facility];  Service:     INDUCED       By Dilation and Evacuation    OOPHORECTOMY Left 2021    Procedure: Harshad Nino;  Surgeon: Patrick Bernstein MD;  [facility];  Service: Gynecology    [place] CYSTOURETHROSCOPY N/A 2021    Procedure: Ac Briones;  Surgeon: Patrick Bernstein MD;  [facility];  Service: Gynecology    [place] LAP,DIAGNOSTIC ABDOMEN N/A 2021    Procedure: Lindsay Rogers DIAGNOSTIC;  Surgeon: Mic Jarrell MD;  [facility];  Service: Gynecology    UT VAG HYST,RMV TUBE/OVARY N/A 2/8/2021    Procedure: HYSTERECTOMY VAGINAL TOTAL (TVH) WITH BILATERAL SALPINGECTOMY;  Surgeon: Mic Jarrell MD;  [facility];  Service: Gynecology     No Known Allergies    Current Outpatient Medications:     escitalopram (LEXAPRO) 20 mg tablet, Take 1 tablet (20 mg total) by mouth daily, Disp: 90 tablet, Rfl: 0    Multiple Vitamins-Minerals (WOMENS MULTIVITAMIN PO), Take by mouth, Disp: , Rfl:     predniSONE 10 mg tablet, 3 tabs po bid x2 days, then 2 tabs po bid x2 days, then 1 tab bid x2 days, then 1 daily until done , Disp: 26 tablet, Rfl: 0    zolpidem (AMBIEN CR) 12 5 MG CR tablet, Take 1 tablet (12 5 mg total) by mouth daily at bedtime as needed for sleep, Disp: 30 tablet, Rfl: 0    Review of Systems  Constitutional:     Denies fever, chills, , weakness ,weight loss, weight gain   +fatigue    ENT: Denies earache, loss of hearing, nosebleed, nasal discharge,nasal congestion, sore throat,hoarseness, but complains of ear pressure,and crackling    Pulmonary: Denies shortness of breath ,cough , dyspnea on exertionon, orthopnea , PND   Cardiovascular:  Denies bradycardia , tachycardia ,palpations, lower extremity, edema leg, claudication  Breast:  Denies new or changing breast lumps,  nipple discharge, nipple changes,  Abdomen:  Denies abdominal pain , anorexia ,indigestion, nausea ,vomiting, constipation , diarrhea  Musculoskeletal: Denies myalgias, arthralgias, joint swelling, joint stiffness ,limb pain, limb swelling  Lymph:denies swollen glands  Gu: no dysuria or urinary frequency  Skin: Denies skin rash, skin lesion, skin wound, itching,dry skin  Neuro: Denies headache, numbness, tingling, confusion, loss of consciousness, but complains of postitional vertigo  Psychiatric: Denies feelings of depression, suicidal ideation, anxiety, +sleep disturbances    OBJECTIVE  BP 120/78   Pulse 84   Temp 97 6 °F (36 4 °C)   Resp 18   Ht 5' 8" (1 727 m)   Wt 99 8 kg (220 lb)   LMP 12/27/2020 (Exact Date)   SpO2 97%   BMI 33 45 kg/m²   Constitutional:   NAD, well appearing and well nourished      ENT:   Conjunctiva and lids: no injection, edema, or discharge     Pupils and iris: TREASURE bilaterally    External inspection of ears and nose: normal without deformities or discharge  Otoscopic exam: Canals patent with tm dull, no erythema,but large effusions noted bilaterally  ENasal mucosa, septum and turbinates: Turbinae injection with discharge   Oropharynx:  Moist mucosa, normal tongue and tonsils without lesions  Erythema and injection  of post pharynx with pnd      Pulmonary:Respiratory effort normal rate and rhythm, no increased work of breathing  Auscultation of lungs:  Clear bilaterally with no adventitious breath sounds       Cardiovascular: regular rate and rhythm, S1 and S2, no murmur, no edema and/or varicosities of LE      Abdomen: Soft and non-distended     Positive bowel sounds      No heptomegaly or splenomegaly      Lymphatic: Anterior and posterior cervical lymphadenopathy         Muscskeletal:  Gait and station: Normal gait      Digits and nails normal without clubbing or cyanosis       Inspection/palpation of joints, bones, and muscles:  No joint tenderness, swelling, full active and passive range of motion       Gu: no suprabubic tenderness, CVA tenderness or urethral discharge  Skin: Normal skin turgor and no rashes      Neuro:    Normal reflexes     Psych:   alert and oriented to person, place and time     normal mood and affect       Assessment/Plan:Diagnoses and all orders for this visit:    Eustachian tube dysfunction, bilateral  -     predniSONE 10 mg tablet; 3 tabs po bid x2 days, then 2 tabs po bid x2 days, then 1 tab bid x2 days, then 1 daily until done  Insomnia, unspecified type  Comments:  Pt to call with response to meds in 48 hrs      Orders:  - zolpidem (AMBIEN CR) 12 5 MG CR tablet; Take 1 tablet (12 5 mg total) by mouth daily at bedtime as needed for sleep    Hypercholesterolemia  -     Lipid Panel with Direct LDL reflex; Future    Other fatigue  -     TSH, 3rd generation; Future  -     Comprehensive metabolic panel; Future  -     Hemoglobin; Future    Major [facility] with single episode[facility]  Comments:  Cotninue lexapro therpay         Reviewed with patient plan to treat with above plan      Patient instructed to call in 72 hours if not feeling better or if symptoms worsen

## 2021-09-13 DIAGNOSIS — H66.90 ACUTE OTITIS MEDIA, UNSPECIFIED OTITIS MEDIA TYPE: Primary | ICD-10-CM

## 2021-09-13 PROBLEM — F32.5 MAJOR DEPRESSIVE DISORDER WITH SINGLE EPISODE, IN FULL REMISSION (HCC): Status: ACTIVE | Noted: 2021-09-13

## 2021-09-13 RX ORDER — AZITHROMYCIN 250 MG/1
TABLET, FILM COATED ORAL
Qty: 6 TABLET | Refills: 0 | Status: SHIPPED | OUTPATIENT
Start: 2021-09-13 | End: 2021-09-17

## 2021-10-09 ENCOUNTER — APPOINTMENT (OUTPATIENT)
Dept: LAB | Facility: CLINIC | Age: 51
End: 2021-10-09
Payer: COMMERCIAL

## 2021-10-09 DIAGNOSIS — E78.00 HYPERCHOLESTEROLEMIA: ICD-10-CM

## 2021-10-09 DIAGNOSIS — R53.83 OTHER FATIGUE: ICD-10-CM

## 2021-10-09 LAB
ALBUMIN SERPL BCP-MCNC: 3.9 G/DL (ref 3.5–5)
ALP SERPL-CCNC: 94 U/L (ref 46–116)
ALT SERPL W P-5'-P-CCNC: 33 U/L (ref 12–78)
ANION GAP SERPL CALCULATED.3IONS-SCNC: 10 MMOL/L (ref 4–13)
AST SERPL W P-5'-P-CCNC: 21 U/L (ref 5–45)
BILIRUB SERPL-MCNC: 0.39 MG/DL (ref 0.2–1)
BUN SERPL-MCNC: 14 MG/DL (ref 5–25)
CALCIUM SERPL-MCNC: 9.4 MG/DL (ref 8.3–10.1)
CHLORIDE SERPL-SCNC: 103 MMOL/L (ref 100–108)
CHOLEST SERPL-MCNC: 241 MG/DL (ref 50–200)
CO2 SERPL-SCNC: 27 MMOL/L (ref 21–32)
CREAT SERPL-MCNC: 1 MG/DL (ref 0.6–1.3)
GFR SERPL CREATININE-BSD FRML MDRD: 66 ML/MIN/1.73SQ M
GLUCOSE P FAST SERPL-MCNC: 96 MG/DL (ref 65–99)
HDLC SERPL-MCNC: 56 MG/DL
HGB BLD-MCNC: 15.4 G/DL (ref 11.5–15.4)
LDLC SERPL CALC-MCNC: 160 MG/DL (ref 0–100)
POTASSIUM SERPL-SCNC: 3.8 MMOL/L (ref 3.5–5.3)
PROT SERPL-MCNC: 7.6 G/DL (ref 6.4–8.2)
SODIUM SERPL-SCNC: 140 MMOL/L (ref 136–145)
TRIGL SERPL-MCNC: 127 MG/DL
TSH SERPL DL<=0.05 MIU/L-ACNC: 4.02 UIU/ML (ref 0.36–3.74)

## 2021-10-09 PROCEDURE — 84443 ASSAY THYROID STIM HORMONE: CPT

## 2021-10-09 PROCEDURE — 36415 COLL VENOUS BLD VENIPUNCTURE: CPT

## 2021-10-09 PROCEDURE — 80053 COMPREHEN METABOLIC PANEL: CPT

## 2021-10-09 PROCEDURE — 85018 HEMOGLOBIN: CPT

## 2021-10-09 PROCEDURE — 80061 LIPID PANEL: CPT

## 2021-10-18 ENCOUNTER — TELEPHONE (OUTPATIENT)
Dept: FAMILY MEDICINE CLINIC | Facility: CLINIC | Age: 51
End: 2021-10-18

## 2021-10-21 ENCOUNTER — OFFICE VISIT (OUTPATIENT)
Dept: FAMILY MEDICINE CLINIC | Facility: CLINIC | Age: 51
End: 2021-10-21
Payer: COMMERCIAL

## 2021-10-21 VITALS — HEART RATE: 82 BPM | DIASTOLIC BLOOD PRESSURE: 80 MMHG | SYSTOLIC BLOOD PRESSURE: 120 MMHG | TEMPERATURE: 98.1 F

## 2021-10-21 DIAGNOSIS — H69.82 DYSFUNCTION OF LEFT EUSTACHIAN TUBE: Primary | ICD-10-CM

## 2021-10-21 DIAGNOSIS — E03.9 HYPOTHYROIDISM, UNSPECIFIED TYPE: ICD-10-CM

## 2021-10-21 PROCEDURE — 99213 OFFICE O/P EST LOW 20 MIN: CPT | Performed by: FAMILY MEDICINE

## 2021-10-21 PROCEDURE — 1036F TOBACCO NON-USER: CPT | Performed by: FAMILY MEDICINE

## 2021-10-21 RX ORDER — AZELASTINE 1 MG/ML
1 SPRAY, METERED NASAL 2 TIMES DAILY
Qty: 30 ML | Refills: 1 | Status: SHIPPED | OUTPATIENT
Start: 2021-10-21 | End: 2021-11-15

## 2021-11-02 ENCOUNTER — TELEPHONE (OUTPATIENT)
Dept: FAMILY MEDICINE CLINIC | Facility: CLINIC | Age: 51
End: 2021-11-02

## 2021-11-05 ENCOUNTER — OFFICE VISIT (OUTPATIENT)
Dept: FAMILY MEDICINE CLINIC | Facility: CLINIC | Age: 51
End: 2021-11-05
Payer: COMMERCIAL

## 2021-11-05 ENCOUNTER — APPOINTMENT (OUTPATIENT)
Dept: LAB | Facility: CLINIC | Age: 51
End: 2021-11-05
Payer: COMMERCIAL

## 2021-11-05 VITALS
BODY MASS INDEX: 33.45 KG/M2 | TEMPERATURE: 96.1 F | RESPIRATION RATE: 16 BRPM | HEIGHT: 68 IN | DIASTOLIC BLOOD PRESSURE: 68 MMHG | HEART RATE: 66 BPM | SYSTOLIC BLOOD PRESSURE: 114 MMHG

## 2021-11-05 DIAGNOSIS — E03.9 HYPOTHYROIDISM, UNSPECIFIED TYPE: ICD-10-CM

## 2021-11-05 DIAGNOSIS — R79.89 ABNORMAL TSH: Primary | ICD-10-CM

## 2021-11-05 DIAGNOSIS — R79.89 ABNORMAL TSH: ICD-10-CM

## 2021-11-05 LAB — TSH SERPL DL<=0.05 MIU/L-ACNC: 1.64 UIU/ML (ref 0.36–3.74)

## 2021-11-05 PROCEDURE — 99213 OFFICE O/P EST LOW 20 MIN: CPT | Performed by: FAMILY MEDICINE

## 2021-11-05 PROCEDURE — 36415 COLL VENOUS BLD VENIPUNCTURE: CPT

## 2021-11-05 PROCEDURE — 1036F TOBACCO NON-USER: CPT | Performed by: FAMILY MEDICINE

## 2021-11-05 PROCEDURE — 3725F SCREEN DEPRESSION PERFORMED: CPT | Performed by: FAMILY MEDICINE

## 2021-11-05 PROCEDURE — 84443 ASSAY THYROID STIM HORMONE: CPT

## 2021-11-15 DIAGNOSIS — H69.82 DYSFUNCTION OF LEFT EUSTACHIAN TUBE: ICD-10-CM

## 2021-11-15 RX ORDER — AZELASTINE 1 MG/ML
SPRAY, METERED NASAL
Qty: 1 ML | Refills: 1 | Status: SHIPPED | OUTPATIENT
Start: 2021-11-15 | End: 2021-11-22

## 2021-12-14 ENCOUNTER — HOSPITAL ENCOUNTER (OUTPATIENT)
Dept: MAMMOGRAPHY | Facility: CLINIC | Age: 51
Discharge: HOME | End: 2021-12-14
Payer: COMMERCIAL

## 2021-12-14 VITALS — HEIGHT: 68 IN | BODY MASS INDEX: 33.35 KG/M2 | WEIGHT: 220.02 LBS

## 2021-12-14 DIAGNOSIS — Z12.31 ENCOUNTER FOR SCREENING MAMMOGRAM FOR MALIGNANT NEOPLASM OF BREAST: ICD-10-CM

## 2021-12-14 PROCEDURE — 77063 BREAST TOMOSYNTHESIS BI: CPT

## 2021-12-14 PROCEDURE — 77067 SCR MAMMO BI INCL CAD: CPT

## 2021-12-21 ENCOUNTER — TELEPHONE (OUTPATIENT)
Dept: FAMILY MEDICINE CLINIC | Facility: CLINIC | Age: 51
End: 2021-12-21

## 2021-12-21 DIAGNOSIS — H66.90 ACUTE OTITIS MEDIA, UNSPECIFIED OTITIS MEDIA TYPE: Primary | ICD-10-CM

## 2021-12-21 RX ORDER — AZITHROMYCIN 250 MG/1
TABLET, FILM COATED ORAL
Qty: 6 TABLET | Refills: 0 | Status: SHIPPED | OUTPATIENT
Start: 2021-12-21 | End: 2021-12-25

## 2021-12-27 ENCOUNTER — HOSPITAL ENCOUNTER (OUTPATIENT)
Dept: ULTRASOUND IMAGING | Facility: CLINIC | Age: 51
Discharge: HOME | End: 2021-12-27
Payer: COMMERCIAL

## 2021-12-27 DIAGNOSIS — R92.8 ABNORMAL MAMMOGRAM: ICD-10-CM

## 2021-12-27 PROCEDURE — 76642 ULTRASOUND BREAST LIMITED: CPT

## 2022-02-11 DIAGNOSIS — F32.A DEPRESSION, UNSPECIFIED DEPRESSION TYPE: ICD-10-CM

## 2022-02-11 RX ORDER — ESCITALOPRAM OXALATE 20 MG/1
TABLET ORAL
Qty: 90 TABLET | Refills: 0 | Status: SHIPPED | OUTPATIENT
Start: 2022-02-11 | End: 2022-05-10

## 2022-03-25 ENCOUNTER — LAB (OUTPATIENT)
Dept: LAB | Facility: AMBULARY SURGERY CENTER | Age: 52
End: 2022-03-25
Attending: OBSTETRICS & GYNECOLOGY
Payer: COMMERCIAL

## 2022-03-25 ENCOUNTER — ANNUAL EXAM (OUTPATIENT)
Dept: OBGYN CLINIC | Facility: CLINIC | Age: 52
End: 2022-03-25
Payer: COMMERCIAL

## 2022-03-25 VITALS — BODY MASS INDEX: 33.45 KG/M2 | SYSTOLIC BLOOD PRESSURE: 118 MMHG | HEIGHT: 68 IN | DIASTOLIC BLOOD PRESSURE: 68 MMHG

## 2022-03-25 DIAGNOSIS — Z12.31 ENCOUNTER FOR SCREENING MAMMOGRAM FOR MALIGNANT NEOPLASM OF BREAST: ICD-10-CM

## 2022-03-25 DIAGNOSIS — N95.1 PERIMENOPAUSAL: ICD-10-CM

## 2022-03-25 DIAGNOSIS — Z01.419 WELL WOMAN EXAM WITH ROUTINE GYNECOLOGICAL EXAM: Primary | ICD-10-CM

## 2022-03-25 LAB — TSH SERPL DL<=0.05 MIU/L-ACNC: 2.87 UIU/ML (ref 0.36–3.74)

## 2022-03-25 PROCEDURE — S0612 ANNUAL GYNECOLOGICAL EXAMINA: HCPCS | Performed by: OBSTETRICS & GYNECOLOGY

## 2022-03-25 PROCEDURE — 36415 COLL VENOUS BLD VENIPUNCTURE: CPT

## 2022-03-25 PROCEDURE — 84443 ASSAY THYROID STIM HORMONE: CPT

## 2022-03-25 NOTE — PROGRESS NOTES
ASSESSMENT & PLAN:   Diagnoses and all orders for this visit:    Well woman exam with routine gynecological exam    Encounter for screening mammogram for malignant neoplasm of breast  -     Mammo screening bilateral w 3d & cad; Future    Perimenopausal  -     TSH, 3rd generation with Free T4 reflex; Future    Other orders  -     Magnesium Gluconate (MAGNESIUM 27 PO); Take by mouth          The following were reviewed in today's visit: ASCCP guidelines,  STD testing breast self exam, mammography screening ordered, use and side effects of HRT, menopause, adequate intake of calcium and vitamin D, exercise and healthy diet  Patient to return to office in yearly for annual exam      All questions have been answered to her satisfaction  CC:  Annual Gynecologic Examination  Chief Complaint   Patient presents with    Gynecologic Exam     Patient here for annual exam, pap not indicated due to hysterectomy  Mammo order placed  She is having extreme hot flahses  HPI: Deedee Cristobal is a 49 y o  [id number] who presents for annual gynecologic examination  She has the following concerns:  Weight loss, hot flushes, repeat TSH      Health Maintenance:    Exercise: daily  Breast exams/breast awareness: yes  Diet: well balanced diet  Last mammogram:   Colorectal cancer screenin      Past Medical History:   Diagnosis Date    Anxiety     Asthma     no current issues    Colitis     Dysfunctional uterine bleeding        Past Surgical History:   Procedure Laterality Date    DILATION AND CURETTAGE OF UTERUS      DILATION AND CURETTAGE OF UTERUS N/A 2017    Procedure: DILATATION AND CURETTAGE (D&C);   Surgeon: Donta Moya MD;  [facility];  Service:     ENDOMETRIAL ABLATION N/A 2017    Procedure: Dg Plunkett;  Surgeon: Donta Moya MD;  [facility];  Service:    [facility] HYSTERECTOMY  2021    INDUCED       By Dilation and Evacuation    OOPHORECTOMY Left 2/8/2021    Procedure: Oliver Huddlestono;  Surgeon: Nolvia Barraza MD;  [facility];  Service: Gynecology    [place] CYSTOURETHROSCOPY N/A 2/8/2021    Procedure: Harland Rubinstein;  Surgeon: Nolvia Barraza MD;  [facility];  Service: Gynecology    [place] LAP,DIAGNOSTIC ABDOMEN N/A 2/8/2021    Procedure: LAPAROSCOPY DIAGNOSTIC;  Surgeon: Nolvia Barraza MD;  [facility];  Service: Gynecology    [place] VAG HYST,RMV TUBE/OVARY N/A 2/8/2021    Procedure: HYSTERECTOMY VAGINAL TOTAL (TVH) WITH BILATERAL SALPINGECTOMY;  Surgeon: Nolvia Barraza MD;  [facility];  Service: Gynecology       Past OB/Gyn History:   Patient's last menstrual period was 12/27/2020 (exact date)  Menopausal status: perimenopausal  Menopausal symptoms: yes    Last Pap: 2020 : no abnormalities  History of abnormal Pap smear: no    Patient is currently sexually active     STD testing: no  Current contraception: none      Family History  Family History   Problem Relation Age of Onset    Hypothyroidism Mother     Lung cancer Mother 72    Heart disease Father     Venous thrombosis Father         of Deep Vessels of Lower Extremity    Aortic aneurysm Father     Breast cancer Paternal Grandmother 61    Hypertension Paternal Grandfather     Breast cancer Maternal Aunt 48    No Known Problems Maternal Aunt     No Known Problems Maternal Aunt     Breast cancer Paternal Aunt 48    Breast cancer Paternal Aunt 48    No Known Problems Paternal Aunt     No Known Problems Paternal [de-identified]     Esophageal cancer Paternal Uncle 61       Family history of uterine or ovarian cancer: no  Family history of breast cancer: no  Family history of colon cancer: no    Social History:  Social History     Socioeconomic History    Marital status: /Civil Union     Spouse name: Not on file    Number of children: Not on file    Years of education: Not on file    Highest education level: Not on file   Occupational History    Not on file   Tobacco Use    Smoking status: Former Smoker     Packs/day: 1 00     Years: 0 00     Pack years: 0 00    Smokeless tobacco: Former User     Quit date: 2003   Vaping Use    Vaping Use: Never used   Substance and Sexual Activity    Alcohol use: Yes     Comment: socially    Drug use: Never    Sexual activity: Yes     Partners: Male     Birth control/protection: None, Surgical, Spermicide, Post-menopausal   Other Topics Concern    Not on file   Social History Narrative    Daily coffee consumption , 2 cups/day    Exercise regularly     Social Determinants of Health     Financial Resource Strain: Not on file   Food Insecurity: Not on file   Transportation Needs: Not on file   Physical Activity: Not on file   Stress: Not on file   Social Connections: Not on file   Intimate Partner Violence: Not on file   Housing Stability: Not on file     Domestic violence screen: negative    Allergies:  No Known Allergies    Medications:    Current Outpatient Medications:     escitalopram (LEXAPRO) 20 mg tablet, TAKE 1 TABLET BY MOUTH EVERY DAY, Disp: 90 tablet, Rfl: 0    Magnesium Gluconate (MAGNESIUM 27 PO), Take by mouth, Disp: , Rfl:     Review of Systems:  Review of Systems   Constitutional: Negative  HENT: Negative  Respiratory: Negative  Cardiovascular: Negative  Gastrointestinal: Negative  Genitourinary: Negative  Neurological: Negative  Psychiatric/Behavioral: Negative  Physical Exam:  /68   Ht 5' 8" (1 727 m)   LMP 12/27/2020 (Exact Date)   BMI 33 45 kg/m²    Physical Exam  Constitutional:       Appearance: Normal appearance  Genitourinary:      Bladder and urethral meatus normal       No lesions in the vagina  Right Labia: No rash, tenderness, lesions, skin changes or Bartholin's cyst      Left Labia: No tenderness, lesions, skin changes, Bartholin's cyst or rash  No vaginal erythema, tenderness or bleeding          Right Adnexa: not tender, not full and no mass present  Left Adnexa: absent  Cervix is absent  Uterus is absent  Urethral meatus caruncle not present  No urethral tenderness or mass present  Breasts:      Right: No swelling, bleeding, inverted nipple, mass, nipple discharge, skin change or tenderness  Left: No swelling, bleeding, inverted nipple, mass, nipple discharge, skin change or tenderness  HENT:      Head: Normocephalic and atraumatic  Eyes:      Extraocular Movements: Extraocular movements intact  Conjunctiva/sclera: Conjunctivae normal       Pupils: Pupils are equal, round, and reactive to light  Cardiovascular:      Rate and Rhythm: Normal rate and regular rhythm  Heart sounds: Normal heart sounds  No murmur heard  Pulmonary:      Effort: Pulmonary effort is normal  No respiratory distress  Breath sounds: Normal breath sounds  No wheezing or rales  Abdominal:      General: There is no distension  Palpations: Abdomen is soft  Tenderness: There is no abdominal tenderness  There is no guarding  Neurological:      General: No focal deficit present  Mental Status: She is alert and oriented to person, place, and time  Skin:     General: Skin is warm and dry  Psychiatric:         Mood and Affect: Mood normal          Behavior: Behavior normal    Vitals and nursing note reviewed

## 2022-05-10 DIAGNOSIS — F32.A DEPRESSION, UNSPECIFIED DEPRESSION TYPE: ICD-10-CM

## 2022-05-10 RX ORDER — ESCITALOPRAM OXALATE 20 MG/1
TABLET ORAL
Qty: 90 TABLET | Refills: 0 | Status: SHIPPED | OUTPATIENT
Start: 2022-05-10 | End: 2022-07-15 | Stop reason: SDUPTHER

## 2022-05-23 ENCOUNTER — TELEPHONE (OUTPATIENT)
Dept: FAMILY MEDICINE CLINIC | Facility: CLINIC | Age: 52
End: 2022-05-23

## 2022-05-23 DIAGNOSIS — J01.90 ACUTE SINUSITIS, RECURRENCE NOT SPECIFIED, UNSPECIFIED LOCATION: Primary | ICD-10-CM

## 2022-05-23 RX ORDER — AZITHROMYCIN 250 MG/1
TABLET, FILM COATED ORAL
Qty: 6 TABLET | Refills: 0 | Status: SHIPPED | OUTPATIENT
Start: 2022-05-23 | End: 2022-05-27

## 2022-05-23 RX ORDER — BROMPHENIRAMINE MALEATE, PSEUDOEPHEDRINE HYDROCHLORIDE, AND DEXTROMETHORPHAN HYDROBROMIDE 2; 30; 10 MG/5ML; MG/5ML; MG/5ML
10 SYRUP ORAL 4 TIMES DAILY PRN
Qty: 180 ML | Refills: 0 | Status: SHIPPED | OUTPATIENT
Start: 2022-05-23

## 2022-05-23 NOTE — TELEPHONE ENCOUNTER
Patient has had sore throat, blocked ears, coughing, loss of voice, chest congestion, green/yellow mucus     Symptoms began 3 days ago    Denies fever/chills, diarrhea or nausea       Home Covid test was neg yesterday     Would like to know if she should be seen or if you could call something in to pharmacy

## 2022-07-15 DIAGNOSIS — F32.A DEPRESSION, UNSPECIFIED DEPRESSION TYPE: ICD-10-CM

## 2022-07-15 RX ORDER — ESCITALOPRAM OXALATE 20 MG/1
20 TABLET ORAL DAILY
Qty: 90 TABLET | Refills: 0 | Status: SHIPPED | OUTPATIENT
Start: 2022-07-15 | End: 2022-09-28 | Stop reason: SDUPTHER

## 2022-09-21 ENCOUNTER — OFFICE VISIT (OUTPATIENT)
Dept: FAMILY MEDICINE CLINIC | Facility: CLINIC | Age: 52
End: 2022-09-21
Payer: COMMERCIAL

## 2022-09-21 ENCOUNTER — TELEPHONE (OUTPATIENT)
Dept: FAMILY MEDICINE CLINIC | Facility: CLINIC | Age: 52
End: 2022-09-21

## 2022-09-21 ENCOUNTER — APPOINTMENT (OUTPATIENT)
Dept: LAB | Facility: CLINIC | Age: 52
End: 2022-09-21
Payer: COMMERCIAL

## 2022-09-21 VITALS
HEIGHT: 68 IN | OXYGEN SATURATION: 97 % | WEIGHT: 237.8 LBS | BODY MASS INDEX: 36.04 KG/M2 | SYSTOLIC BLOOD PRESSURE: 122 MMHG | TEMPERATURE: 97.9 F | HEART RATE: 80 BPM | DIASTOLIC BLOOD PRESSURE: 84 MMHG

## 2022-09-21 DIAGNOSIS — R23.2 HOT FLASHES: ICD-10-CM

## 2022-09-21 DIAGNOSIS — J01.90 ACUTE SINUSITIS, RECURRENCE NOT SPECIFIED, UNSPECIFIED LOCATION: Primary | ICD-10-CM

## 2022-09-21 LAB
ESTRADIOL SERPL-MCNC: 19 PG/ML
FSH SERPL-ACNC: 61.4 MIU/ML
LH SERPL-ACNC: 38.1 MIU/ML
TSH SERPL DL<=0.05 MIU/L-ACNC: 2.39 UIU/ML (ref 0.45–4.5)

## 2022-09-21 PROCEDURE — 99214 OFFICE O/P EST MOD 30 MIN: CPT | Performed by: FAMILY MEDICINE

## 2022-09-21 PROCEDURE — 82670 ASSAY OF TOTAL ESTRADIOL: CPT

## 2022-09-21 PROCEDURE — 83002 ASSAY OF GONADOTROPIN (LH): CPT

## 2022-09-21 PROCEDURE — 36415 COLL VENOUS BLD VENIPUNCTURE: CPT

## 2022-09-21 PROCEDURE — 84443 ASSAY THYROID STIM HORMONE: CPT

## 2022-09-21 PROCEDURE — 83001 ASSAY OF GONADOTROPIN (FSH): CPT

## 2022-09-21 RX ORDER — CEFUROXIME AXETIL 500 MG/1
500 TABLET ORAL EVERY 12 HOURS SCHEDULED
Qty: 20 TABLET | Refills: 0 | Status: SHIPPED | OUTPATIENT
Start: 2022-09-21 | End: 2022-09-22 | Stop reason: SDUPTHER

## 2022-09-21 RX ORDER — BROMPHENIRAMINE MALEATE, PSEUDOEPHEDRINE HYDROCHLORIDE, AND DEXTROMETHORPHAN HYDROBROMIDE 2; 30; 10 MG/5ML; MG/5ML; MG/5ML
10 SYRUP ORAL 4 TIMES DAILY PRN
Qty: 180 ML | Refills: 0 | Status: SHIPPED | OUTPATIENT
Start: 2022-09-21 | End: 2022-09-22 | Stop reason: SDUPTHER

## 2022-09-21 RX ORDER — CEFUROXIME AXETIL 500 MG/1
500 TABLET ORAL EVERY 12 HOURS SCHEDULED
Qty: 20 TABLET | Refills: 0 | Status: SHIPPED | OUTPATIENT
Start: 2022-09-21 | End: 2022-09-21 | Stop reason: SDUPTHER

## 2022-09-21 RX ORDER — BROMPHENIRAMINE MALEATE, PSEUDOEPHEDRINE HYDROCHLORIDE, AND DEXTROMETHORPHAN HYDROBROMIDE 2; 30; 10 MG/5ML; MG/5ML; MG/5ML
10 SYRUP ORAL 4 TIMES DAILY PRN
Qty: 180 ML | Refills: 0 | Status: SHIPPED | OUTPATIENT
Start: 2022-09-21 | End: 2022-09-21 | Stop reason: SDUPTHER

## 2022-09-21 NOTE — PROGRESS NOTES
BMI Counseling: Body mass index is 36 16 kg/m²  The BMI is above normal  Nutrition recommendations include reducing portion sizes, decreasing overall calorie intake and moderation in carbohydrate intake  Patient ID: Sudhir Palencia is a 47 y o  female  HPI: 47 y  o female presenting with symptoms of sinus pain,pressure, nasal congestion, pnd dry cough , ear and throat pain  Symptoms for past week; pt has taken several negative covid tests  She also complains of hot flashes for last few mos which are severe in nature  Pt had a hysterectomy and one ovary removed years ago         SUBJECTIVE    Family History   Problem Relation Age of Onset    Hypothyroidism Mother     Lung cancer Mother 72    Heart disease Father     Venous thrombosis Father         of Deep Vessels of Lower Extremity    Aortic aneurysm Father     Breast cancer Paternal Grandmother 61    Hypertension Paternal Grandfather     Breast cancer Maternal Aunt 48    No Known Problems Maternal Aunt     No Known Problems Maternal Aunt     Breast cancer Paternal Aunt 48    Breast cancer Paternal Aunt 48    No Known Problems Paternal Aunt     No Known Problems Paternal [de-identified]     Esophageal cancer Paternal Uncle 61     Social History     Socioeconomic History    Marital status: /Civil Union     Spouse name: Not on file    Number of children: Not on file    Years of education: Not on file    Highest education level: Not on file   Occupational History    Not on file   Tobacco Use    Smoking status: Former Smoker     Packs/day: 1 00     Years: 0 00     Pack years: 0 00    Smokeless tobacco: Former User     Quit date: 2003   Vaping Use    Vaping Use: Never used   Substance and Sexual Activity    Alcohol use: Yes     Comment: socially    Drug use: Never    Sexual activity: Yes     Partners: Male     Birth control/protection: None, Surgical, Spermicide, Post-menopausal   Other Topics Concern    Not on file   Social History Narrative Daily coffee consumption , 2 cups/day    Exercise regularly     Social Determinants of Health     Financial Resource Strain: Not on file   Food Insecurity: Not on file   Transportation Needs: Not on file   Physical Activity: Not on file   Stress: Not on file   Social Connections: Not on file   Intimate Partner Violence: Not on file   Housing Stability: Not on file     Past Medical History:   Diagnosis Date    Anxiety     Asthma     no current issues    Colitis     Dysfunctional uterine bleeding      Past Surgical History:   Procedure Laterality Date    DILATION AND CURETTAGE OF UTERUS      DILATION AND CURETTAGE OF UTERUS N/A 2017    Procedure: DILATATION AND CURETTAGE (D&C);   Surgeon: Yoni Xie MD;  [facility];  Service:     ENDOMETRIAL ABLATION N/A 2017    Procedure: Beny Truong;  Surgeon: Yoni Xie MD;  [facility];  Service:    [facility] HYSTERECTOMY  2021    INDUCED       By Dilation and Evacuation    OOPHORECTOMY Left 2021    Procedure: Weston Fam;  Surgeon: Mansoor Gonzalez MD;  [facility];  Service: Gynecology    NE CYSTOURETHROSCOPY N/A 2021    Procedure: Herrera Abdi;  Surgeon: Mansoor Gonzalez MD;  [facility];  Service: Gynecology    NE LAP,DIAGNOSTIC ABDOMEN N/A 2021    Procedure: LAPAROSCOPY DIAGNOSTIC;  Surgeon: Mansoor Gonzalez MD;  [facility];  Service: Gynecology    NE VAG HYST,RMV TUBE/OVARY N/A 2021    Procedure: HYSTERECTOMY VAGINAL TOTAL (TVH) WITH BILATERAL SALPINGECTOMY;  Surgeon: Mansoor Gonzalez MD;  [facility];  Service: Gynecology     No Known Allergies    Current Outpatient Medications:     escitalopram (LEXAPRO) 20 mg tablet, Take 1 tablet (20 mg total) by mouth daily (Patient taking differently: Take 40 mg by mouth daily), Disp: 90 tablet, Rfl: 0    Magnesium Gluconate (MAGNESIUM 27 PO), Take by mouth, Disp: , Rfl:    brompheniramine-pseudoephedrine-DM 30-2-10 MG/5ML syrup, Take 10 mL by mouth as needed in the morning and 10 mL as needed at noon and 10 mL as needed in the evening and 10 mL as needed before bedtime for congestion or cough   (Patient not taking: Reported on 9/21/2022), Disp: 180 mL, Rfl: 0    brompheniramine-pseudoephedrine-DM 30-2-10 MG/5ML syrup, Take 10 mL by mouth 4 (four) times a day as needed for congestion or cough, Disp: 180 mL, Rfl: 0    cefuroxime (CEFTIN) 500 mg tablet, Take 1 tablet (500 mg total) by mouth every 12 (twelve) hours for 10 days, Disp: 20 tablet, Rfl: 0    Review of Systems  Constitutional:     Denies fever, chills, fatigue, weakness ,weight loss, weight gain + hot flashes     ENT: Denies earache, loss of hearing, nosebleed, nasal discharge,but complains of nasal congestion, sore throat,hoarseness and sinus pain and pressure    Pulmonary: Denies shortness of breath ,cough , dyspnea on exertionon, orthopnea ,+ PND   Cardiovascular:  Denies bradycardia , tachycardia ,palpations, lower extremity, edema leg, claudication  Breast:  Denies new or changing breast lumps,  nipple discharge, nipple changes,  Abdomen:  Denies abdominal pain , anorexia ,indigestion, nausea ,vomiting, constipation , diarrhea  Musculoskeletal: Denies myalgias, arthralgias, joint swelling, joint stiffness ,limb pain, limb swelling  Lymph:+ swollen glands  Gu: no dysuria or urinary frequency  Skin: Denies skin rash, skin lesion, skin wound, itching,dry skin  Neuro: Denies headache, numbness, tingling, confusion, loss of consciousness, dizziness ,vertigo  Psychiatric: Denies feelings of depression, suicidal ideation, anxiety, sleep disturbances    OBJECTIVE  /84   Pulse 80   Temp 97 9 °F (36 6 °C)   Ht 5' 8" (1 727 m)   Wt 108 kg (237 lb 12 8 oz)   LMP 12/27/2020 (Exact Date)   SpO2 97%   BMI 36 16 kg/m²   Constitutional:   NAD, well appearing and well nourished      ENT:   Conjunctiva and lids: no injection, edema, or discharge    Pupils and iris: TREASURE bilaterally   External inspection of ears and nose: normal without deformities or discharge  Otoscopic exam: Canals patent ; tm are dull, with with erythem and effusions  ENasal mucosa, septum and turbinates: Turbinae injection with discharge Oropharynx:  Moist mucosa, normal tongue and tonsils without lesions  Erythema and injection  of post pharynx with pnd      Pulmonary:Respiratory effort normal rate and rhythm, no increased work of breathing  Auscultation of lungs:  Clear bilaterally with no adventitious breath sounds       Cardiovascular: regular rate and rhythm, S1 and S2, no murmur, no edema and/or varicosities of LE      Abdomen: Soft and non-distended    Positive bowel sounds    No heptomegaly or splenomegaly    Lymphatic: Anterior  cervical lymphadenopathy         Muscskeletal:  Gait and station: Normal gait     Digits and nails normal without clubbing or cyanosis     Inspection/palpation of joints, bones, and muscles:  No joint tenderness, swelling, full active and passive range of motion      Gu: no suprabubic tenderness, CVA tenderness or urethral discharge  Skin: Normal skin turgor and no rashes    Neuro:    Normal reflexes   Psych:   alert and oriented to person, place and time  normal mood and affect      Assessment/Plan:Diagnoses and all orders for this visit:    Acute sinusitis, recurrence not specified, unspecified location  -     Discontinue: brompheniramine-pseudoephedrine-DM 30-2-10 MG/5ML syrup; Take 10 mL by mouth 4 (four) times a day as needed for congestion or cough  -     Discontinue: cefuroxime (CEFTIN) 500 mg tablet; Take 1 tablet (500 mg total) by mouth every 12 (twelve) hours for 10 days  -     Discontinue: brompheniramine-pseudoephedrine-DM 30-2-10 MG/5ML syrup; Take 10 mL by mouth 4 (four) times a day as needed for congestion or cough  -     Discontinue: cefuroxime (CEFTIN) 500 mg tablet;  Take 1 tablet (500 mg total) by mouth every 12 (twelve) hours for 10 days    Hot flashes  Comments: Will make recommendations pending results of hormone levels  Orders:  -     TSH, 3rd generation; Future  -     FSH and LH; Future  -     Estradiol; Future        Reviewed with patient plan to treat with above plan      Patient instructed to call in 72 hours if not feeling better or if symptoms worsen

## 2022-09-21 NOTE — TELEPHONE ENCOUNTER
Patient was seen today and prescribed cough medicine and an antibiotic    Pharmacy received the script for the cough medicine but not for the Ceftin 500 mg tablets    Please call [facility] on [address]

## 2022-09-22 ENCOUNTER — TELEPHONE (OUTPATIENT)
Dept: FAMILY MEDICINE CLINIC | Facility: CLINIC | Age: 52
End: 2022-09-22

## 2022-09-22 DIAGNOSIS — J01.90 ACUTE SINUSITIS, RECURRENCE NOT SPECIFIED, UNSPECIFIED LOCATION: ICD-10-CM

## 2022-09-22 RX ORDER — CEFUROXIME AXETIL 500 MG/1
500 TABLET ORAL EVERY 12 HOURS SCHEDULED
Qty: 20 TABLET | Refills: 0 | Status: SHIPPED | OUTPATIENT
Start: 2022-09-22 | End: 2022-10-02

## 2022-09-22 RX ORDER — BROMPHENIRAMINE MALEATE, PSEUDOEPHEDRINE HYDROCHLORIDE, AND DEXTROMETHORPHAN HYDROBROMIDE 2; 30; 10 MG/5ML; MG/5ML; MG/5ML
10 SYRUP ORAL 4 TIMES DAILY PRN
Qty: 180 ML | Refills: 0 | Status: SHIPPED | OUTPATIENT
Start: 2022-09-22

## 2022-09-22 NOTE — TELEPHONE ENCOUNTER
----- Message from Stefania Gaston sent at 9/22/2022  7:26 AM EDT -----  Regarding: FW: Antibiotic EC still not at [facility]    ----- Message -----  From: Meryl Iraheta  Sent: 9/21/2022   5:57 PM EDT  To: Ky Lama [facility]  Subject: Antibiotic EC still not at [facility]                   Antibiotic RX not EC

## 2022-09-22 NOTE — TELEPHONE ENCOUNTER
----- Message from Stefania Damon sent at 9/22/2022  7:26 AM EDT -----  Regarding: FW: Antibiotic EC still not at [facility]    ----- Message -----  From: Anuel Castellanos  Sent: 9/21/2022   5:57 PM EDT  To: [facility]  Subject: Antibiotic EC still not at [facility]                   Antibiotic RX not EC

## 2022-09-27 DIAGNOSIS — B37.31 VAGINAL YEAST INFECTION: Primary | ICD-10-CM

## 2022-09-27 RX ORDER — FLUCONAZOLE 150 MG/1
150 TABLET ORAL ONCE
Qty: 1 TABLET | Refills: 0 | Status: SHIPPED | OUTPATIENT
Start: 2022-09-27 | End: 2022-09-27

## 2022-09-28 DIAGNOSIS — F32.A DEPRESSION, UNSPECIFIED DEPRESSION TYPE: ICD-10-CM

## 2022-09-28 RX ORDER — ESCITALOPRAM OXALATE 20 MG/1
20 TABLET ORAL DAILY
Qty: 90 TABLET | Refills: 0 | Status: SHIPPED | OUTPATIENT
Start: 2022-09-28

## 2022-11-07 ENCOUNTER — OFFICE VISIT (OUTPATIENT)
Dept: FAMILY MEDICINE CLINIC | Facility: CLINIC | Age: 52
End: 2022-11-07

## 2022-11-07 ENCOUNTER — TELEPHONE (OUTPATIENT)
Dept: FAMILY MEDICINE CLINIC | Facility: CLINIC | Age: 52
End: 2022-11-07

## 2022-11-07 VITALS
SYSTOLIC BLOOD PRESSURE: 134 MMHG | DIASTOLIC BLOOD PRESSURE: 80 MMHG | OXYGEN SATURATION: 98 % | HEIGHT: 68 IN | TEMPERATURE: 97.6 F | BODY MASS INDEX: 36.83 KG/M2 | WEIGHT: 243 LBS | HEART RATE: 68 BPM

## 2022-11-07 DIAGNOSIS — J01.20 ACUTE NON-RECURRENT ETHMOIDAL SINUSITIS: Primary | ICD-10-CM

## 2022-11-07 PROBLEM — Z90.710 H/O TOTAL VAGINAL HYSTERECTOMY: Status: RESOLVED | Noted: 2021-02-19 | Resolved: 2022-11-07

## 2022-11-07 PROBLEM — Z90.721 S/P LEFT OOPHORECTOMY: Status: RESOLVED | Noted: 2021-02-19 | Resolved: 2022-11-07

## 2022-11-07 RX ORDER — AZITHROMYCIN 250 MG/1
TABLET, FILM COATED ORAL
Qty: 6 TABLET | Refills: 0 | Status: SHIPPED | OUTPATIENT
Start: 2022-11-07 | End: 2022-11-11

## 2022-11-07 NOTE — PROGRESS NOTES
Name: Alexa Cooney      : 1970      MRN: [id number]  Encounter Provider: DACIA Barros  Encounter Date: 2022   Encounter department: [address] [place] [address]     1  Acute non-recurrent ethmoidal sinusitis  -     azithromycin (ZITHROMAX) 250 mg tablet; Take 2 tabs on Day 1 than 1 tab Days 2-5  Discussed with patient plan to treat with a course of azithromycin   Discussed with patient possible need to refer if stabbing pain continues  Patient instructed to call if no improvement in 72 hours or symptoms worsen       Subjective      52 y  o female presenting with ear pain and congestion for the past few days  She denies fever, chills, generalized body aches, headache, respiratory or gastrointestional symptoms  She reports sometimes if feels like something is stabbing her in the right ear than it resolves quickly  Review of Systems   Constitutional: Negative for chills, fatigue and fever  HENT: Positive for congestion and ear pain  Respiratory: Negative  Cardiovascular: Negative  Gastrointestinal: Negative  Musculoskeletal: Negative for myalgias  Neurological: Negative  Psychiatric/Behavioral: Negative  Current Outpatient Medications on File Prior to Visit   Medication Sig   • escitalopram (LEXAPRO) 20 mg tablet Take 1 tablet (20 mg total) by mouth daily   • Magnesium Gluconate (MAGNESIUM 27 PO) Take by mouth   • [DISCONTINUED] brompheniramine-pseudoephedrine-DM 30-2-10 MG/5ML syrup Take 10 mL by mouth as needed in the morning and 10 mL as needed at noon and 10 mL as needed in the evening and 10 mL as needed before bedtime for congestion or cough   (Patient not taking: Reported on 2022)   • [DISCONTINUED] brompheniramine-pseudoephedrine-DM 30-2-10 MG/5ML syrup Take 10 mL by mouth 4 (four) times a day as needed for congestion or cough (Patient not taking: Reported on 2022)       Objective     /80   Pulse 68   Temp 97 6 °F (36 4 °C)   Ht 5' 8" (1 727 m)   Wt 110 kg (243 lb)   LMP 12/27/2020 (Exact Date)   SpO2 98%   BMI 36 95 kg/m² (Reviewed)    Physical Exam  Vitals reviewed  Constitutional:       General: She is not in acute distress  Appearance: She is well-developed and well-groomed  She is not ill-appearing  HENT:      Head: Normocephalic and atraumatic  Right Ear: Ear canal and external ear normal  Tympanic membrane is injected  Left Ear: Ear canal and external ear normal  A middle ear effusion is present  Tympanic membrane is injected  Nose: Congestion present  Eyes:      General: Lids are normal       Extraocular Movements: Extraocular movements intact  Conjunctiva/sclera: Conjunctivae normal       Pupils: Pupils are equal, round, and reactive to light  Neck:      Trachea: Trachea and phonation normal    Cardiovascular:      Rate and Rhythm: Normal rate and regular rhythm  Heart sounds: Normal heart sounds  Pulmonary:      Effort: Pulmonary effort is normal       Breath sounds: Normal breath sounds  Skin:     General: Skin is warm and dry  Capillary Refill: Capillary refill takes less than 2 seconds  Neurological:      General: No focal deficit present  Mental Status: She is alert and oriented to person, place, and time  Psychiatric:         Mood and Affect: Mood normal          Behavior: Behavior normal  Behavior is cooperative  Thought Content:  Thought content normal        DACIA Caceres

## 2022-11-07 NOTE — TELEPHONE ENCOUNTER
----- Message from Holden Pedraza sent at 11/7/2022  8:47 AM EST -----  Regarding: FW: Severe ear pain    ----- Message -----  From: Miguelangel Abreu  Sent: 11/7/2022   8:41 AM EST  To: Osmel Brennan [facility] Clinical  Subject: Severe ear pain                                  Good morning,   Since Friday night I have had very sharp pain in my right ear  It is extremely painful  It’s not constant, but feels like pin pricks every few minutes and causes me to gasp at times  I used heat this weekend to help me sleep  I also have some congestion and post nasal drip  Can I be seen today or have something called in?  Thank you

## 2022-12-21 DIAGNOSIS — F32.A DEPRESSION, UNSPECIFIED DEPRESSION TYPE: ICD-10-CM

## 2022-12-21 RX ORDER — ESCITALOPRAM OXALATE 20 MG/1
20 TABLET ORAL DAILY
Qty: 90 TABLET | Refills: 0 | Status: SHIPPED | OUTPATIENT
Start: 2022-12-21

## 2022-12-23 ENCOUNTER — HOSPITAL ENCOUNTER (OUTPATIENT)
Dept: RADIOLOGY | Age: 52
Discharge: HOME | End: 2022-12-23

## 2022-12-23 VITALS — WEIGHT: 229 LBS | HEIGHT: 68 IN | BODY MASS INDEX: 34.71 KG/M2

## 2022-12-23 DIAGNOSIS — Z12.31 ENCOUNTER FOR SCREENING MAMMOGRAM FOR MALIGNANT NEOPLASM OF BREAST: ICD-10-CM

## 2023-01-26 DIAGNOSIS — F33.9 RECURRENT MAJOR DEPRESSIVE DISORDER, REMISSION STATUS UNSPECIFIED (HCC): Primary | ICD-10-CM

## 2023-01-26 RX ORDER — BUPROPION HYDROCHLORIDE 150 MG/1
150 TABLET ORAL EVERY MORNING
Qty: 30 TABLET | Refills: 5 | Status: SHIPPED | OUTPATIENT
Start: 2023-01-26

## 2023-02-19 DIAGNOSIS — F33.9 RECURRENT MAJOR DEPRESSIVE DISORDER, REMISSION STATUS UNSPECIFIED (HCC): ICD-10-CM

## 2023-02-20 RX ORDER — BUPROPION HYDROCHLORIDE 150 MG/1
TABLET ORAL
Qty: 90 TABLET | Refills: 2 | Status: SHIPPED | OUTPATIENT
Start: 2023-02-20

## 2023-03-24 DIAGNOSIS — F32.A DEPRESSION, UNSPECIFIED DEPRESSION TYPE: ICD-10-CM

## 2023-03-24 RX ORDER — ESCITALOPRAM OXALATE 20 MG/1
20 TABLET ORAL DAILY
Qty: 90 TABLET | Refills: 0 | Status: CANCELLED | OUTPATIENT
Start: 2023-03-24

## 2023-03-28 DIAGNOSIS — F32.A DEPRESSION, UNSPECIFIED DEPRESSION TYPE: ICD-10-CM

## 2023-03-29 RX ORDER — ESCITALOPRAM OXALATE 20 MG/1
20 TABLET ORAL DAILY
Qty: 90 TABLET | Refills: 0 | Status: SHIPPED | OUTPATIENT
Start: 2023-03-29

## 2023-03-31 ENCOUNTER — ANNUAL EXAM (OUTPATIENT)
Dept: OBGYN CLINIC | Facility: CLINIC | Age: 53
End: 2023-03-31

## 2023-03-31 VITALS
HEIGHT: 68 IN | BODY MASS INDEX: 36.98 KG/M2 | DIASTOLIC BLOOD PRESSURE: 72 MMHG | SYSTOLIC BLOOD PRESSURE: 124 MMHG | WEIGHT: 244 LBS

## 2023-03-31 DIAGNOSIS — Z01.419 WOMEN'S ANNUAL ROUTINE GYNECOLOGICAL EXAMINATION: Primary | ICD-10-CM

## 2023-03-31 DIAGNOSIS — Z12.31 ENCOUNTER FOR SCREENING MAMMOGRAM FOR MALIGNANT NEOPLASM OF BREAST: ICD-10-CM

## 2023-03-31 NOTE — PROGRESS NOTES
ASSESSMENT & PLAN:   Diagnoses and all orders for this visit:    Women's annual routine gynecological examination    Encounter for screening mammogram for malignant neoplasm of breast  -     Mammo screening bilateral w 3d & cad; Future          The following were reviewed in today's visit: ASCCP guidelines,  STD testing breast self exam, mammography screening ordered, menopause, exercise and healthy diet  Patient to return to office in yearly for annual exam      All questions have been answered to her satisfaction  CC:  Annual Gynecologic Examination  Chief Complaint   Patient presents with   • Gynecologic Exam     Pt is here for her early exam  Mammo ordered  Pt doing well no concerns at this time  HPI: Tarik Banks is a 47 y o  [id number] who presents for annual gynecologic examination  She has the following concerns:  none      Health Maintenance:    Exercise: intermittently  Breast exams/breast awareness: yes  Diet: well balanced diet  Last mammogram:   Colorectal cancer screenin      Past Medical History:   Diagnosis Date   • Anxiety    • Asthma     no current issues   • Colitis    • Dysfunctional uterine bleeding    • H/O total vaginal hysterectomy 2021   • S/P left oophorectomy 2021       Past Surgical History:   Procedure Laterality Date   • DILATION AND CURETTAGE OF UTERUS     • DILATION AND CURETTAGE OF UTERUS N/A 2017    Procedure: DILATATION AND CURETTAGE (D&C);   Surgeon: Mitul Betts MD;  [facility];  Service:    • ENDOMETRIAL ABLATION N/A 2017    Procedure: Louann Vizcaino;  Surgeon: Mitul Betts MD;  [facility];  Service:    • HYSTERECTOMY  2021   • INDUCED       By Dilation and Evacuation   • OOPHORECTOMY Left 2021    Procedure: John Cheng;  Surgeon: Madison Herring MD;  [facility];  Service: Gynecology   • OR CYSTOURETHROSCOPY N/A 2021    Procedure: Johnna Adame; Surgeon: Dg Sneed MD;  [facility];  Service: Gynecology   • ND LAPS ABD PRTM&OMENTUM DX W/WO SPEC BR/WA SPX N/A 2/8/2021    Procedure: LAPAROSCOPY DIAGNOSTIC;  Surgeon: Dg Sneed MD;  [facility];  Service: Gynecology   • ND VAG HYST 250 GM/< W/RMVL TUBE&/OVARY N/A 2/8/2021    Procedure: HYSTERECTOMY VAGINAL TOTAL (TVH) WITH BILATERAL SALPINGECTOMY;  Surgeon: Dg Sneed MD;  [facility];  Service: Gynecology       Past OB/Gyn History:   Patient's last menstrual period was 12/27/2020 (exact date)  Menopausal status: postmenopausal  Menopausal symptoms: None    Last Pap: not indicated - hysterectomy for benign reasons  History of abnormal Pap smear: no    Patient is currently sexually active     STD testing: no  Current contraception: none      Family History  Family History   Problem Relation Age of Onset   • Hypothyroidism Mother    • Lung cancer Mother 72   • Cancer Mother    • Heart disease Father    • Venous thrombosis Father         of Deep Vessels of Lower Extremity   • Aortic aneurysm Father    • Breast cancer Maternal Aunt 46   • No Known Problems Maternal Aunt    • No Known Problems Maternal Aunt    • Breast cancer Paternal Aunt 48   • Breast cancer Paternal Aunt 46   • No Known Problems Paternal Aunt    • No Known Problems Paternal Aunt    • Esophageal cancer Paternal Uncle 61   • Breast cancer Paternal Grandmother    • Hypertension Paternal Grandfather    • Diabetes Family    • Cancer Family        Family history of uterine or ovarian cancer: no  Family history of breast cancer: no  Family history of colon cancer: no    Social History:  Social History     Socioeconomic History   • Marital status: /Civil Union     Spouse name: Not on file   • Number of children: Not on file   • Years of education: Not on file   • Highest education level: Not on file   Occupational History   • Not on file   Tobacco Use   • Smoking status: Former "Packs/day: 1 00     Years: 15 00     Pack years: 15 00     Types: Cigarettes   • Smokeless tobacco: Never   Vaping Use   • Vaping Use: Never used   Substance and Sexual Activity   • Alcohol use: Yes     Comment: socially   • Drug use: Never   • Sexual activity: Yes     Partners: Male     Birth control/protection: Post-menopausal, Surgical, None   Other Topics Concern   • Not on file   Social History Narrative    Daily coffee consumption , 2 cups/day    Exercise regularly     Social Determinants of Health     Financial Resource Strain: Not on file   Food Insecurity: Not on file   Transportation Needs: Not on file   Physical Activity: Not on file   Stress: Not on file   Social Connections: Not on file   Intimate Partner Violence: Not on file   Housing Stability: Not on file     Domestic violence screen: negative    Allergies:  No Known Allergies    Medications:    Current Outpatient Medications:   •  escitalopram (LEXAPRO) 20 mg tablet, Take 1 tablet (20 mg total) by mouth daily, Disp: 90 tablet, Rfl: 0  •  Magnesium Gluconate (MAGNESIUM 27 PO), Take by mouth, Disp: , Rfl:     Review of Systems:  Review of Systems   Constitutional: Negative  HENT: Negative  Respiratory: Negative  Cardiovascular: Negative  Gastrointestinal: Negative  Genitourinary: Negative  Skin: Negative  Neurological: Negative  Psychiatric/Behavioral: Negative  Physical Exam:  /72 (BP Location: Right arm, Patient Position: Sitting, Cuff Size: Large)   Ht 5' 8\" (1 727 m)   Wt 111 kg (244 lb)   LMP 12/27/2020 (Exact Date)   BMI 37 10 kg/m²    Physical Exam  Constitutional:       Appearance: Normal appearance  Genitourinary:      Bladder and urethral meatus normal       No lesions in the vagina  Right Labia: No rash, tenderness, lesions, skin changes or Bartholin's cyst      Left Labia: No tenderness, lesions, skin changes, Bartholin's cyst or rash  Vaginal cuff intact       No vaginal erythema, " tenderness or bleeding  Mild vaginal atrophy present  Right Adnexa: not tender, not full and no mass present  Left Adnexa: not tender, not full and no mass present  Cervix is absent  Uterus is absent  Urethral meatus caruncle not present  No urethral tenderness or mass present  Breasts:     Right: No swelling, bleeding, inverted nipple, mass, nipple discharge, skin change or tenderness  Left: No swelling, bleeding, inverted nipple, mass, nipple discharge, skin change or tenderness  HENT:      Head: Normocephalic and atraumatic  Eyes:      Extraocular Movements: Extraocular movements intact  Conjunctiva/sclera: Conjunctivae normal       Pupils: Pupils are equal, round, and reactive to light  Cardiovascular:      Rate and Rhythm: Normal rate and regular rhythm  Heart sounds: Normal heart sounds  No murmur heard  Pulmonary:      Effort: Pulmonary effort is normal  No respiratory distress  Breath sounds: Normal breath sounds  No wheezing or rales  Abdominal:      General: There is no distension  Palpations: Abdomen is soft  Tenderness: There is no abdominal tenderness  There is no guarding  Neurological:      General: No focal deficit present  Mental Status: She is alert and oriented to person, place, and time  Skin:     General: Skin is warm and dry  Psychiatric:         Mood and Affect: Mood normal          Behavior: Behavior normal    Vitals and nursing note reviewed

## 2023-06-18 DIAGNOSIS — F32.A DEPRESSION, UNSPECIFIED DEPRESSION TYPE: ICD-10-CM

## 2023-06-19 RX ORDER — ESCITALOPRAM OXALATE 20 MG/1
TABLET ORAL
Qty: 90 TABLET | Refills: 0 | Status: SHIPPED | OUTPATIENT
Start: 2023-06-19

## 2023-07-21 ENCOUNTER — OFFICE VISIT (OUTPATIENT)
Dept: FAMILY MEDICINE CLINIC | Facility: CLINIC | Age: 53
End: 2023-07-21
Payer: COMMERCIAL

## 2023-07-21 VITALS
DIASTOLIC BLOOD PRESSURE: 78 MMHG | SYSTOLIC BLOOD PRESSURE: 110 MMHG | OXYGEN SATURATION: 98 % | HEIGHT: 68 IN | WEIGHT: 243.6 LBS | TEMPERATURE: 98 F | HEART RATE: 87 BPM | BODY MASS INDEX: 36.92 KG/M2

## 2023-07-21 DIAGNOSIS — Z00.00 ANNUAL PHYSICAL EXAM: Primary | ICD-10-CM

## 2023-07-21 DIAGNOSIS — R63.5 WEIGHT GAIN: ICD-10-CM

## 2023-07-21 DIAGNOSIS — G47.00 INSOMNIA, UNSPECIFIED TYPE: ICD-10-CM

## 2023-07-21 DIAGNOSIS — R53.83 OTHER FATIGUE: ICD-10-CM

## 2023-07-21 PROCEDURE — 99214 OFFICE O/P EST MOD 30 MIN: CPT | Performed by: FAMILY MEDICINE

## 2023-07-21 PROCEDURE — 99396 PREV VISIT EST AGE 40-64: CPT | Performed by: FAMILY MEDICINE

## 2023-07-21 RX ORDER — ZOLPIDEM TARTRATE 12.5 MG/1
12.5 TABLET, FILM COATED, EXTENDED RELEASE ORAL
Qty: 30 TABLET | Refills: 0 | Status: SHIPPED | OUTPATIENT
Start: 2023-07-21

## 2023-07-21 NOTE — PROGRESS NOTES
[address] [facility]    NAME: Tori Virgen  AGE: 47 y.o. SEX: female  : 1970     DATE: 2023     Assessment and Plan:     Problem List Items Addressed This Visit    None      Immunizations and preventive care screenings were discussed with patient today. Appropriate education was printed on patient's after visit summary. Counseling:  · {Annual Physical; Counselin}         No follow-ups on file. Chief Complaint:     Chief Complaint   Patient presents with   • Annual Exam      History of Present Illness:     Adult Annual Physical   Patient here for a comprehensive physical exam. The patient reports {problems:34973}. Diet and Physical Activity  · Diet/Nutrition: {annual physical; diet:02100180}. · Exercise: {annual physical; exercise:55636152}. Depression Screening  PHQ-2/9 Depression Screening    Little interest or pleasure in doing things: 1 - several days  Feeling down, depressed, or hopeless: 1 - several days  Trouble falling or staying asleep, or sleeping too much: 1 - several days  Feeling tired or having little energy: 2 - more than half the days  Poor appetite or overeatin - several days  Feeling bad about yourself - or that you are a failure or have let yourself or your family down: 1 - several days  Trouble concentrating on things, such as reading the newspaper or watching television: 1 - several days  Moving or speaking so slowly that other people could have noticed. Or the opposite - being so fidgety or restless that you have been moving around a lot more than usual: 0 - not at all  Thoughts that you would be better off dead, or of hurting yourself in some way: 0 - not at all  PHQ-9 Score: 8   PHQ-9 Interpretation: Mild depression        General Health  · Sleep: {annual physical; sleep:2102}. · Hearing: {annual physical; hearin}.   · Vision: {annual physical; vision:78991941}. · Dental: {annual physical; dental:15562691}. /GYN Health  · Patient is: {Menopause:21655}  · Last menstrual period: ***  · Contraceptive method: {contraceptive options:}. Review of Systems:     Review of Systems   Past Medical History:     Past Medical History:   Diagnosis Date   • Anxiety    • Asthma     no current issues   • Colitis    • Dysfunctional uterine bleeding    • H/O total vaginal hysterectomy 2021   • S/P left oophorectomy 2021      Past Surgical History:     Past Surgical History:   Procedure Laterality Date   • DILATION AND CURETTAGE OF UTERUS     • DILATION AND CURETTAGE OF UTERUS N/A 2017    Procedure: DILATATION AND CURETTAGE (D&C);   Surgeon: Evelyn Busch MD;  [facility];  Service:    • ENDOMETRIAL ABLATION N/A 2017    Procedure: Kenji Cruz;  Surgeon: Evelyn Busch MD;  [facility];  Service:    • HYSTERECTOMY  2021   • INDUCED       By Dilation and Evacuation   • OOPHORECTOMY Left 2021    Procedure: Mamta Adams;  Surgeon: Tammie Bragg MD;  [facility];  Service: Gynecology   • AZ CYSTOURETHROSCOPY N/A 2021    Procedure: Corrine Jones;  Surgeon: Tammie Bragg MD;  [facility];  Service: Gynecology   • AZ LAPS ABD PRTM&OMENTUM DX W/WO SPEC BR/WA [facility] N/A 2021    Procedure: LAPAROSCOPY DIAGNOSTIC;  Surgeon: Tammie Bragg MD;  [facility];  Service: Gynecology   • AZ VAG HYST 250 GM/< W/RMVL TUBE&/OVARY N/A 2021    Procedure: HYSTERECTOMY VAGINAL TOTAL (TVH) WITH BILATERAL SALPINGECTOMY;  Surgeon: Tammie Bragg MD;  [facility];  Service: Gynecology      Social History:     Social History     Socioeconomic History   • Marital status: /Civil Union     Spouse name: None   • Number of children: None   • Years of education: None   • Highest education level: None   Occupational History   • None   Tobacco Use   • Smoking status: Former     Packs/day: 1.00     Years: 15.00     Total pack years: 15.00     Types: Cigarettes   • Smokeless tobacco: Never   Vaping Use   • Vaping Use: Never used   Substance and Sexual Activity   • Alcohol use: Yes     Comment: socially   • Drug use: Never   • Sexual activity: Yes     Partners: Male     Birth control/protection: Post-menopausal, Surgical, None   Other Topics Concern   • None   Social History Narrative    Daily coffee consumption , 2 cups/day    Exercise regularly     Social Determinants of Health     Financial Resource Strain: Not on file   Food Insecurity: Not on file   Transportation Needs: Not on file   Physical Activity: Not on file   Stress: Not on file   Social Connections: Not on file   Intimate Partner Violence: Not on file   Housing Stability: Not on file      Family History:     Family History   Problem Relation Age of Onset   • Hypothyroidism Mother    • Lung cancer Mother 72   • Cancer Mother    • Heart disease Father    • Venous thrombosis Father         of Deep Vessels of Lower Extremity   • Aortic aneurysm Father    • Breast cancer Maternal Aunt 48   • No Known Problems Maternal Aunt    • No Known Problems Maternal Aunt    • Breast cancer Paternal Aunt 48   • Breast cancer Paternal Aunt 48   • No Known Problems Paternal Aunt    • No Known Problems Paternal Aunt    • Esophageal cancer Paternal Uncle 61   • Breast cancer Paternal Grandmother    • Hypertension Paternal Grandfather    • Diabetes Family    • Cancer Family       Current Medications:     Current Outpatient Medications   Medication Sig Dispense Refill   • escitalopram (LEXAPRO) 20 mg tablet TAKE 1 TABLET BY MOUTH EVERY DAY 90 tablet 0   • Magnesium Gluconate (MAGNESIUM 27 PO) Take by mouth       No current facility-administered medications for this visit.       Allergies:     No Known Allergies   Physical Exam:     LMP 12/27/2020 (Exact Date)     Physical Exam     Frances Padilla MA  [facility]

## 2023-07-22 ENCOUNTER — APPOINTMENT (OUTPATIENT)
Dept: LAB | Facility: CLINIC | Age: 53
End: 2023-07-22
Payer: COMMERCIAL

## 2023-07-22 DIAGNOSIS — Z00.00 ANNUAL PHYSICAL EXAM: ICD-10-CM

## 2023-07-22 DIAGNOSIS — R53.83 OTHER FATIGUE: ICD-10-CM

## 2023-07-22 DIAGNOSIS — R63.5 WEIGHT GAIN: ICD-10-CM

## 2023-07-22 LAB
ALBUMIN SERPL BCP-MCNC: 4.3 G/DL (ref 3.5–5)
ALP SERPL-CCNC: 91 U/L (ref 34–104)
ALT SERPL W P-5'-P-CCNC: 21 U/L (ref 7–52)
ANION GAP SERPL CALCULATED.3IONS-SCNC: 6 MMOL/L
AST SERPL W P-5'-P-CCNC: 14 U/L (ref 13–39)
BILIRUB SERPL-MCNC: 0.55 MG/DL (ref 0.2–1)
BUN SERPL-MCNC: 18 MG/DL (ref 5–25)
CALCIUM SERPL-MCNC: 9.9 MG/DL (ref 8.4–10.2)
CHLORIDE SERPL-SCNC: 105 MMOL/L (ref 96–108)
CHOLEST SERPL-MCNC: 218 MG/DL
CO2 SERPL-SCNC: 27 MMOL/L (ref 21–32)
CREAT SERPL-MCNC: 0.92 MG/DL (ref 0.6–1.3)
ERYTHROCYTE [DISTWIDTH] IN BLOOD BY AUTOMATED COUNT: 12.4 % (ref 11.6–15.1)
GFR SERPL CREATININE-BSD FRML MDRD: 71 ML/MIN/1.73SQ M
GLUCOSE P FAST SERPL-MCNC: 104 MG/DL (ref 65–99)
HCT VFR BLD AUTO: 43.6 % (ref 34.8–46.1)
HDLC SERPL-MCNC: 53 MG/DL
HGB BLD-MCNC: 14.6 G/DL (ref 11.5–15.4)
LDLC SERPL CALC-MCNC: 144 MG/DL (ref 0–100)
MCH RBC QN AUTO: 29.6 PG (ref 26.8–34.3)
MCHC RBC AUTO-ENTMCNC: 33.5 G/DL (ref 31.4–37.4)
MCV RBC AUTO: 88 FL (ref 82–98)
NONHDLC SERPL-MCNC: 165 MG/DL
PLATELET # BLD AUTO: 296 THOUSANDS/UL (ref 149–390)
PMV BLD AUTO: 11.7 FL (ref 8.9–12.7)
POTASSIUM SERPL-SCNC: 3.9 MMOL/L (ref 3.5–5.3)
PROT SERPL-MCNC: 7.1 G/DL (ref 6.4–8.4)
RBC # BLD AUTO: 4.94 MILLION/UL (ref 3.81–5.12)
SODIUM SERPL-SCNC: 138 MMOL/L (ref 135–147)
TRIGL SERPL-MCNC: 104 MG/DL
TSH SERPL DL<=0.05 MIU/L-ACNC: 3.67 UIU/ML (ref 0.45–4.5)
WBC # BLD AUTO: 5.96 THOUSAND/UL (ref 4.31–10.16)

## 2023-07-22 PROCEDURE — 84443 ASSAY THYROID STIM HORMONE: CPT

## 2023-07-22 PROCEDURE — 80053 COMPREHEN METABOLIC PANEL: CPT

## 2023-07-22 PROCEDURE — 85027 COMPLETE CBC AUTOMATED: CPT

## 2023-07-22 PROCEDURE — 36415 COLL VENOUS BLD VENIPUNCTURE: CPT

## 2023-07-22 PROCEDURE — 80061 LIPID PANEL: CPT

## 2023-07-26 ENCOUNTER — CLINICAL SUPPORT (OUTPATIENT)
Dept: FAMILY MEDICINE CLINIC | Facility: CLINIC | Age: 53
End: 2023-07-26
Payer: COMMERCIAL

## 2023-07-26 DIAGNOSIS — R73.01 IMPAIRED FASTING GLUCOSE: Primary | ICD-10-CM

## 2023-07-26 LAB — SL AMB POCT HEMOGLOBIN AIC: 5.2 (ref ?–6.5)

## 2023-07-26 PROCEDURE — 83036 HEMOGLOBIN GLYCOSYLATED A1C: CPT

## 2023-07-27 ENCOUNTER — TELEPHONE (OUTPATIENT)
Dept: FAMILY MEDICINE CLINIC | Facility: CLINIC | Age: 53
End: 2023-07-27

## 2023-07-27 DIAGNOSIS — G47.10 HYPERSOMNOLENCE: Primary | ICD-10-CM

## 2023-08-01 ENCOUNTER — TELEPHONE (OUTPATIENT)
Dept: SLEEP CENTER | Facility: CLINIC | Age: 53
End: 2023-08-01

## 2023-08-01 NOTE — TELEPHONE ENCOUNTER
----- Message from Owen Meyer MD sent at 8/1/2023  1:03 PM EDT -----  approved  ----- Message -----  From: Jade Edgar  Sent: 8/1/2023   8:26 AM EDT  To: Sleep Medicine [facility] Provider    This Home sleep study needs approval.     If approved please sign and return to clerical pool. If denied please include reasons why. Also provide alternative testing if warranted. Please sign and return to clerical pool.

## 2023-08-18 ENCOUNTER — HOSPITAL ENCOUNTER (OUTPATIENT)
Dept: SLEEP CENTER | Facility: CLINIC | Age: 53
Discharge: HOME | End: 2023-08-18
Payer: COMMERCIAL

## 2023-08-18 DIAGNOSIS — G47.10 HYPERSOMNOLENCE: ICD-10-CM

## 2023-08-18 PROCEDURE — G0399 HOME SLEEP TEST/TYPE 3 PORTA: HCPCS

## 2023-08-22 DIAGNOSIS — G47.30 SLEEP APNEA, UNSPECIFIED TYPE: Primary | ICD-10-CM

## 2023-08-22 NOTE — PROGRESS NOTES
Home Sleep Study Documentation    HOME STUDY DEVICE: Noxturnal no                                           Maureen G3 yes      Pre-Sleep Home Study:    Set-up and instructions performed by: homestudy tech    Technician performed demonstration for Patient: yes    Return demonstration performed by Patient: yes    Written instructions provided to Patient: yes    Patient signed consent form: yes        Post-Sleep Home Study:    Additional comments by Patient: "I didn't wear my mouthguard last night"     Home Sleep Study Failed:no:    Failure reason: N/A    Reported or Detected: N/A    Scored by: NOE Elizabeth

## 2023-08-23 ENCOUNTER — TELEPHONE (OUTPATIENT)
Dept: SLEEP CENTER | Facility: CLINIC | Age: 53
End: 2023-08-23

## 2023-08-23 NOTE — TELEPHONE ENCOUNTER
----- Message from Robe Salinas MD sent at 8/23/2023  3:04 PM EDT -----  Approved    ----- Message -----  From: Diana Morocho  Sent: 8/23/2023   7:57 AM EDT  To: Sleep Medicine [facility] Provider    This CPAP sleep study needs approval.     If approved please sign and return to clerical pool. If denied please include reasons why. Also provide alternative testing if warranted. Please sign and return to clerical pool.

## 2023-09-01 ENCOUNTER — HOSPITAL ENCOUNTER (OUTPATIENT)
Dept: SLEEP CENTER | Facility: CLINIC | Age: 53
Discharge: HOME | End: 2023-09-01
Payer: COMMERCIAL

## 2023-09-01 DIAGNOSIS — G47.30 SLEEP APNEA, UNSPECIFIED TYPE: ICD-10-CM

## 2023-09-01 PROCEDURE — 95811 POLYSOM 6/>YRS CPAP 4/> PARM: CPT | Performed by: INTERNAL MEDICINE

## 2023-09-01 PROCEDURE — 95811 POLYSOM 6/>YRS CPAP 4/> PARM: CPT

## 2023-09-02 NOTE — PROGRESS NOTES
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  Caffeine use:NO    Alcohol:Dayshift workers after 5PM: Alcohol use:NO    Typical day for patient:YES       Study Documentation    Sleep Study Indications: VENKATA    Sleep Study: Treatment   Optimal PAP pressure: 12  Leak:None  Snore:Eliminated  REM Obtained:yes  Supplemental O2: no    Minimum SaO2 89  Baseline SaO2 95  PAP mask tried (list all)N30  PAP mask choice (final)N30  PAP mask type:nasal  PAP pressure at which snoring was eliminated 12  Minimum SaO2 at final PAP pressure 92  Mode of Therapy:CPAP  ETCO2:No  CPAP changed to BiPAP:No    Mode of Therapy:CPAP    EKG abnormalities: no     EEG abnormalities: no    Sleep Study Recorded < 2 hours: N/A    Sleep Study Recorded > 2 hours but incomplete study: N/A    Sleep Study Recorded 6 hours but no sleep obtained: NO    Patient classification: employed       Post-Sleep Study    Medication used at bedtime or during sleep study:YES over the counter sleep aid and other prescription medications    Patient reports time it took to fall asleep:30 to 60 minutes    Patient reports waking up during study:3 or more times. Patient reports returning to sleep in 10 to 30 minutes. Patient reports sleeping 4 to 6 hours with dreaming. Patient reports sleep during study:typical    Patient rated sleepiness: Very sleepy or tired    PAP treatment:yes: Post PAP treatment patient reports feeling unchanged and would wear PAP mask at home.

## 2023-09-08 LAB

## 2023-09-16 DIAGNOSIS — F32.A DEPRESSION, UNSPECIFIED DEPRESSION TYPE: ICD-10-CM

## 2023-09-17 DIAGNOSIS — F32.A DEPRESSION, UNSPECIFIED DEPRESSION TYPE: ICD-10-CM

## 2023-09-18 RX ORDER — ESCITALOPRAM OXALATE 20 MG/1
TABLET ORAL
Qty: 90 TABLET | Refills: 1 | Status: SHIPPED | OUTPATIENT
Start: 2023-09-18

## 2023-09-18 RX ORDER — ESCITALOPRAM OXALATE 20 MG/1
20 TABLET ORAL DAILY
Qty: 90 TABLET | Refills: 0 | OUTPATIENT
Start: 2023-09-18

## 2023-09-20 LAB

## 2023-09-26 ENCOUNTER — OFFICE VISIT (OUTPATIENT)
Dept: FAMILY MEDICINE CLINIC | Facility: CLINIC | Age: 53
End: 2023-09-26
Payer: COMMERCIAL

## 2023-09-26 VITALS
HEART RATE: 60 BPM | OXYGEN SATURATION: 97 % | WEIGHT: 239 LBS | BODY MASS INDEX: 36.22 KG/M2 | SYSTOLIC BLOOD PRESSURE: 128 MMHG | TEMPERATURE: 97.8 F | DIASTOLIC BLOOD PRESSURE: 80 MMHG | HEIGHT: 68 IN

## 2023-09-26 DIAGNOSIS — M25.562 ACUTE PAIN OF LEFT KNEE: Primary | ICD-10-CM

## 2023-09-26 PROCEDURE — 99213 OFFICE O/P EST LOW 20 MIN: CPT | Performed by: FAMILY MEDICINE

## 2023-09-26 RX ORDER — PREDNISONE 10 MG/1
TABLET ORAL
Qty: 26 TABLET | Refills: 0 | Status: SHIPPED | OUTPATIENT
Start: 2023-09-26

## 2023-09-27 ENCOUNTER — APPOINTMENT (OUTPATIENT)
Dept: RADIOLOGY | Facility: CLINIC | Age: 53
End: 2023-09-27
Payer: COMMERCIAL

## 2023-09-27 DIAGNOSIS — M25.562 ACUTE PAIN OF LEFT KNEE: ICD-10-CM

## 2023-09-27 PROCEDURE — 73562 X-RAY EXAM OF KNEE 3: CPT

## 2023-09-27 NOTE — PROGRESS NOTES
Patient ID: Shanika Bautista is a 47 y.o. female. HPI: 47 y. o.female presents for evaluation of left acute knee pain. She complains of left knee hurting with activity. When she weightbears, she has pain and often when she lies down and has her knee in different positions it causes pain impairing her sleep. She denies any locking but at times it feels like it is going to give way.     SUBJECTIVE    Family History   Problem Relation Age of Onset   • Hypothyroidism Mother    • Lung cancer Mother 72   • Cancer Mother    • Heart disease Father    • Venous thrombosis Father         of Deep Vessels of Lower Extremity   • Aortic aneurysm Father    • Breast cancer Maternal Aunt 46   • No Known Problems Maternal Aunt    • No Known Problems Maternal Aunt    • Breast cancer Paternal Aunt 48   • Breast cancer Paternal Aunt 46   • No Known Problems Paternal Aunt    • No Known Problems Paternal Aunt    • Esophageal cancer Paternal Uncle 61   • Breast cancer Paternal Grandmother    • Hypertension Paternal Grandfather    • Diabetes Family    • Cancer Family      Social History     Socioeconomic History   • Marital status: /Civil Union     Spouse name: Not on file   • Number of children: Not on file   • Years of education: Not on file   • Highest education level: Not on file   Occupational History   • Not on file   Tobacco Use   • Smoking status: Former     Packs/day: 1.00     Years: 15.00     Total pack years: 15.00     Types: Cigarettes   • Smokeless tobacco: Former     Quit date: 1/1/2003   Vaping Use   • Vaping Use: Never used   Substance and Sexual Activity   • Alcohol use: Yes     Comment: socially   • Drug use: Never   • Sexual activity: Yes     Partners: Male     Birth control/protection: Post-menopausal, Surgical, None   Other Topics Concern   • Not on file   Social History Narrative    Daily coffee consumption , 2 cups/day    Exercise regularly     Social Determinants of Health     Financial Resource Strain: Not on file   Food Insecurity: Not on file   Transportation Needs: Not on file   Physical Activity: Not on file   Stress: Not on file   Social Connections: Not on file   Intimate Partner Violence: Not on file   Housing Stability: Not on file     Past Medical History:   Diagnosis Date   • Anxiety    • Asthma     no current issues   • Colitis    • Dysfunctional uterine bleeding    • H/O total vaginal hysterectomy 2021   • S/P left oophorectomy 2021     Past Surgical History:   Procedure Laterality Date   • DILATION AND CURETTAGE OF UTERUS     • DILATION AND CURETTAGE OF UTERUS N/A 2017    Procedure: DILATATION AND CURETTAGE (D&C);   Surgeon: Chandan Avila MD;  [facility];  Service:    • ENDOMETRIAL ABLATION N/A 2017    Procedure: Moise Moreno;  Surgeon: Chandan Avila MD;  [facility];  Service:    • HYSTERECTOMY  2021   • INDUCED       By Dilation and Evacuation   • OOPHORECTOMY Left 2021    Procedure: Lyndon Xie;  Surgeon: Azuecna Dan MD;  [facility];  Service: Gynecology   • PA CYSTOURETHROSCOPY N/A 2021    Procedure: CYSTOSCOPY;  Surgeon: Azucena Dan MD;  [facility];  Service: Gynecology   • PA LAPS ABD PRTM&OMENTUM DX W/WO SPEC BR/WA SPX N/A 2021    Procedure: LAPAROSCOPY DIAGNOSTIC;  Surgeon: Azucena Dan MD;  [facility];  Service: Gynecology   • PA VAG HYST 250 GM/< W/RMVL TUBE&/OVARY N/A 2021    Procedure: HYSTERECTOMY VAGINAL TOTAL (TVH) WITH BILATERAL SALPINGECTOMY;  Surgeon: Azucena Dan MD;  [facility];  Service: Gynecology     No Known Allergies    Current Outpatient Medications:   •  escitalopram (LEXAPRO) 20 mg tablet, TAKE 1 TABLET BY MOUTH EVERY DAY, Disp: 90 tablet, Rfl: 1  •  Magnesium Gluconate (MAGNESIUM 27 PO), Take by mouth, Disp: , Rfl:   •  predniSONE 10 mg tablet, 3 tabs po bid x2 days, then 2 tabs po bid x2 days, then 1 tab bid x2 days, then 1 daily until done., Disp: 26 tablet, Rfl: 0    Review of Systems  Constitutional:     Denies fever, chills ,fatigue ,weakness ,weight loss, weight gain     ENT: Denies earache ,loss of hearing ,nosebleed, nasal discharge,nasal congestion ,sore throat ,hoarseness  Pulmonary: Denies shortness of breath ,cough  ,dyspnea on exertion, orthopnea  ,PND   Cardiovascular:  Denies bradycardia , tachycardia  ,palpations, lower extremity edema leg, claudication  Breast:  Denies new or changing breast lumps ,nipple discharge ,nipple changes  Abdomen:  Denies abdominal pain , anorexia , indigestion, nausea, vomiting, constipation, diarrhea  Musculoskeletal: Denies myalgias, + left knee pain with weightbearing and stiffness as well as a feeling of instability  Gu: denies dysuria, polyuria  Skin: Denies skin rash, skin lesion, skin wound, itching, dry skin  Neuro: Denies headache, numbness, tingling, confusion, loss of consciousness, dizziness, vertigo  Psychiatric: Denies feelings of depression, suicidal ideation, anxiety, sleep disturbances    OBJECTIVE  /80   Pulse 60   Temp 97.8 °F (36.6 °C)   Ht 5' 8" (1.727 m)   Wt 108 kg (239 lb)   LMP 12/27/2020 (Exact Date)   SpO2 97%   BMI 36.34 kg/m²   Constitutional:   NAD, well appearing and well nourished      ENT:   Conjunctiva and lids: no injection, edema, or discharge     Pupils and iris: TREASURE bilaterally    External inspection of ears and nose: normal without deformities or discharge. Otoscopic exam: Canals patent without erythema. Nasal mucosa, septum and turbinates: Normal or edema or discharge         Oropharynx:  Moist mucosa, normal tongue and tonsils without lesions. No erythema        Pulmonary:Respiratory effort normal rate and rhythm, no increased work of breathing.  Auscultation of lungs:  Clear bilaterally with no adventitious breath sounds       Cardiovascular: regular rate and rhythm, S1 and S2, no murmur, no edema and/or varicosities of LE      Abdomen: Soft and non-distended     Positive bowel sounds      No heptomegaly or splenomegaly      Gu: no suprapubic tenderness or CVA tenderness, no urethral discharge  Lymphatic:  No anterior or posterior cervical lymphadenopathy         Musculoskeletal:  Gait and station: Normal gait      Digits and nails normal without clubbing or cyanosis       Inspection/palpation of joints, bones, and muscles: + Left knee crepitance and pain on palpation of the infrapatellar area as well as pain with flexion and extension of the knee with negative anterior and posterior drawer sign and negative meniscal sign. Skin: Normal skin turgor and no rashes      Neuro:    Normal reflexes     Psych:   alert and oriented to person, place and time     normal mood and affect       Assessment/Plan:Diagnoses and all orders for this visit:    Acute pain of left knee  Comments:  Patient is to ice her knee 3 times daily for 20-minute intervals and await x-ray results  Orders:  -     XR knee 3 vw left non injury; Future  -     predniSONE 10 mg tablet; 3 tabs po bid x2 days, then 2 tabs po bid x2 days, then 1 tab bid x2 days, then 1 daily until done. Reviewed with patient plan to treat with above plan.     Patient instructed to call in 72 hours if not feeling better or if symptoms worsen

## 2023-09-28 LAB

## 2023-10-06 DIAGNOSIS — M25.562 ACUTE PAIN OF LEFT KNEE: ICD-10-CM

## 2023-10-06 DIAGNOSIS — M25.569 ACUTE KNEE PAIN, UNSPECIFIED LATERALITY: Primary | ICD-10-CM

## 2023-10-18 ENCOUNTER — EVALUATION (OUTPATIENT)
Dept: PHYSICAL THERAPY | Facility: CLINIC | Age: 53
End: 2023-10-18
Payer: COMMERCIAL

## 2023-10-18 DIAGNOSIS — M25.562 ACUTE PAIN OF LEFT KNEE: ICD-10-CM

## 2023-10-18 PROCEDURE — 97161 PT EVAL LOW COMPLEX 20 MIN: CPT | Performed by: PHYSICAL THERAPIST

## 2023-10-18 NOTE — PROGRESS NOTES
PT Evaluation     Today's date: 10/18/2023  Patient name: Rufino Blanchard  : 1970  MRN: [id number]  Referring provider: No ref. provider found  Dx: No diagnosis found. Assessment  Assessment details: Rufino Blanchard is a 47 y.o. female with L knee pain due to DJD. As well as potential ITB syndrome. She presents with pain, decreased strength, decreased ROM, impaired and ambulatory dysfunction. Due to these impairments, Patient has difficulty performing a/iadls, recreational activities and engaging in social activities. Patient's clinical presentation is consistent with their referring diagnosis. Patient would benefit from skilled physical therapy to address their aforementioned impairments, improve their level of function and to improve their overall quality of life. has been given a home exercise program and is in agreement with the plan of care. Thank you for your referral.     Impairments: abnormal gait, activity intolerance, impaired balance, impaired physical strength, lacks appropriate home exercise program and pain with function    Symptom irritability: lowUnderstanding of Dx/Px/POC: excellent  Goals  ST Goals - 2-4 weeks  1. Patient will report decreased pain with activity by at least 2 points within 4 weeks  2. Patient will improve ROM 5-10 degrees within 4 weeks  3. Patient will demonstrate ability to actively correct posture without cueing within 4 weeks  4. Patient will perform IADLs without pain in 2 weeks  5. Patient will increase strength by 25% in 4 weeks    LT Goals - Discharge  1. Patient will improve FOTO score to maximum stated or greater by discharge  2. Patient will return to preferred recreational activity without significant pain increase by discharge   3.   Patient will return to all work related activities without pain by discharge      Plan  Patient would benefit from: skilled physical therapy  Referral necessary: No  Planned therapy interventions: therapeutic exercise, stretching, strengthening, home exercise program and joint mobilization  Frequency: 2x week  Treatment plan discussed with: patient      Subjective Evaluation    History of Present Illness  Mechanism of injury: Patient reports an approx 3 month history of insidious onset L knee pain. She reports that initially she had a lot of pain with walking, stair climbing and sleeping with touching her knees together. She occasionally took an advil when the pain was worse. She has not had any additional treatment. She did notice knee swelling at the time. 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 She does typically wear heels to work. She typically would walk for exercise but her activity level lately . Recurrent probem    Quality of life: excellent    Patient Goals  Patient goals for therapy: decreased pain, decreased edema, return to sport/leisure activities and increased strength    Pain  Current pain ratin  At best pain ratin  At worst pain ratin  Quality: sharp  Relieving factors: ice  Aggravating factors: walking and stair climbing    Social Support  Steps to enter house: no  Stairs in house: yes   Lives in: [facility]    Employment status: working    Diagnostic Tests  X-ray: abnormal      Objective     Tenderness   Left Knee   Tenderness in the ITB, medial joint line and medial patella.      Active Range of Motion   Left Knee   Flexion: 134 degrees   Extension: 7 degrees   Extensor lag: 3 degrees     Right Knee   Flexion: 134 degrees   Extension: 4 degrees     Strength/Myotome Testing     Left Knee   Flexion: 4  Extension: 4    Additional Strength Details  SLR and knee flexion caused knee pain in the medial and posterior knee    Swelling     Left Knee Girth Measurement (cm)   Joint line: 44.5 cm  10 cm above joint line: 48 cm    Right Knee Girth Measurement (cm)   Joint line: 44 cm  10 cm above joint line: 48.5 cm    Functional Assessment      Squat    Pain and left varus.      Single Leg Stance - Eyes Open   Left  Trial 1: 10 seconds    Right  Trial 1: 10 seconds             Precautions: NA      Manuals 10/18                                                                Neuro Re-Ed                          SLS foam             Side steps                                                                 Ther Ex                          Bike ROM                          SLR 2 x 10            QS 2 x 10 x :05            S/L ABD 2 x 10            clamshells nv            Standing hip abd/ext nv            L ITB stretch 3 x :20                                      Gait Training                                       Modalities

## 2023-10-23 ENCOUNTER — OFFICE VISIT (OUTPATIENT)
Age: 53
End: 2023-10-23
Payer: COMMERCIAL

## 2023-10-23 ENCOUNTER — TELEPHONE (OUTPATIENT)
Dept: SLEEP CENTER | Facility: CLINIC | Age: 53
End: 2023-10-23

## 2023-10-23 VITALS
HEIGHT: 68 IN | DIASTOLIC BLOOD PRESSURE: 72 MMHG | HEART RATE: 72 BPM | BODY MASS INDEX: 36.34 KG/M2 | OXYGEN SATURATION: 98 % | SYSTOLIC BLOOD PRESSURE: 114 MMHG

## 2023-10-23 DIAGNOSIS — G47.19 EXCESSIVE DAYTIME SLEEPINESS: Primary | ICD-10-CM

## 2023-10-23 DIAGNOSIS — R53.83 OTHER FATIGUE: ICD-10-CM

## 2023-10-23 DIAGNOSIS — G47.33 OSA (OBSTRUCTIVE SLEEP APNEA): ICD-10-CM

## 2023-10-23 DIAGNOSIS — F32.A DEPRESSION, UNSPECIFIED DEPRESSION TYPE: ICD-10-CM

## 2023-10-23 PROCEDURE — 99204 OFFICE O/P NEW MOD 45 MIN: CPT | Performed by: INTERNAL MEDICINE

## 2023-10-23 RX ORDER — ESCITALOPRAM OXALATE 20 MG/1
20 TABLET ORAL DAILY
Qty: 90 TABLET | Refills: 1 | Status: SHIPPED | OUTPATIENT
Start: 2023-10-23

## 2023-10-23 NOTE — TELEPHONE ENCOUNTER
Dr. Patricia Deutsch wanted this pt. to have the AT N20 (M) mask. I fit her nose and shipped her one. Rx for this mask requested.

## 2023-10-23 NOTE — LETTER
October 23, 2023     [facility] [address]    Patient: Eden Castillo   YOB: 1970   Date of Visit: 10/23/2023       Dear Dr. Arpit Vernon: Thank you for referring Zaina Lee to me for evaluation. Below are my notes for this consultation. If you have questions, please do not hesitate to call me. I look forward to following your patient along with you. Sincerely,        Josef Dolan DO        CC: No Recipients    Josef Dolan DO  10/23/2023  9:19 AM  Attested  Sleep Consultation   Eden Castillo 47 y.o. female MRN: [id number]    Reason for consultation: Management of mild VENKATA    Requesting physician: Dr. Tiburcio Holden     Assessment/Plan  Eden Castillo is a 78-year-old woman with PMH of mild VENKATA, obesity, HLD, anxiety/depression (escitalopram 20 mg qhs), asthma, and history of TMJ, who presents to the sleep medicine clinic for the management of mild VENKATA. Home sleep study on 8/18/2023 - DIMPLE 10.2. CPAP titration study on 9/1/2023 - optimal CPAP pressure of 12 cm H2O. Patient notes no recent change in weight. Started on CPAP therapy (ordered by PCP) in 9/2023, compliance as below. Residual AHI of 5.31 and 95th percentile leak or 36 L/minute. No change in EDS since she started CPAP therapy. Notes morning dry mouth and mild redness/swelling under her eyes in the AM due to CPAP mask. Ordered to change mask type from a full face mask to nasal mask (Resmed Airtouch N20 (medium)) as she used a nasal mask during her CPAP titration study. Ordered DME pressure change from 12-15 cm H2O to 9-15 cm H2O. Recommend to adjust humidity settings to treat dry mouth. Weight loss in conjunction with PCP. Chronic sleep maintenance insomnia - management of VENKATA as above. Directed to change escitalopram from 20 mg qhs to 20 mg qAM. If continues could consider starting low-dose doxepin.  EDS may be multifactorial - treatment of VENKATA as above and ordered vitamin/nutrient labs (iron profile, vitamin D, and vitamin B12). TSH has been WNL on recent labs. Follow-up in 6 months for CPAP compliance. Mild VENKATA in the setting of EDS  -Home sleep study on 8/18/2023 - DIMPLE 10.2  -CPAP titration study on 9/1/2023 - optimal CPAP pressure of 12 cm H2O  -Patient notes no recent change in weight  -Started on CPAP therapy (ordered by PCP) in 9/2023, compliance as below  -Residual AHI of 5.31  -95th percentile leak or 36 L/minute  -Notes morning dry mouth and mild redness/swelling under her eyes in the AM due to CPAP mask  -Ordered to change mask type from a full face mask to nasal mask (Resmed Airtouch N20 (medium)) as she used a nasal mask during her CPAP titration study  -Ordered DME pressure change from 12-15 cm H2O to 9-15 cm H2O  -Recommend to adjust humidity settings to treat dry mouth  -Weight loss in conjunction with PCP    Chronic sleep maintenance insomnia  -Likely secondary to VENKATA  -Management of VENKATA as above  -Directed to change escitalopram from 20 mg qhs to 20 mg qAM  -If continues could consider starting low-dose doxepin qhs    EDS  -[place] of 13  -No change in EDS since she started CPAP therapy. -May be multifactorial - treatment of VENKATA as above and ordered vitamin/nutrient labs (iron profile, vitamin D, and vitamin B12)  -TSH has been WNL on recent labs    Follow-up in 6 months for compliance    Staffed and seen with Dr. Nuria Gallardo on 10/23/2023    History of Present Illness  Diana Rivera is a 52-year-old woman with PMH of mild VENKATA, obesity, HLD, anxiety/depression (escitalopram 20 mg qhs), asthma, and history of TMJ, who presents to the sleep medicine clinic for the management of mild VENKATA. Unaccompanied in the clinic today. Home sleep study on 8/18/2023 - DIMPLE 10.2. CPAP titration study on 9/1/2023 - optimal CPAP pressure of 12 cm H2O. Patient notes no recent change in weight. Follows with PCP, GI, and OBGYN.  Denies history of stroke/TIA, seizure, head trauma, MI, CAD, arrhythmia, or heart failure. Reviewed with patient her PMH, PSH, medications, allergies, social history, and family history which are as documented. On evaluation, patient is sitting in chair and in no acute distress. Sleep history as below. Evaluated for VENKATA by her PCP due to EDS. Sleep symptoms started in ~2010 and are worsening. No change in EDS while on CPAP. Sleep issues cause her problems with memory and concentration. Currently using a full face mask despite using a nasal mask during her CPAP titration study. Notes SpO2 of ~83% once per night while on CPAP. Recent postnasal drip symptoms over the weekend. Denies SOB, wheezing, cough, chest pain, palpitations, or peripheral edema.     Sleep Schedule and Sleep Hygiene:  Patient reports problems with sleep: snoring and EDS  Work history: Full time,   at a school district   Work hours: [employer] work: denies   Living situation: Lives alone     Bed Time: 9-10 PM  Lights Out: 9-10 PM  Sleep Onset Latency: <15 minutes   Frequency of Night-time Awakenings: ~3-4 times per night for unknown reasons (nocturia) and can have difficulty falling back to sleep (~20-60 minutes, once per night with the other awakenings with a sleep latency of  <15 minutes)  Wake Time: 5-6 AM  Rise Time: 5-6 AM  Days off schedule: Bed time 10-11 PM, sleep latency <15 minutes, and wake/rise time 7-8 AM  Naps: denies   Total Sleep Time: ~6 hours   Prefers to sleep on her side    Sleep medications: denies, prior she used Ambien 12.5 mg qhs in 7/2023 (to establish sleep pattern per PCP, no side effects)  Stimulant medications: denies  Caffeine use: yes, coffee (16-24 oz) per day in the AM (last at 12 PM)  Alcohol use: yes, ~2 beers per week   Tobacco use: denies, quit in 2001 after smoking ~1 PPD since 9950  Illicit drug use: denies     Sleep Disordered Breathing:  Snoring: denies  Witnessed apneas: denies  Shortness of breath or choking/gasping for air: denies  Morning dry mouth: yes with CPAP  Morning headaches: yes  Non-refreshing sleep: yes  Nasal congestion: denies  Rhinorrhea: denies  Nocturia: yes, ~1 time per night     Other sleep related behaviors and symptoms:   Restless leg symptoms: denies  Kicking legs during sleep: denies  Parasomnias: denies  Nightmares: denies, but she has "vivid dreams"  Acid reflux during sleep: denies  Teeth grinding: yes, wears a mouthguard (cannot wear with CPAP)  Sleep paralysis, cataplexy, sleep attacks or hypnagogic or hypnopompic hallucinations: denies  REM Behavioral Sleep Disorder: denies    Daytime Symptoms:   Excessive daytime sleepiness: yes, worst at ~2-3 PM (could takes a nap)  Driving while drowsy: denies  History of motor vehicle accidents or near misses: denies  Cognitive problems: yes, issues with memory and concentration   Mood problems: denies  Problems at work, school or at home: denies    Mask type: Full face mask, Airfit F30i  Mask leaking: denies   Skin irritation from the mask: yes, redness/swelling under eyes in the AM  Pain or discomfort: denies  Feeling of claustrophobia: denies  Aerophagia: denies  Pressure intolerance: denies  Nasal congestion or rhinorrhea: denies  Nasal and oral dryness: yes  Snoring with PAP: denies, unsure    Using PAP every night/day: yes  Using PAP the entire duration of sleep: yes  Benefiting from PAP: denies     [place]:  Sitting and reading: High chance of dozing  Watching TV: Moderate chance of dozing  Sitting, inactive in a public place (e.g. a theatre or a meeting): Slight chance of dozing  As a passenger in a car for an hour without a break:  Moderate chance of dozing  Lying down to rest in the afternoon when circumstances permit: Moderate chance of dozing  Sitting and talking to someone: Slight chance of dozing  Sitting quietly after a lunch without alcohol: Slight chance of dozing  In a car, while stopped for a few minutes in traffic: Slight chance of dozing  Total score: 13    History of tonsillectomy: denies    Social History:  Lives alone  Works at [employer]   Caffeine use: yes, coffee (16-24 oz) per day in the AM (last at 12 PM)  Alcohol use: yes, ~2 beers per week   Tobacco use: denies, quit in  after smoking ~1 PPD since   Illicit drug use: denies     Family History:  Brother - VENKATA on CPAP    Historical Information  Past Medical History:   Diagnosis Date   • Anxiety    • Asthma     no current issues   • Colitis    • Dysfunctional uterine bleeding    • H/O total vaginal hysterectomy 2021   • S/P left oophorectomy 2021     Past Surgical History:   Procedure Laterality Date   • DILATION AND CURETTAGE OF UTERUS     • DILATION AND CURETTAGE OF UTERUS N/A 2017    Procedure: DILATATION AND CURETTAGE (D&C);   Surgeon: Renetta Mg MD;  [facility];  Service:    • ENDOMETRIAL ABLATION N/A 2017    Procedure: Francee Pouch;  Surgeon: Renetta Mg MD;  [facility];  Service:    • HYSTERECTOMY  2021   • INDUCED       By Dilation and Evacuation   • OOPHORECTOMY Left 2021    Procedure: Latoya Hollis;  Surgeon: Simon Lagos MD;  [facility];  Service: Gynecology   • AZ CYSTOURETHROSCOPY N/A 2021    Procedure: Avril Watters;  Surgeon: Simon Lagos MD;  [facility];  Service: Gynecology   • AZ LAPS ABD PRTM&OMENTUM DX W/WO SPEC BR/WA SPX N/A 2021    Procedure: LAPAROSCOPY DIAGNOSTIC;  Surgeon: Simon Lagos MD;  [facility];  Service: Gynecology   • AZ VAG HYST 250 GM/< W/RMVL TUBE&/OVARY N/A 2021    Procedure: HYSTERECTOMY VAGINAL TOTAL (TVH) WITH BILATERAL SALPINGECTOMY;  Surgeon: Simon Lagos MD;  [facility];  Service: Gynecology     Family History   Problem Relation Age of Onset   • Hypothyroidism Mother    • Lung cancer Mother 72   • Cancer Mother    • Heart disease Father    • Venous thrombosis Father         of Deep Vessels of Lower Extremity   • Aortic aneurysm Father    • Breast cancer Maternal Aunt 50   • No Known Problems Maternal Aunt    • No Known Problems Maternal Aunt    • Breast cancer Paternal Aunt 46   • Breast cancer Paternal Aunt 46   • No Known Problems Paternal Aunt    • No Known Problems Paternal Aunt    • Esophageal cancer Paternal Uncle 61   • Breast cancer Paternal Grandmother    • Hypertension Paternal Grandfather    • Diabetes Family    • Cancer Family      Social History     Socioeconomic History   • Marital status: /Civil Union     Spouse name: Not on file   • Number of children: Not on file   • Years of education: Not on file   • Highest education level: Not on file   Occupational History   • Not on file   Tobacco Use   • Smoking status: Former     Packs/day: 1.00     Years: 15.00     Total pack years: 15.00     Types: Cigarettes     Start date: 1986     Quit date: 2001     Years since quittin.2   • Smokeless tobacco: Never   Vaping Use   • Vaping Use: Never used   Substance and Sexual Activity   • Alcohol use: Yes     Alcohol/week: 2.0 standard drinks of alcohol     Types: 2 Cans of beer per week     Comment: socially   • Drug use: Never   • Sexual activity: Not Currently     Partners: Male     Birth control/protection: Post-menopausal, Surgical, None   Other Topics Concern   • Not on file   Social History Narrative    Daily coffee consumption , 2 cups/day    Exercise regularly     Social Determinants of Health     Financial Resource Strain: Not on file   Food Insecurity: Not on file   Transportation Needs: Not on file   Physical Activity: Not on file   Stress: Not on file   Social Connections: Not on file   Intimate Partner Violence: Not on file   Housing Stability: Not on file     Meds/Allergies  No Known Allergies    Home medications:  Prior to Admission medications    Medication Sig Start Date End Date Taking?  Authorizing Provider   escitalopram (LEXAPRO) 20 mg tablet TAKE 1 TABLET BY MOUTH EVERY DAY 9/18/23  Yes Cornelia Farooq DO   Magnesium Gluconate (MAGNESIUM 27 PO) Take by mouth   Yes Historical Provider, MD   predniSONE 10 mg tablet 3 tabs po bid x2 days, then 2 tabs po bid x2 days, then 1 tab bid x2 days, then 1 daily until done. 9/26/23 10/23/23  Rickmiisauro Farooq DO     Visit Vitals  /72 (BP Location: Left arm, Patient Position: Sitting, Cuff Size: Large)   Pulse 72   Ht 5' 8" (1.727 m)   LMP 12/27/2020 (Exact Date)   SpO2 98%   BMI 36.34 kg/m²   OB Status Hysterectomy   Smoking Status Former   BSA 2.2 m²     Physical Exam:  General: Sitting in chair, awake, and alert. No acute distress  HEENT: No craniofacial abnormalities. Mucous membranes, moist, no oral lesions, and normal dentition. Mallampati class - II  NECK: Trachea midline, no accessory muscle use, and no stridor   CARDIAC: Regular rate and rhythm  PULM: CTA bilaterally no wheezing, rhonchi or rales. No conversational dyspnea  EXT: No peripheral edema    NEURO: No focal neurologic deficits, moving all extremities appropriately    Labs: I have personally reviewed pertinent lab results. Lab Results   Component Value Date    WBC 5.96 07/22/2023    HGB 14.6 07/22/2023    HCT 43.6 07/22/2023    MCV 88 07/22/2023     07/22/2023      Lab Results   Component Value Date    CALCIUM 9.9 07/22/2023    K 3.9 07/22/2023    CO2 27 07/22/2023     07/22/2023    BUN 18 07/22/2023    CREATININE 0.92 07/22/2023     No results found for: "IRON", "TIBC", "FERRITIN"  No results found for: "Patricia Courser"  No results found for: "FOLATE"    Sleep studies:  -CPAP titration study on 9/1/2023 - optimal CPAP pressure of 12 cm H2O  -Home sleep study on 8/18/2023 - DIMPLE 10.2    Compliance Report from 9/23/2023-10/22/2023:  Mask type:  Full face mask, Airfit F30i  CPAP type: Resmed (12-15 cm H2O)  Days used >4 hours: 92.31%  95th percentile leak: 36 L/minute  Residual AHI: 5.31    DO Danny Tapia [facility] Fellow  Attestation signed by Lisa Nixon DO at 10/23/2023  9:19 AM:  I have personally seen and examined the patient on 10/23/23 at 9:09 am. I discussed the patient with the  fellow including, but not limited to, verifying findings; reviewing labs and x-rays; discussing with consultants; developing the plan of care with the bedside nurse; and discussing treatment plan with patient or surrogate. I have reviewed the note and assessment performed by the fellow and agree with the fellow's documented findings and plan of care with the following additions/exceptions. Please see my following comments for details and adjustments. Assessment/Plan  47 y.o. F with PMHx of Depression who comes in for management of VENKATA. 1.  Mild VENKATA (AHI - 10.2) - on AutoPAP 12-15 (DME - adapt, Resmed). Good clinical compliance but suboptimal clinical response. Residual AHI - 5.3      -  It appeared she did well at 9-10 cc of H2O pressure during the study. Will optimize therapy by switching to autoPAP 9-15      -  The study was performed on a different mask as well. She had a nasal mask which she was using. Switch back to nasal mask. -  Discussed in depth the results of the sleep study and treatment pitfalls prior to initiation. Answered all questions regarding treatment      -  I also discussed in depth the risk of leaving sleep apnea untreated including hypertension, heart failure, arrhythmia, MI and stroke. 2.  Excessive daytime sleepiness despite adequate compliance with CPAP - this may be related to suboptimal management based on pressures and mask. Will optimize that first.  Will also assess iron panel, vitamin D and B12 as well to ensure there is no other deficiencies. HPI:  Tyler Valerio is a 47 y.o. female with PMHx as below who comes in for management of VENKATA. She was diagnosed by her PCP and started on PAP therapy. She has since been using it but has not had a significant clinical benefit. She still notes sleepiness with previous epworth of 13. She notes difficulty falling asleep, difficulty staying asleep. Prior to CPAP she also had snoring, awakenings with gasping, witnessed apneas, morning headaches, awakenings with dry mouth. she denies symptoms of restless legs. she denies symptoms of cataplexy, sleep paralysis, hypnopompic or hypnagogic hallucinations. Sleep History:  she goes to bed at approximately 9-10, will get to sleep in a few min, will get out of bed at 5-6 am.  she will get up 2-3 times at night for unknown reason. It will then take a few minutes to fall back asleep.   she does/does not nap during he day. Vitals:    10/23/23 0752   BP: 114/72   Pulse: 72   SpO2: 98%   RA  General:  Patient is awake, alert, non-toxic and in no acute respiratory distress  Eyes: PERRL, no scleral icterus  Neck: No JVD  CV:  Regular, +S1 and S2, No murmurs, gallops or rubs appreciated  Lungs: Clear to auscultation bilateral without wheeze, rales or rhonci  Abdomen: Soft, +BS, Non-tender, non-distended  Extremities: No clubbing, cyanosis or edema  Neuro: No focal motor/sensory deficits  Skin: Warm, No rashes or ulcerations  Lab Results   Component Value Date    WBC 5.96 07/22/2023    HGB 14.6 07/22/2023    HCT 43.6 07/22/2023    MCV 88 07/22/2023     07/22/2023     Lab Results   Component Value Date    SODIUM 138 07/22/2023    K 3.9 07/22/2023     07/22/2023    CO2 27 07/22/2023    BUN 18 07/22/2023    CREATININE 0.92 07/22/2023    GLUC 98 08/04/2017    CALCIUM 9.9 07/22/2023     Lab Results   Component Value Date    ALT 21 07/22/2023    AST 14 07/22/2023    ALKPHOS 91 07/22/2023     Sleep studies:  -CPAP titration study on 9/1/2023 - optimal CPAP pressure of 12 cm H2O  -Home sleep study on 8/18/2023 - DIMPLE 10.2     Compliance Report from 9/23/2023-10/22/2023:  Mask type:  Full face mask, Airfit F30i  CPAP type: Resmed (12-15 cm H2O)  Days used >4 hours: 92.31%  95th percentile leak: 36 L/minute  Residual AHI: 5.31

## 2023-10-23 NOTE — PROGRESS NOTES
Sleep Consultation   Eden Castillo 47 y.o. female MRN: [id number]    Reason for consultation: Management of mild VENKATA    Requesting physician: Dr. Jorge Ibrahim     Assessment/Plan  Eden Castillo is a 60-year-old woman with PMH of mild VENKATA, obesity, HLD, anxiety/depression (escitalopram 20 mg qhs), asthma, and history of TMJ, who presents to the sleep medicine clinic for the management of mild VENKATA. Home sleep study on 8/18/2023 - DIMPLE 10.2. CPAP titration study on 9/1/2023 - optimal CPAP pressure of 12 cm H2O. Patient notes no recent change in weight. Started on CPAP therapy (ordered by PCP) in 9/2023, compliance as below. Residual AHI of 5.31 and 95th percentile leak or 36 L/minute. No change in EDS since she started CPAP therapy. Notes morning dry mouth and mild redness/swelling under her eyes in the AM due to CPAP mask. Ordered to change mask type from a full face mask to nasal mask (Resmed Airtouch N20 (medium)) as she used a nasal mask during her CPAP titration study. Ordered DME pressure change from 12-15 cm H2O to 9-15 cm H2O. Recommend to adjust humidity settings to treat dry mouth. Weight loss in conjunction with PCP. Chronic sleep maintenance insomnia - management of VENKATA as above. Directed to change escitalopram from 20 mg qhs to 20 mg qAM. If continues could consider starting low-dose doxepin. EDS may be multifactorial - treatment of VENKATA as above and ordered vitamin/nutrient labs (iron profile, vitamin D, and vitamin B12). TSH has been WNL on recent labs. Follow-up in 6 months for CPAP compliance.      Mild VENKATA in the setting of EDS  -Home sleep study on 8/18/2023 - DIMPLE 10.2  -CPAP titration study on 9/1/2023 - optimal CPAP pressure of 12 cm H2O  -Patient notes no recent change in weight  -Started on CPAP therapy (ordered by PCP) in 9/2023, compliance as below  -Residual AHI of 5.31  -95th percentile leak or 36 L/minute  -Notes morning dry mouth and mild redness/swelling under her eyes in the AM due to CPAP mask  -Ordered to change mask type from a full face mask to nasal mask (Resmed Airtouch N20 (medium)) as she used a nasal mask during her CPAP titration study  -Ordered DME pressure change from 12-15 cm H2O to 9-15 cm H2O  -Recommend to adjust humidity settings to treat dry mouth  -Weight loss in conjunction with PCP    Chronic sleep maintenance insomnia  -Likely secondary to VENKATA  -Management of VENKATA as above  -Directed to change escitalopram from 20 mg qhs to 20 mg qAM  -If continues could consider starting low-dose doxepin qhs    EDS  -[place] of 13  -No change in EDS since she started CPAP therapy. -May be multifactorial - treatment of VENKATA as above and ordered vitamin/nutrient labs (iron profile, vitamin D, and vitamin B12)  -TSH has been WNL on recent labs    Follow-up in 6 months for compliance    Staffed and seen with Dr. William Shaikh on 10/23/2023    History of Present Illness   Rebeca Conte is a 67-year-old woman with PMH of mild VENKATA, obesity, HLD, anxiety/depression (escitalopram 20 mg qhs), asthma, and history of TMJ, who presents to the sleep medicine clinic for the management of mild VENKATA. Unaccompanied in the clinic today. Home sleep study on 8/18/2023 - DIMPLE 10.2. CPAP titration study on 9/1/2023 - optimal CPAP pressure of 12 cm H2O. Patient notes no recent change in weight. Follows with PCP, GI, and OBGYN. Denies history of stroke/TIA, seizure, head trauma, MI, CAD, arrhythmia, or heart failure. Reviewed with patient her PMH, PSH, medications, allergies, social history, and family history which are as documented. On evaluation, patient is sitting in chair and in no acute distress. Sleep history as below. Evaluated for VENKATA by her PCP due to EDS. Sleep symptoms started in ~2010 and are worsening. No change in EDS while on CPAP. Sleep issues cause her problems with memory and concentration. Currently using a full face mask despite using a nasal mask during her CPAP titration study.  Notes SpO2 of ~83% once per night while on CPAP. Recent postnasal drip symptoms over the weekend. Denies SOB, wheezing, cough, chest pain, palpitations, or peripheral edema.     Sleep Schedule and Sleep Hygiene:  Patient reports problems with sleep: snoring and EDS  Work history: Full time,   at a school district   Work hours: Dayshift   Shift work: denies   Living situation: Lives alone     Bed Time: 9-10 PM  Lights Out: 9-10 PM  Sleep Onset Latency: <15 minutes   Frequency of Night-time Awakenings: ~3-4 times per night for unknown reasons (nocturia) and can have difficulty falling back to sleep (~20-60 minutes, once per night with the other awakenings with a sleep latency of  <15 minutes)  Wake Time: 5-6 AM  Rise Time: 5-6 AM  Days off schedule: Bed time 10-11 PM, sleep latency <15 minutes, and wake/rise time 7-8 AM  Naps: denies   Total Sleep Time: ~6 hours   Prefers to sleep on her side    Sleep medications: denies, prior she used Ambien 12.5 mg qhs in 7/2023 (to establish sleep pattern per PCP, no side effects)  Stimulant medications: denies  Caffeine use: yes, coffee (16-24 oz) per day in the AM (last at 12 PM)  Alcohol use: yes, ~2 beers per week   Tobacco use: denies, quit in 2001 after smoking ~1 PPD since 7121  Illicit drug use: denies     Sleep Disordered Breathing:  Snoring: denies  Witnessed apneas: denies  Shortness of breath or choking/gasping for air: denies  Morning dry mouth: yes with CPAP  Morning headaches: yes  Non-refreshing sleep: yes  Nasal congestion: denies  Rhinorrhea: denies  Nocturia: yes, ~1 time per night     Other sleep related behaviors and symptoms:   Restless leg symptoms: denies  Kicking legs during sleep: denies  Parasomnias: denies  Nightmares: denies, but she has "vivid dreams"  Acid reflux during sleep: denies  Teeth grinding: yes, wears a mouthguard (cannot wear with CPAP)  Sleep paralysis, cataplexy, sleep attacks or hypnagogic or hypnopompic hallucinations: denies  REM Behavioral Sleep Disorder: denies    Daytime Symptoms:   Excessive daytime sleepiness: yes, worst at ~2-3 PM (could takes a nap)  Driving while drowsy: denies  History of motor vehicle accidents or near misses: denies  Cognitive problems: yes, issues with memory and concentration   Mood problems: denies  Problems at work, school or at home: denies    Mask type: Full face mask, Airfit F30i  Mask leaking: denies   Skin irritation from the mask: yes, redness/swelling under eyes in the AM  Pain or discomfort: denies  Feeling of claustrophobia: denies  Aerophagia: denies  Pressure intolerance: denies  Nasal congestion or rhinorrhea: denies  Nasal and oral dryness: yes  Snoring with PAP: denies, unsure    Using PAP every night/day: yes  Using PAP the entire duration of sleep: yes  Benefiting from PAP: denies     [place]:  Sitting and reading: High chance of dozing  Watching TV: Moderate chance of dozing  Sitting, inactive in a public place (e.g. a theatre or a meeting): Slight chance of dozing  As a passenger in a car for an hour without a break:  Moderate chance of dozing  Lying down to rest in the afternoon when circumstances permit: Moderate chance of dozing  Sitting and talking to someone: Slight chance of dozing  Sitting quietly after a lunch without alcohol: Slight chance of dozing  In a car, while stopped for a few minutes in traffic: Slight chance of dozing  Total score: 13    History of tonsillectomy: denies    Social History:  Lives alone  Works at [employer]   Caffeine use: yes, coffee (16-24 oz) per day in the AM (last at 12 PM)  Alcohol use: yes, ~2 beers per week   Tobacco use: denies, quit in 2001 after smoking ~1 PPD since 0274  Illicit drug use: denies     Family History:  Brother - VENKATA on CPAP    Historical Information   Past Medical History:   Diagnosis Date    Anxiety     Asthma     no current issues    Colitis     Dysfunctional uterine bleeding     H/O total vaginal hysterectomy 2/19/2021    S/P left oophorectomy 2021     Past Surgical History:   Procedure Laterality Date    DILATION AND CURETTAGE OF UTERUS      DILATION AND CURETTAGE OF UTERUS N/A 2017    Procedure: DILATATION AND CURETTAGE (D&C);   Surgeon: Daphney Dupont MD;  [facility];  Service:     ENDOMETRIAL ABLATION N/A 2017    Procedure: Ericka Dumas;  Surgeon: Daphney Dupont MD;  [facility];  Service:     HYSTERECTOMY  2021    INDUCED       By Dilation and Evacuation    OOPHORECTOMY Left 2021    Procedure: Yael Barrett;  Surgeon: Juan Pablo Brennan MD;  [facility];  Service: Gynecology    MS CYSTOURETHROSCOPY N/A 2021    Procedure: [place];  Surgeon: Juan Pablo Brennan MD;  [facility];  Service: Gynecology    MS LAPS ABD PRTM&OMENTUM DX W/WO [facility] BR/WA [facility] N/A 2021    Procedure: LAPAROSCOPY DIAGNOSTIC;  Surgeon: Juan Pablo Brennan MD;  [facility];  Service: Gynecology    MS VAG HYST 250 GM/< W/RMVL TUBE&/OVARY N/A 2021    Procedure: HYSTERECTOMY VAGINAL TOTAL (TVH) WITH BILATERAL SALPINGECTOMY;  Surgeon: Juan Pablo Brennan MD;  [facility];  Service: Gynecology     Family History   Problem Relation Age of Onset    Hypothyroidism Mother     Lung cancer Mother 72    Cancer Mother     Heart disease Father     Venous thrombosis Father         of Deep Vessels of Lower Extremity    Aortic aneurysm Father     Breast cancer Maternal Aunt 48    No Known Problems Maternal Aunt     No Known Problems Maternal Aunt     Breast cancer Paternal Aunt 48    Breast cancer Paternal Aunt 48    No Known Problems Paternal Aunt     No Known Problems Paternal Aunt     Esophageal cancer Paternal Uncle 61    Breast cancer Paternal Grandmother     Hypertension Paternal Grandfather     Diabetes Family     Cancer Family      Social History     Socioeconomic History    Marital status: /Civil Union     Spouse name: Not on file Number of children: Not on file    Years of education: Not on file    Highest education level: Not on file   Occupational History    Not on file   Tobacco Use    Smoking status: Former     Packs/day: 1.00     Years: 15.00     Total pack years: 15.00     Types: Cigarettes     Start date: 1986     Quit date: 2001     Years since quittin.2    Smokeless tobacco: Never   Vaping Use    Vaping Use: Never used   Substance and Sexual Activity    Alcohol use: Yes     Alcohol/week: 2.0 standard drinks of alcohol     Types: 2 Cans of beer per week     Comment: socially    Drug use: Never    Sexual activity: Not Currently     Partners: Male     Birth control/protection: Post-menopausal, Surgical, None   Other Topics Concern    Not on file   Social History Narrative    Daily coffee consumption , 2 cups/day    Exercise regularly     Social Determinants of Health     Financial Resource Strain: Not on file   Food Insecurity: Not on file   Transportation Needs: Not on file   Physical Activity: Not on file   Stress: Not on file   Social Connections: Not on file   Intimate Partner Violence: Not on file   Housing Stability: Not on file     Meds/Allergies   No Known Allergies    Home medications:  Prior to Admission medications    Medication Sig Start Date End Date Taking? Authorizing Provider   escitalopram (LEXAPRO) 20 mg tablet TAKE 1 TABLET BY MOUTH EVERY DAY 23  Yes Cornelia Farooq DO   Magnesium Gluconate (MAGNESIUM 27 PO) Take by mouth   Yes Historical Provider, MD   predniSONE 10 mg tablet 3 tabs po bid x2 days, then 2 tabs po bid x2 days, then 1 tab bid x2 days, then 1 daily until done.  9/26/23 10/23/23  Genedayanna Farooq DO     Visit Vitals  /72 (BP Location: Left arm, Patient Position: Sitting, Cuff Size: Large)   Pulse 72   Ht 5' 8" (1.727 m)   LMP 2020 (Exact Date)   SpO2 98%   BMI 36.34 kg/m²   OB Status Hysterectomy   Smoking Status Former   BSA 2.2 m²     Physical Exam:  General: Sitting in chair, awake, and alert. No acute distress  HEENT: No craniofacial abnormalities. Mucous membranes, moist, no oral lesions, and normal dentition. Mallampati class - II  NECK: Trachea midline, no accessory muscle use, and no stridor   CARDIAC: Regular rate and rhythm  PULM: CTA bilaterally no wheezing, rhonchi or rales. No conversational dyspnea  EXT: No peripheral edema    NEURO: No focal neurologic deficits, moving all extremities appropriately    Labs: I have personally reviewed pertinent lab results. Lab Results   Component Value Date    WBC 5.96 07/22/2023    HGB 14.6 07/22/2023    HCT 43.6 07/22/2023    MCV 88 07/22/2023     07/22/2023      Lab Results   Component Value Date    CALCIUM 9.9 07/22/2023    K 3.9 07/22/2023    CO2 27 07/22/2023     07/22/2023    BUN 18 07/22/2023    CREATININE 0.92 07/22/2023     No results found for: "IRON", "TIBC", "FERRITIN"  No results found for: "Magda Erickson"  No results found for: "FOLATE"    Sleep studies:  -CPAP titration study on 9/1/2023 - optimal CPAP pressure of 12 cm H2O  -Home sleep study on 8/18/2023 - DIMPLE 10.2    Compliance Report from 9/23/2023-10/22/2023:  Mask type:  Full face mask, Airfit F30i  CPAP type: Resmed (12-15 cm H2O)  Days used >4 hours: 92.31%  95th percentile leak: 36 L/minute  Residual AHI: 5.31    DO [facility] Sleep [facility] 09-Jun-2021 20:50

## 2023-10-23 NOTE — TELEPHONE ENCOUNTER
Reason for call:   [x] Refill   [] Prior Auth  [] Other:     Office:   [x] PCP/Provider - Florencio Hartmann, DO   [] Specialty/Provider -     Medication:     escitalopram (LEXAPRO)        Dose/Frequency: 20mg daily     Quantity: 90    Pharmacy: [address] [place] [address]     Does the patient have enough for 3 days?    [x] Yes   [] No - Send as HP to PO      Patient has changed pharmacies

## 2023-10-23 NOTE — PATIENT INSTRUCTIONS
Nursing Support:  When: Monday through Friday 7A-5PM except holidays  Where: Our direct line is [phone]. If you are having a true emergency please call 911. In the event that the line is busy or it is after hours please leave a voice message and we will return your call. Please speak clearly, leaving your full name, birth date, best number to reach you and the reason for your call. Medication refills: We will need the name of the medication, the dosage, the ordering provider, whether you get a 30 or 90 day refill, and the pharmacy name and address. Medications will be ordered by the provider only. Nurses cannot call in prescriptions. Please allow 7 days for medication refills. Physician requested updates: If your provider requested that you call with an update after starting medication, please be ready to provide us the medication and dosage, what time you take your medication, the time you attempt to fall asleep, time you fall asleep, when you wake up, and what time you get out of bed. Sleep Study Results: We will contact you with sleep study results and/or next steps after the physician has reviewed your testing. CPAP MAINTENANCE AND MANAGEMENT    1. Continue use of CPAP equipment nightly - use any time you are laying down to rest, watch TV, etc to increase use and in case you fall asleep to prevent falling asleep without it  2. If air is too hot, turn down the tubing heating setting  3. If excessive dry mouth in the morning, turn up humidifier setting and be sure to only use distilled water in your humidifier. You can also turn down the tubing heating setting  4. Change your filter regularly and wash the non-disposable filter  5. Continue to clean your equipment, as discussed, using mild soap and water. You can use unscented baby wipe on the mask.   6.  Contact the [facility] with any questions or concerns prior to your next visit, as needed    HOW TO CLEAN YOUR AUTO CPAP MACHINE    Mask: Clean the cushion of your mask daily. Dish soap and warm water. (Usually eligible for mask cushions every 3 months)  2. Headgear: Once a week. I find when you wash it daily it eats the material of the Velcro faster.  (Usually eligible for new headgear every 6 months)  3. Heated Tubing: Clean the tube every 3-7 days. One drop of dish soap run warm water through the tube then hang it over your shower curtain and let it drip dry. (Usually eligible every 3 months)  4. Humidifier: Rinse out every 1-3 days. Dish soap warm water or , top shelf. (eligible every 6 months) **DISTILLED WATER ONLY**  5. Filters:  Dark blue (reusable for 6 months)- Rinse under warm water (no soap) sit and drip dry every 2-3 weeks. (eligible for a new one every 6 months)  Light Blue (disposable) throw away every 2-4 weeks depending on how dirty it looks. (eligible for 6 every 3 months)    Check with your insurance and durable medical equipment company regarding supply replacements. Nursing Support:  When: Monday through Friday 7A-5PM except holidays  Where: Our direct line is [phone]. If you are having a true emergency please call 911. In the event that the line is busy or it is after hours please leave a voice message and we will return your call. Please speak clearly, leaving your full name, birth date, best number to reach you and the reason for your call. Medication refills: We will need the name of the medication, the dosage, the ordering provider, whether you get a 30 or 90 day refill, and the pharmacy name and address. Medications will be ordered by the provider only. Nurses cannot call in prescriptions. Please allow 7 days for medication refills. Physician requested updates:  If your provider requested that you call with an update after starting medication, please be ready to provide us the medication and dosage, what time you take your medication, the time you attempt to fall asleep, time you fall asleep, when you wake up, and what time you get out of bed. Sleep Study Results: We will contact you with sleep study results and/or next steps after the physician has reviewed your testing. Usually one of our nurses will call to talk about the results within 1-2 weeks of testing. Strategies for improving sleep:    Keep a consistent bedtime and wake up time. This will lead to a more regular sleep schedule and avoid periods of sleep deprivation or periods of extended wakefulness during the night. Sleep in a colder environment. Bedroom temperatures may need to be below 70 degrees Fahrenheit to help with decreasing body temperature. The brain usually calms in colder temperatures. Taking a hot shower/bath before bedtime may help with decreasing internal body temperature. Avoid watching TV in bed. If needing a form of media to help with sleep - can try sleep aid podcasts such Sleep With Me - a free podcast that helps with sleep initiation    Avoid napping, especially naps lasting longer than 1 hour or naps late in the day, which will likely affect your ability to fall asleep that night. Limit caffeine/sugar, avoiding caffeine after lunch to allow it to get out of your system and not affect your ability to fall asleep or the quality of your sleep. I usually recommend avoiding caffeine/sugar at least 6 hours before bedstime    Limit alcohol, alcohol can be sedating but also activating as it metabolizes, causing you to awaken from sleep earlier than desired. It can affect the quality of your sleep by not letting you get into the more refreshing stages of sleep. Avoid nicotine, of course not smoking or vaping at all is best, but nicotine is a stimulant and should be avoided near bedtime and during the night    Exercise and daytime physical activity is encouraged, in particular 4-6 hours before bedtime, as this may help you to fall asleep more easily and quality of sleep is improved. Rigorous exercise within 3 hours of bedtime is discouraged. Keep the sleep environment quiet and dark - Noise and light exposure during the night can disrupt sleep. White noise or ear plugs are often recommended to reduce noise. Using black out shades or an eye mask is commonly recommended to reduce light. This also includes avoiding exposure to television or technology near bedtime, as this can have an impact on circadian rhythms by shifting sleep time later. Bedroom clock - Avoid checking the time at night  This includes alarm clocks and other time pieces such as watches and phones. Checking the time increases cognitive arousal and prolongs wakefulness. Evening eating - Avoid eating close to bedtime which can cause acid reflux and unwanted weight gain, but don't go to bed hungry. Eat a healthy and filling meal in the evening without over-eating and avoid late night snacks. Be mindful of your medications - some medications if taken closer to bedtime can cause insomnia. These medications include Effexor, Cymbalta, Wellbutrin, Metoprolol, Albuterol and others. Of course, medications are important for other medical issues so a discussion needs to be had with your prescribing physician before changing medications on your own. Avoid excessive time awake in bed:  Before going to bed, decreasing stressful thoughts is key to quieting the brain. That's easier than done. Some strategies to decrease these thoughts include a typical wind down routine (reading a book, hot shower/bath, calming TV show in the living room etc.), specified worry time (writing down your worries in a journal 2-3 hours before bed), progressive muscle relaxation ([employer] has some good tutorials on insomnia muscle relaxation), meditation.       With great difficulty falling asleep or with difficulty falling back asleep, laying in bed for a prolonged period time is not ideal.  This can increase worry and rumination (having racing thoughts, not able to "turn off your brain". During the first part of the night - avoid going to bed unless you are drowsy. Sometimes we go to bed because we feel like it's the "right time" but our brains aren't ready for sleeping. This may paradoxically lead to more insomnia as you stay awake in bed waiting to go to sleep. If you are able to fall asleep under 30 minutes of closing your eyes with the lights off, that is reasonable. If not, then maybe you need to get out of bed. After what feels like 30 minutes, if you feel wide awake, worried, anxious, or can't clear your brain, it is better to leave the bedroom to do something relaxing , The typical recommendation is to read a boring book in dim light. In general, if you leave the room, you want to do something that is relaxing, not stimulating. Avoid bright screens, doing work, doing chores, or anything that requires a lot of mental effort. Return to bed when you feel more calm, sleepy, or mentally clear. It is common in insomnia to look at the time at night to see what time it is, how long you have been awake, etc.  However, this can negatively affect sleep. I strongly recommend avoiding looking at the time at night. Here are some ways to do this  1) Turn the clock around so you cannot see the time at night  2) Avoid wearing a watch to bed. 3) Leave your phone on the other side of the room so you cannot look at it at night  4) Set an alarm if you need to wake up in he morning. If you have not heard the alarm, assume it is still time to sleep. If you practice this consistently, sleep quality and anxiety regarding sleep may improve. There are some on-line resources that do require a fee that can be of help. Some of which will require a fee. https://ruiz.info/. [place].gov/apps/insomnia/index.html#dashboard (FREE)  Http://[employer]/cbt-online-insomnia-treatment.html  IndoorTheaters.si    Some apps that have helped people sleep:  Insomnia  (FREE)  Calm  CBT-I   Go! To Sleep by the [facility] [employer]     It is very important to avoid driving while drowsy, this can be very dangerous or even cause serious injury or death. If sleepy, it is not safe to get behind the wheel. If you are driving and feels sleepy, it is very important to pull over right away. Even losing control of the car for a split second can be deadly. If you feel you cannot control when sleepiness occurs and cannot prevented, it is important to not drive at all until this improves. Please let me know if you experience this as it is very important.

## 2023-10-24 ENCOUNTER — TELEPHONE (OUTPATIENT)
Dept: SLEEP CENTER | Facility: CLINIC | Age: 53
End: 2023-10-24

## 2023-10-24 DIAGNOSIS — F32.5 MAJOR DEPRESSIVE DISORDER WITH SINGLE EPISODE, IN FULL REMISSION (HCC): Primary | ICD-10-CM

## 2023-10-24 RX ORDER — ESCITALOPRAM OXALATE 20 MG/1
20 TABLET ORAL DAILY
Qty: 10 TABLET | Refills: 0 | OUTPATIENT
Start: 2023-10-24

## 2023-10-24 RX ORDER — ESCITALOPRAM OXALATE 20 MG/1
20 TABLET ORAL DAILY
Qty: 10 TABLET | Refills: 0 | Status: SHIPPED | OUTPATIENT
Start: 2023-10-24 | End: 2023-11-03

## 2023-10-24 NOTE — TELEPHONE ENCOUNTER
Patient stated her prescription insurance changed and now has mail order meds. Needs 10 pills of the escitalopram (LEXAPRO) 20 mg tablet [[id number]] until mail order meds are delivered. For today please send med to :    [facility]  [address] [facility]            Please contact patient and advise. Thank you for your help.

## 2023-10-25 ENCOUNTER — APPOINTMENT (OUTPATIENT)
Dept: PHYSICAL THERAPY | Facility: CLINIC | Age: 53
End: 2023-10-25
Payer: COMMERCIAL

## 2023-10-25 LAB

## 2023-10-30 ENCOUNTER — OFFICE VISIT (OUTPATIENT)
Dept: PHYSICAL THERAPY | Facility: CLINIC | Age: 53
End: 2023-10-30
Payer: COMMERCIAL

## 2023-10-30 DIAGNOSIS — M25.562 ACUTE PAIN OF LEFT KNEE: Primary | ICD-10-CM

## 2023-10-30 PROCEDURE — 97110 THERAPEUTIC EXERCISES: CPT | Performed by: PHYSICAL THERAPIST

## 2023-10-30 NOTE — PROGRESS NOTES
Daily Note     Today's date: 10/30/2023  Patient name: Le Felipe  : 1970  MRN: [id number]  Referring provider: DACIA Cr  Dx:   Encounter Diagnosis     ICD-10-CM    1. Acute pain of left knee  M25.562                      Subjective: Patient reports having soreness in the quad when initiating the HEP. Objective: See treatment diary below      Assessment: Tolerated treatment well. Patient would benefit from continued PT      Plan: Continue per plan of care. Precautions: NA  Access Code: [email or web address]  URL: [email or web address]  Date: 10/30/2023  Prepared by: Shelia Dunham    Exercises  - Straight Leg Raise  - 1 x daily - 3 x weekly - 3 sets - 10 reps - 3 hold  - Supine Quad Set  - 1 x daily - 7 x weekly - 3 sets - 10 reps - 5 hold  - Sidelying Hip Abduction  - 1 x daily - 3 x weekly - 3 sets - 10 reps - 3 hold  - Supine ITB Stretch with Strap  - 2 x daily - 7 x weekly - 1 sets - 3 reps - 20 hold  - Standing Hip Abduction with Counter Support  - 1 x daily - 3 x weekly - 3 sets - 10 reps  - Clamshell  - 1 x daily - 3 x weekly - 3 sets - 10 reps  - Single Leg Stance with Arms Out  - 1 x daily - 7 x weekly - 1 sets - 10 reps - 10 hold    Manuals 10/18 10/30           S/l ITB st  5'                                                  Neuro Re-Ed                          SLS foam  10 x :10           Side steps  Ytb x 3 laps                                                               Ther Ex                          Bike ROM  6                        SLR 2 x 10 2# 2 x 10           QS 2 x 10 x :05 hep           S/L ABD 2 x 10 2x 10           clamshells nv            Standing hip abd/ext nv 3# 2 x 10           L ITB stretch 3 x :20 3 x :20                                     Gait Training                                       Modalities

## 2023-11-08 ENCOUNTER — OFFICE VISIT (OUTPATIENT)
Dept: PHYSICAL THERAPY | Facility: CLINIC | Age: 53
End: 2023-11-08
Payer: COMMERCIAL

## 2023-11-08 DIAGNOSIS — M25.562 ACUTE PAIN OF LEFT KNEE: Primary | ICD-10-CM

## 2023-11-08 PROCEDURE — 97110 THERAPEUTIC EXERCISES: CPT | Performed by: PHYSICAL THERAPIST

## 2023-11-08 NOTE — PROGRESS NOTES
Daily Note     Today's date: 2023  Patient name: Mary Grace Mccauley  : 1970  MRN: [id number]  Referring provider: DACIA Curtis  Dx: No diagnosis found. Subjective: Patient reports that her knee is feeling well. She notes stiffness in the AM and after prolonged sitting but otherwise is diong well. Objective: See treatment diary below      Assessment: Tolerated treatment well. Patient would benefit from continued PT      Plan: Continue per plan of care. Precautions: NA  Access Code: [email or web address]  URL: [email or web address]  Date: 10/30/2023  Prepared by: Dea Stewart    Exercises  - Straight Leg Raise  - 1 x daily - 3 x weekly - 3 sets - 10 reps - 3 hold  - Supine Quad Set  - 1 x daily - 7 x weekly - 3 sets - 10 reps - 5 hold  - Sidelying Hip Abduction  - 1 x daily - 3 x weekly - 3 sets - 10 reps - 3 hold  - Supine ITB Stretch with Strap  - 2 x daily - 7 x weekly - 1 sets - 3 reps - 20 hold  - Standing Hip Abduction with Counter Support  - 1 x daily - 3 x weekly - 3 sets - 10 reps  - Clamshell  - 1 x daily - 3 x weekly - 3 sets - 10 reps  - Single Leg Stance with Arms Out  - 1 x daily - 7 x weekly - 1 sets - 10 reps - 10 hold    Manuals 10/18 10/30 11/8          S/l ITB st  5'                                                  Neuro Re-Ed                          SLS foam  10 x :10 10 x :10          Side steps  Ytb x 3 laps np                                                              Ther Ex                          Bike ROM  6 6'                       SLR 2 x 10 2# 2 x 10 2# 2x10          QS 2 x 10 x :05 hep           S/L ABD 2 x 10 2x 10 2# 2 x 10          clamshells nv  Ytb x 20          Standing hip abd/ext nv 3# 2 x 10 3# 2 x 10          L ITB stretch 3 x :20 3 x :20 3 x :20          TKE   Ytb x 20                       Gait Training                                       Modalities

## 2023-11-12 ENCOUNTER — APPOINTMENT (OUTPATIENT)
Dept: LAB | Facility: CLINIC | Age: 53
End: 2023-11-12
Payer: COMMERCIAL

## 2023-11-12 DIAGNOSIS — G47.19 EXCESSIVE DAYTIME SLEEPINESS: ICD-10-CM

## 2023-11-12 DIAGNOSIS — R53.83 OTHER FATIGUE: ICD-10-CM

## 2023-11-12 LAB
25(OH)D3 SERPL-MCNC: 44.9 NG/ML (ref 30–100)
FERRITIN SERPL-MCNC: 83 NG/ML (ref 11–307)
IRON SATN MFR SERPL: 37 % (ref 15–50)
IRON SERPL-MCNC: 129 UG/DL (ref 50–212)
TIBC SERPL-MCNC: 346 UG/DL (ref 250–450)
UIBC SERPL-MCNC: 217 UG/DL (ref 155–355)
VIT B12 SERPL-MCNC: 187 PG/ML (ref 180–914)

## 2023-11-12 PROCEDURE — 82728 ASSAY OF FERRITIN: CPT

## 2023-11-12 PROCEDURE — 36415 COLL VENOUS BLD VENIPUNCTURE: CPT

## 2023-11-12 PROCEDURE — 83540 ASSAY OF IRON: CPT

## 2023-11-12 PROCEDURE — 82306 VITAMIN D 25 HYDROXY: CPT

## 2023-11-12 PROCEDURE — 83550 IRON BINDING TEST: CPT

## 2023-11-12 PROCEDURE — 82607 VITAMIN B-12: CPT

## 2023-11-14 ENCOUNTER — PATIENT MESSAGE (OUTPATIENT)
Dept: PHYSICAL THERAPY | Facility: CLINIC | Age: 53
End: 2023-11-14

## 2023-11-15 ENCOUNTER — APPOINTMENT (OUTPATIENT)
Dept: PHYSICAL THERAPY | Facility: CLINIC | Age: 53
End: 2023-11-15
Payer: COMMERCIAL

## 2023-11-27 ENCOUNTER — TELEPHONE (OUTPATIENT)
Age: 53
End: 2023-11-27

## 2023-11-27 NOTE — TELEPHONE ENCOUNTER
Called patient at ~2:40 PM on 11/27/2023. Reviewed with patient her recent lab work. Iron profile and vitamin D level were normal. Discussed that vitamin B12 of 187 is borderline low (lower limit of normal is 180) and recommended OTC vitamin B12 supplementation (500 to 1000 mcg per day). Target vitamin B12 level is >400 and will reevaluate serum vitamin B12 level after 6 months of supplementation. Reminded patient of her upcoming sleep medicine appointment on 4/1/2024.      Angela Cruz  Sleep Medicine Fellow Trinity Lin is a 56 year old female presenting with burning with urination, blood on her panties, back pain x 3 days.    Tx/OTC medications tried: None    Denies known Latex allergy or symptoms of latex sensitivity.  Medications reviewed with patient:No changes made.  PCP verified  Pharmacy verified

## 2023-12-03 ENCOUNTER — NURSE TRIAGE (OUTPATIENT)
Dept: OTHER | Facility: OTHER | Age: 53
End: 2023-12-03

## 2023-12-03 NOTE — TELEPHONE ENCOUNTER
Reason for Disposition  • Wheezing is present    Answer Assessment - Initial Assessment Questions  1. ONSET: "When did the cough begin?"       Thursday    2. SEVERITY: "How bad is the cough today?"       Productive yellow cough causing sore throat and hurts to swallow    3. SPUTUM: "Describe the color of your sputum" (none, dry cough; clear, white, yellow, green)      Yellow    4. HEMOPTYSIS: "Are you coughing up any blood?" If so ask: "How much?" (flecks, streaks, tablespoons, etc.)      Denies    5. DIFFICULTY BREATHING: "Are you having difficulty breathing?" If Yes, ask: "How bad is it?" (e.g., mild, moderate, severe)     - MILD: No SOB at rest, mild SOB with walking, speaks normally in sentences, can lay down, no retractions, pulse < 100.     - MODERATE: SOB at rest, SOB with minimal exertion and prefers to sit, cannot lie down flat, speaks in phrases, mild retractions, audible wheezing, pulse 100-120.     - SEVERE: Very SOB at rest, speaks in single words, struggling to breathe, sitting hunched forward, retractions, pulse > 120       Moderate    6. FEVER: "Do you have a fever?" If Yes, ask: "What is your temperature, how was it measured, and when did it start?"      ESTUARDO. Pt denies chills. 7. CARDIAC HISTORY: "Do you have any history of heart disease?" (e.g., heart attack, congestive heart failure)       Denies    8. LUNG HISTORY: "Do you have any history of lung disease?"  (e.g., pulmonary embolus, asthma, emphysema)      Denies    9. PE RISK FACTORS: "Do you have a history of blood clots?" (or: recent major surgery, recent prolonged travel, bedridden)      Denies    10. OTHER SYMPTOMS: "Do you have any other symptoms?" (e.g., runny nose, wheezing, chest pain)        Wheezing, runny nose, sore throat, headache, ear congestion    11.  TRAVEL: "Have you traveled out of the country in the last month?" (e.g., travel history, exposures)        Denies    Protocols used: Cough - Acute Productive-ADULT-

## 2023-12-03 NOTE — TELEPHONE ENCOUNTER
Regarding: Cold symptoms  ----- Message from Debbie Mera RN sent at 12/3/2023  8:10 AM EST -----  " I have a really bad head cold and it hurts to swallow, I also have a very productive cough."

## 2023-12-03 NOTE — TELEPHONE ENCOUNTER
Regarding: Sore Throat / Head Cold  ----- Message from [facility] sent at 12/3/2023  7:38 AM EST -----  " I have a really bad head cold and it hurts to swallow, I also have a very productive cough"

## 2023-12-04 ENCOUNTER — OFFICE VISIT (OUTPATIENT)
Dept: FAMILY MEDICINE CLINIC | Facility: CLINIC | Age: 53
End: 2023-12-04
Payer: COMMERCIAL

## 2023-12-04 VITALS
BODY MASS INDEX: 36.22 KG/M2 | HEART RATE: 68 BPM | OXYGEN SATURATION: 94 % | HEIGHT: 68 IN | WEIGHT: 239 LBS | DIASTOLIC BLOOD PRESSURE: 82 MMHG | TEMPERATURE: 98.3 F | SYSTOLIC BLOOD PRESSURE: 120 MMHG

## 2023-12-04 DIAGNOSIS — J20.9 ACUTE BRONCHITIS, UNSPECIFIED ORGANISM: Primary | ICD-10-CM

## 2023-12-04 DIAGNOSIS — J22 LOWER RESP. TRACT INFECTION: ICD-10-CM

## 2023-12-04 LAB
SARS-COV-2 AG UPPER RESP QL IA: NEGATIVE
VALID CONTROL: NORMAL

## 2023-12-04 PROCEDURE — 99213 OFFICE O/P EST LOW 20 MIN: CPT | Performed by: FAMILY MEDICINE

## 2023-12-04 PROCEDURE — 96372 THER/PROPH/DIAG INJ SC/IM: CPT | Performed by: FAMILY MEDICINE

## 2023-12-04 PROCEDURE — 87811 SARS-COV-2 COVID19 W/OPTIC: CPT | Performed by: FAMILY MEDICINE

## 2023-12-04 RX ORDER — PREDNISONE 10 MG/1
TABLET ORAL
Qty: 26 TABLET | Refills: 0 | Status: SHIPPED | OUTPATIENT
Start: 2023-12-04

## 2023-12-04 RX ORDER — ALBUTEROL SULFATE 2.5 MG/3ML
2.5 SOLUTION RESPIRATORY (INHALATION) 3 TIMES DAILY PRN
Qty: 90 ML | Refills: 0 | Status: SHIPPED | OUTPATIENT
Start: 2023-12-04 | End: 2023-12-14

## 2023-12-04 RX ORDER — AZITHROMYCIN 250 MG/1
TABLET, FILM COATED ORAL
Qty: 12 TABLET | Refills: 0 | Status: SHIPPED | OUTPATIENT
Start: 2023-12-04 | End: 2023-12-08

## 2023-12-04 RX ORDER — ALBUTEROL SULFATE 90 UG/1
2 AEROSOL, METERED RESPIRATORY (INHALATION) EVERY 6 HOURS PRN
Qty: 18 G | Refills: 5 | Status: SHIPPED | OUTPATIENT
Start: 2023-12-04

## 2023-12-04 RX ORDER — CODEINE PHOSPHATE/GUAIFENESIN 10-100MG/5
5 LIQUID (ML) ORAL 3 TIMES DAILY PRN
Qty: 180 ML | Refills: 0 | Status: SHIPPED | OUTPATIENT
Start: 2023-12-04

## 2023-12-04 RX ORDER — METHYLPREDNISOLONE ACETATE 80 MG/ML
80 INJECTION, SUSPENSION INTRA-ARTICULAR; INTRALESIONAL; INTRAMUSCULAR; SOFT TISSUE ONCE
Status: COMPLETED | OUTPATIENT
Start: 2023-12-04 | End: 2023-12-04

## 2023-12-04 RX ADMIN — METHYLPREDNISOLONE ACETATE 80 MG: 80 INJECTION, SUSPENSION INTRA-ARTICULAR; INTRALESIONAL; INTRAMUSCULAR; SOFT TISSUE at 11:54

## 2023-12-04 NOTE — PROGRESS NOTES
Patient ID: Isiah Casanova is a 47 y.o. female. HPI: 47 y. o.female presenting with symptoms of chest congestion, cough and wheezing. She is couging up yellow phlegm. SUBJECTIVE    Family History   Problem Relation Age of Onset    Hypothyroidism Mother     Lung cancer Mother 72    Cancer Mother     Heart disease Father     Venous thrombosis Father         of Deep Vessels of Lower Extremity    Aortic aneurysm Father     Breast cancer Maternal Aunt 48    No Known Problems Maternal Aunt     No Known Problems Maternal Aunt     Breast cancer Paternal Aunt 48    Breast cancer Paternal Aunt 48    No Known Problems Paternal Aunt     No Known Problems Paternal Aunt     Esophageal cancer Paternal Uncle 61    Breast cancer Paternal Grandmother     Hypertension Paternal Grandfather     Diabetes Family     Cancer Family      Social History     Socioeconomic History    Marital status: /Civil Union     Spouse name: Not on file    Number of children: Not on file    Years of education: Not on file    Highest education level: Not on file   Occupational History    Not on file   Tobacco Use    Smoking status: Former     Packs/day: 1.00     Years: 15.00     Total pack years: 15.00     Types: Cigarettes     Start date: 1986     Quit date: 2001     Years since quittin.3    Smokeless tobacco: Never   Vaping Use    Vaping Use: Never used   Substance and Sexual Activity    Alcohol use:  Yes     Alcohol/week: 2.0 standard drinks of alcohol     Types: 2 Cans of beer per week     Comment: socially    Drug use: Never    Sexual activity: Not Currently     Partners: Male     Birth control/protection: Post-menopausal, Surgical, None   Other Topics Concern    Not on file   Social History Narrative    Daily coffee consumption , 2 cups/day    Exercise regularly     Social Determinants of Health     Financial Resource Strain: Not on file   Food Insecurity: Not on file   Transportation Needs: Not on file   Physical Activity: Not on file   Stress: Not on file   Social Connections: Not on file   Intimate Partner Violence: Not on file   Housing Stability: Not on file     Past Medical History:   Diagnosis Date    Anxiety     Asthma     no current issues    Colitis     Dysfunctional uterine bleeding     H/O total vaginal hysterectomy 2021    S/P left oophorectomy 2021     Past Surgical History:   Procedure Laterality Date    DILATION AND CURETTAGE OF UTERUS      DILATION AND CURETTAGE OF UTERUS N/A 2017    Procedure: DILATATION AND CURETTAGE (D&C); Surgeon: Rene Ambrose MD;  [facility];  Service:     ENDOMETRIAL ABLATION N/A 2017    Procedure: Jadiel Blu;  Surgeon: Rene Ambrose MD;  [facility];  Service:     HYSTERECTOMY  2021    INDUCED       By Dilation and Evacuation    OOPHORECTOMY Left 2021    Procedure: Rickey Alegria;  Surgeon: Vanessa Aceves MD;  [facility];  Service: Gynecology    SD CYSTOURETHROSCOPY N/A 2021    Procedure: Eve Lanier;  Surgeon: Vanessa Aceves MD;  [facility];  Service: Gynecology    SD LAPS ABD PRTM&OMENTUM DX W/WO SPEC BR/WA [facility] N/A 2021    Procedure: LAPAROSCOPY DIAGNOSTIC;  Surgeon: Vanessa Aceves MD;  [facility];  Service: Gynecology    SD VAG HYST 250 GM/< W/RMVL TUBE&/OVARY N/A 2021    Procedure: HYSTERECTOMY VAGINAL TOTAL (TVH) WITH BILATERAL SALPINGECTOMY;  Surgeon: Vanessa Aceves MD;  [facility];  Service: Gynecology     No Known Allergies  No current facility-administered medications for this visit.     Current Outpatient Medications:     albuterol (2.5 mg/3 mL) 0.083 % nebulizer solution, Take 3 mL (2.5 mg total) by nebulization 3 (three) times a day as needed for wheezing or shortness of breath for up to 10 days, Disp: 90 mL, Rfl: 0    albuterol (Ventolin HFA) 90 mcg/act inhaler, Inhale 2 puffs every 6 (six) hours as needed for wheezing, Disp: 18 g, Rfl: 5    azithromycin (ZITHROMAX) 250 mg tablet, Take 2 tabs today and then one daily until finished, Disp: 12 tablet, Rfl: 0    cholecalciferol (VITAMIN D3) 250 MCG (15182 UT) capsule, Take 10,000 Units by mouth daily, Disp: , Rfl:     Cyanocobalamin (VITAMIN B 12 PO), Take by mouth, Disp: , Rfl:     escitalopram (LEXAPRO) 20 mg tablet, Take 1 tablet (20 mg total) by mouth daily, Disp: 90 tablet, Rfl: 1    guaifenesin-codeine (GUAIFENESIN AC) 100-10 MG/5ML liquid, Take 5 mL by mouth 3 (three) times a day as needed for cough, Disp: 180 mL, Rfl: 0    Magnesium Gluconate (MAGNESIUM 27 PO), Take by mouth, Disp: , Rfl:     predniSONE 10 mg tablet, 3 tabs po bid x2 days, then 2 tabs po bid x2 days, then 1 tab bid x2 days, then 1 daily until done., Disp: 26 tablet, Rfl: 0    benzonatate (TESSALON PERLES) 100 mg capsule, Take 2 capsules (200 mg total) by mouth 2 (two) times a day as needed for cough for up to 5 days, Disp: 20 capsule, Rfl: 0    escitalopram (LEXAPRO) 20 mg tablet, Take 1 tablet (20 mg total) by mouth daily for 10 days, Disp: 10 tablet, Rfl: 0    Review of Systems    Constitutional:     Denies fever, chills, fatigue, weakness ,weight loss, weight gain     ENT: Denies earache, loss of hearing, nosebleed, nasal discharge,nasal congestion, sore throat,hoarseness  Pulmonary: Denies shortness of breath , dyspnea on exertion, orthopnea ,but complains of cough with productive yellow phlegm, wheezing and pnd.   Cardiovascular:  Denies bradycardia , tachycardia ,palpations, lower extremity, edema leg, claudication  Breast:  Denies new or changing breast lumps,  nipple discharge, nipple changes,  Abdomen:  Denies abdominal pain , anorexia ,indigestion, nausea ,vomiting, constipation , diarrhea  Musculoskeletal: Denies myalgias, arthralgias, joint swelling, joint stiffness ,limb pain, limb swelling  Lymph: denies swollen glands  Gu: no dysuria or urinary frequency  Skin: Denies skin rash, skin lesion, skin wound, itching,dry skin  Neuro: Denies headache, numbness, tingling, confusion, loss of consciousness, dizziness ,vertigo  Psychiatric: Denies feelings of depression, suicidal ideation, anxiety, sleep disturbances    OBJECTIVE  /82   Pulse 68   Temp 98.3 °F (36.8 °C)   Ht 5' 8" (1.727 m)   Wt 108 kg (239 lb)   LMP 12/27/2020 (Exact Date)   SpO2 94%   BMI 36.34 kg/m²   Constitutional:   NAD, well appearing and well nourished      ENT:   Conjunctiva and lids: no injection, edema, or discharge     Pupils and iris: TREASURE bilaterally    External inspection of ears and nose: normal without deformities or discharge. Otoscopic exam: Canals patent without erythema; tm are dull and erythematous bilaterally       Nasal mucosa, septum and turbinates: Turbinae injection with discharge   Oropharynx:  Moist mucosa, normal tongue and tonsils without lesions. Erythema and injection  of post pharynx with pnd     Pulmonary:Respiratory effort normal rate and rhythm, no increased work of breathing.  Auscultation of lungs:  Scattered rhonchi and expiratory wheezes bilaterally      Cardiovascular: regular rate and rhythm, S1 and S2, no murmur, no edema and/or varicosities of LE      Abdomen: Soft and non-distended    Positive bowel sounds    No heptomegaly or splenomegaly    Gu: no suprapubic tenderness, no CVA tenderness, or urethral discharge  Lymphatic: Anterior cervical lymphadenopathy     Muscskeletal:  Gait and station: Normal gait     Digits and nails normal without clubbing or cyanosis      Inspection/palpation of joints, bones, and muscles:  No joint tenderness, swelling, full active and passive range of motion  Skin: Normal skin turgor and no rashes      Neuro:    Normal reflexes       Psych:   alert and oriented to person, place and time     normal mood and affect       Assessment/Plan:Diagnoses and all orders for this visit:    Acute bronchitis, unspecified organism  -     methylPREDNISolone acetate (DEPO-MEDROL) injection 80 mg  -     predniSONE 10 mg tablet; 3 tabs po bid x2 days, then 2 tabs po bid x2 days, then 1 tab bid x2 days, then 1 daily until done. -     albuterol (Ventolin HFA) 90 mcg/act inhaler; Inhale 2 puffs every 6 (six) hours as needed for wheezing  -     azithromycin (ZITHROMAX) 250 mg tablet; Take 2 tabs today and then one daily until finished  -     albuterol (2.5 mg/3 mL) 0.083 % nebulizer solution; Take 3 mL (2.5 mg total) by nebulization 3 (three) times a day as needed for wheezing or shortness of breath for up to 10 days  -     guaifenesin-codeine (GUAIFENESIN AC) 100-10 MG/5ML liquid; Take 5 mL by mouth 3 (three) times a day as needed for cough    Lower resp. tract infection  -     POCT Rapid Covid Ag    Other orders  -     Cyanocobalamin (VITAMIN B 12 PO); Take by mouth  -     cholecalciferol (VITAMIN D3) 250 MCG (00298 UT) capsule; Take 10,000 Units by mouth daily        Reviewed with patient plan to treat with above plan. Patient instructed to call in 72 hours if not feeling better or if symptoms worsen   Answers submitted by the patient for this visit:  Cough Questionnaire (Submitted on 12/4/2023)  Chief Complaint: Cough  Chronicity: new  Onset: in the past 7 days  Progression since onset: gradually worsening  Frequency: every few minutes  Cough characteristics: non-productive, productive of purulent sputum  chest pain: No  chills: No  ear congestion: Yes  ear pain: Yes  fever: No  headaches: Yes  heartburn: No  hemoptysis: No  myalgias: No  nasal congestion: Yes  postnasal drip: Yes  rash: No  rhinorrhea: Yes  shortness of breath: Yes  sore throat: Yes  sweats: Yes  weight loss: No  wheezing: Yes  Aggravated by: lying down  .

## 2023-12-06 ENCOUNTER — APPOINTMENT (EMERGENCY)
Dept: CT IMAGING | Facility: HOSPITAL | Age: 53
End: 2023-12-06
Payer: COMMERCIAL

## 2023-12-06 ENCOUNTER — HOSPITAL ENCOUNTER (EMERGENCY)
Facility: HOSPITAL | Age: 53
Discharge: HOME | End: 2023-12-06
Attending: EMERGENCY MEDICINE
Payer: COMMERCIAL

## 2023-12-06 ENCOUNTER — APPOINTMENT (EMERGENCY)
Dept: RADIOLOGY | Facility: HOSPITAL | Age: 53
End: 2023-12-06
Payer: COMMERCIAL

## 2023-12-06 VITALS
SYSTOLIC BLOOD PRESSURE: 152 MMHG | TEMPERATURE: 98.6 F | OXYGEN SATURATION: 94 % | DIASTOLIC BLOOD PRESSURE: 86 MMHG | HEART RATE: 61 BPM | RESPIRATION RATE: 18 BRPM

## 2023-12-06 DIAGNOSIS — R05.9 COUGH: ICD-10-CM

## 2023-12-06 DIAGNOSIS — J45.901 ASTHMA EXACERBATION: ICD-10-CM

## 2023-12-06 DIAGNOSIS — R07.9 CHEST PAIN: ICD-10-CM

## 2023-12-06 DIAGNOSIS — J21.0 RSV (ACUTE BRONCHIOLITIS DUE TO RESPIRATORY SYNCYTIAL VIRUS): Primary | ICD-10-CM

## 2023-12-06 PROBLEM — J22 LOWER RESP. TRACT INFECTION: Status: ACTIVE | Noted: 2023-12-06

## 2023-12-06 LAB
2HR DELTA HS TROPONIN: -1 NG/L
ALBUMIN SERPL BCP-MCNC: 4.4 G/DL (ref 3.5–5)
ALP SERPL-CCNC: 93 U/L (ref 34–104)
ALT SERPL W P-5'-P-CCNC: 32 U/L (ref 7–52)
ANION GAP SERPL CALCULATED.3IONS-SCNC: 7 MMOL/L
AST SERPL W P-5'-P-CCNC: 19 U/L (ref 13–39)
ATRIAL RATE: 66 BPM
BASOPHILS # BLD AUTO: 0.02 THOUSANDS/ÂΜL (ref 0–0.1)
BASOPHILS NFR BLD AUTO: 0 % (ref 0–1)
BILIRUB SERPL-MCNC: 0.3 MG/DL (ref 0.2–1)
BUN SERPL-MCNC: 16 MG/DL (ref 5–25)
CALCIUM SERPL-MCNC: 10.2 MG/DL (ref 8.4–10.2)
CARDIAC TROPONIN I PNL SERPL HS: 4 NG/L
CARDIAC TROPONIN I PNL SERPL HS: 5 NG/L
CHLORIDE SERPL-SCNC: 106 MMOL/L (ref 96–108)
CO2 SERPL-SCNC: 26 MMOL/L (ref 21–32)
CREAT SERPL-MCNC: 0.78 MG/DL (ref 0.6–1.3)
D DIMER PPP FEU-MCNC: 0.58 UG/ML FEU
EOSINOPHIL # BLD AUTO: 0 THOUSAND/ÂΜL (ref 0–0.61)
EOSINOPHIL NFR BLD AUTO: 0 % (ref 0–6)
ERYTHROCYTE [DISTWIDTH] IN BLOOD BY AUTOMATED COUNT: 12.8 % (ref 11.6–15.1)
FLUAV RNA RESP QL NAA+PROBE: NEGATIVE
FLUBV RNA RESP QL NAA+PROBE: NEGATIVE
GFR SERPL CREATININE-BSD FRML MDRD: 87 ML/MIN/1.73SQ M
GLUCOSE SERPL-MCNC: 147 MG/DL (ref 65–140)
HCT VFR BLD AUTO: 41.5 % (ref 34.8–46.1)
HGB BLD-MCNC: 13.8 G/DL (ref 11.5–15.4)
IMM GRANULOCYTES # BLD AUTO: 0.08 THOUSAND/UL (ref 0–0.2)
IMM GRANULOCYTES NFR BLD AUTO: 1 % (ref 0–2)
LYMPHOCYTES # BLD AUTO: 1.18 THOUSANDS/ÂΜL (ref 0.6–4.47)
LYMPHOCYTES NFR BLD AUTO: 12 % (ref 14–44)
MCH RBC QN AUTO: 29.7 PG (ref 26.8–34.3)
MCHC RBC AUTO-ENTMCNC: 33.3 G/DL (ref 31.4–37.4)
MCV RBC AUTO: 89 FL (ref 82–98)
MONOCYTES # BLD AUTO: 0.64 THOUSAND/ÂΜL (ref 0.17–1.22)
MONOCYTES NFR BLD AUTO: 7 % (ref 4–12)
NEUTROPHILS # BLD AUTO: 7.57 THOUSANDS/ÂΜL (ref 1.85–7.62)
NEUTS SEG NFR BLD AUTO: 80 % (ref 43–75)
NRBC BLD AUTO-RTO: 0 /100 WBCS
P AXIS: 49 DEGREES
PLATELET # BLD AUTO: 282 THOUSANDS/UL (ref 149–390)
PMV BLD AUTO: 11.1 FL (ref 8.9–12.7)
POTASSIUM SERPL-SCNC: 4.1 MMOL/L (ref 3.5–5.3)
PR INTERVAL: 168 MS
PROT SERPL-MCNC: 7.3 G/DL (ref 6.4–8.4)
QRS AXIS: 70 DEGREES
QRSD INTERVAL: 88 MS
QT INTERVAL: 418 MS
QTC INTERVAL: 438 MS
RBC # BLD AUTO: 4.64 MILLION/UL (ref 3.81–5.12)
RSV RNA RESP QL NAA+PROBE: POSITIVE
SARS-COV-2 RNA RESP QL NAA+PROBE: NEGATIVE
SODIUM SERPL-SCNC: 139 MMOL/L (ref 135–147)
T WAVE AXIS: 74 DEGREES
VENTRICULAR RATE: 66 BPM
WBC # BLD AUTO: 9.49 THOUSAND/UL (ref 4.31–10.16)

## 2023-12-06 PROCEDURE — 85379 FIBRIN DEGRADATION QUANT: CPT

## 2023-12-06 PROCEDURE — 85025 COMPLETE CBC W/AUTO DIFF WBC: CPT

## 2023-12-06 PROCEDURE — 0241U HB NFCT DS VIR RESP RNA 4 TRGT: CPT

## 2023-12-06 PROCEDURE — 99285 EMERGENCY DEPT VISIT HI MDM: CPT | Performed by: EMERGENCY MEDICINE

## 2023-12-06 PROCEDURE — 71045 X-RAY EXAM CHEST 1 VIEW: CPT

## 2023-12-06 PROCEDURE — 84484 ASSAY OF TROPONIN QUANT: CPT

## 2023-12-06 PROCEDURE — 93005 ELECTROCARDIOGRAM TRACING: CPT

## 2023-12-06 PROCEDURE — 71275 CT ANGIOGRAPHY CHEST: CPT

## 2023-12-06 PROCEDURE — 99285 EMERGENCY DEPT VISIT HI MDM: CPT

## 2023-12-06 PROCEDURE — G1004 CDSM NDSC: HCPCS

## 2023-12-06 PROCEDURE — 80053 COMPREHEN METABOLIC PANEL: CPT

## 2023-12-06 PROCEDURE — 36415 COLL VENOUS BLD VENIPUNCTURE: CPT

## 2023-12-06 RX ORDER — BENZONATATE 100 MG/1
200 CAPSULE ORAL 2 TIMES DAILY PRN
Qty: 20 CAPSULE | Refills: 0 | Status: SHIPPED | OUTPATIENT
Start: 2023-12-06 | End: 2023-12-11

## 2023-12-06 RX ORDER — ACETAMINOPHEN 325 MG/1
975 TABLET ORAL ONCE
Status: COMPLETED | OUTPATIENT
Start: 2023-12-06 | End: 2023-12-06

## 2023-12-06 RX ORDER — BENZONATATE 100 MG/1
200 CAPSULE ORAL ONCE
Status: COMPLETED | OUTPATIENT
Start: 2023-12-06 | End: 2023-12-06

## 2023-12-06 RX ORDER — IBUPROFEN 600 MG/1
600 TABLET ORAL ONCE
Status: COMPLETED | OUTPATIENT
Start: 2023-12-06 | End: 2023-12-06

## 2023-12-06 RX ORDER — IPRATROPIUM BROMIDE AND ALBUTEROL SULFATE 2.5; .5 MG/3ML; MG/3ML
3 SOLUTION RESPIRATORY (INHALATION) ONCE
Status: COMPLETED | OUTPATIENT
Start: 2023-12-06 | End: 2023-12-06

## 2023-12-06 RX ORDER — IPRATROPIUM BROMIDE AND ALBUTEROL SULFATE 2.5; .5 MG/3ML; MG/3ML
3 SOLUTION RESPIRATORY (INHALATION) 4 TIMES DAILY
Qty: 120 ML | Refills: 0 | Status: SHIPPED | OUTPATIENT
Start: 2023-12-06 | End: 2023-12-16

## 2023-12-06 RX ADMIN — IOHEXOL 67 ML: 350 INJECTION, SOLUTION INTRAVENOUS at 02:57

## 2023-12-06 RX ADMIN — ACETAMINOPHEN 975 MG: 325 TABLET, FILM COATED ORAL at 01:12

## 2023-12-06 RX ADMIN — IPRATROPIUM BROMIDE AND ALBUTEROL SULFATE 3 ML: 2.5; .5 SOLUTION RESPIRATORY (INHALATION) at 01:14

## 2023-12-06 RX ADMIN — BENZONATATE 200 MG: 100 CAPSULE ORAL at 01:11

## 2023-12-06 RX ADMIN — IBUPROFEN 600 MG: 600 TABLET, FILM COATED ORAL at 01:11

## 2023-12-06 NOTE — DISCHARGE INSTRUCTIONS
Return to the ER if you develop: Worsening shortness of breath, chest pain, weakness, vomiting, confusion, lightheadedness, or worsening of your condition overall.

## 2023-12-06 NOTE — ED PROVIDER NOTES
History  Chief Complaint   Patient presents with    Chest Pain     Pt has been on medications for bronchitis for a few days. Increasing SOB & chest pain today. 48yo F w/ h/o asthma presenting for SOB. 1wk ago Pt developed a cough productive of yellow sputum. She went to  where she was diagnosed and treated for bronchitis. Approximately 1 day ago she had worsening chest tightness, SOB, and wheezing. Has associated HA, fatigue, myalgias. Did not find relief with albuterol inhaler, albuterol nebulizer, or PO steroids. Denies h/o DVT/PE, calf pain/swelling, N/V, pleurisy, back pain, F/C. History provided by:  Patient      Prior to Admission Medications   Prescriptions Last Dose Informant Patient Reported? Taking? Cyanocobalamin (VITAMIN B 12 PO)  Self Yes No   Sig: Take by mouth   Magnesium Gluconate (MAGNESIUM 27 PO)  Self Yes No   Sig: Take by mouth   albuterol (2.5 mg/3 mL) 0.083 % nebulizer solution   No No   Sig: Take 3 mL (2.5 mg total) by nebulization 3 (three) times a day as needed for wheezing or shortness of breath for up to 10 days   albuterol (Ventolin HFA) 90 mcg/act inhaler   No No   Sig: Inhale 2 puffs every 6 (six) hours as needed for wheezing   azithromycin (ZITHROMAX) 250 mg tablet   No No   Sig: Take 2 tabs today and then one daily until finished   cholecalciferol (VITAMIN D3) 250 MCG (90703 UT) capsule  Self Yes No   Sig: Take 10,000 Units by mouth daily   escitalopram (LEXAPRO) 20 mg tablet  Self No No   Sig: Take 1 tablet (20 mg total) by mouth daily   escitalopram (LEXAPRO) 20 mg tablet   No No   Sig: Take 1 tablet (20 mg total) by mouth daily for 10 days   guaifenesin-codeine (GUAIFENESIN AC) 100-10 MG/5ML liquid   No No   Sig: Take 5 mL by mouth 3 (three) times a day as needed for cough   predniSONE 10 mg tablet   No No   Sig: 3 tabs po bid x2 days, then 2 tabs po bid x2 days, then 1 tab bid x2 days, then 1 daily until done.       Facility-Administered Medications: None       Past Medical History:   Diagnosis Date    Anxiety     Asthma     no current issues    Colitis     Dysfunctional uterine bleeding     H/O total vaginal hysterectomy 2021    S/P left oophorectomy 2021       Past Surgical History:   Procedure Laterality Date    DILATION AND CURETTAGE OF UTERUS      DILATION AND CURETTAGE OF UTERUS N/A 2017    Procedure: DILATATION AND CURETTAGE (D&C);   Surgeon: Jerson Alaniz MD;  [facility];  Service:     ENDOMETRIAL ABLATION N/A 2017    Procedure: Atlanta Sharps;  Surgeon: Jerson Alaniz MD;  [facility];  Service:     HYSTERECTOMY  2021    INDUCED       By Dilation and Evacuation    OOPHORECTOMY Left 2021    Procedure: Vonda Santana;  Surgeon: Raphael Cooley MD;  [facility];  Service: Gynecology    NC CYSTOURETHROSCOPY N/A 2021    Procedure: Jazmyn Nair;  Surgeon: Raphael Cooley MD;  [facility];  Service: Gynecology    NC LAPS ABD PRTM&OMENTUM DX W/WO [facility]/[place] [facility] N/A 2021    Procedure: LAPAROSCOPY DIAGNOSTIC;  Surgeon: Raphael Cooley MD;  [facility];  Service: Gynecology    NC VAG HYST 250 GM/< W/RMVL TUBE&/OVARY N/A 2021    Procedure: HYSTERECTOMY VAGINAL TOTAL (TVH) WITH BILATERAL SALPINGECTOMY;  Surgeon: Raphael Cooley MD;  [facility];  Service: Gynecology       Family History   Problem Relation Age of Onset    Hypothyroidism Mother     Lung cancer Mother 72    Cancer Mother     Heart disease Father     Venous thrombosis Father         of Deep Vessels of Lower Extremity    Aortic aneurysm Father     Breast cancer Maternal Aunt 48    No Known Problems Maternal Aunt     No Known Problems Maternal Aunt     Breast cancer Paternal Aunt 46    Breast cancer Paternal Aunt 48    No Known Problems Paternal Aunt     No Known Problems Paternal Aunt     Esophageal cancer Paternal Uncle 61    Breast cancer Paternal Grandmother     Hypertension Paternal Grandfather     Diabetes Family     Cancer Family      I have reviewed and agree with the history as documented. E-Cigarette/Vaping    E-Cigarette Use Never User      E-Cigarette/Vaping Substances    Nicotine No     THC No     CBD No     Flavoring No     Other No     Unknown No      Social History     Tobacco Use    Smoking status: Former     Packs/day: 1.00     Years: 15.00     Total pack years: 15.00     Types: Cigarettes     Start date: 1986     Quit date: 2001     Years since quittin.3    Smokeless tobacco: Never   Vaping Use    Vaping Use: Never used   Substance Use Topics    Alcohol use: Yes     Alcohol/week: 2.0 standard drinks of alcohol     Types: 2 Cans of beer per week     Comment: socially    Drug use: Never        Review of Systems   Constitutional:  Positive for fatigue. HENT: Negative. Respiratory:  Positive for cough, chest tightness, shortness of breath and wheezing. Cardiovascular: Negative. Gastrointestinal: Negative. Genitourinary: Negative. Musculoskeletal:  Positive for myalgias. Skin: Negative. Neurological:  Positive for headaches. Psychiatric/Behavioral: Negative. Physical Exam  ED Triage Vitals   Temperature Pulse Respirations Blood Pressure SpO2   23 0045 23 0045 23 0045 23 0045 23 0045   98.6 °F (37 °C) 74 20 (!) 175/81 96 %      Temp Source Heart Rate Source Patient Position - Orthostatic VS BP Location FiO2 (%)   23 0045 23 0045 23 0045 23 0045 --   Oral Monitor Sitting Right arm       Pain Score       23 0111       4             Orthostatic Vital Signs  Vitals:    23 0045 23 0309 23 0400 23 0500   BP: (!) 175/81 152/81 137/72 152/86   Pulse: 74 63 62 61   Patient Position - Orthostatic VS: Sitting Lying Sitting        Physical Exam  Constitutional:       General: She is not in acute distress. Appearance: Normal appearance.    HENT:      Head: Normocephalic and atraumatic. Right Ear: External ear normal.      Left Ear: External ear normal.      Nose: Nose normal. No rhinorrhea. Mouth/Throat:      Mouth: Mucous membranes are moist.      Pharynx: Oropharynx is clear. Eyes:      Extraocular Movements: Extraocular movements intact. Conjunctiva/sclera: Conjunctivae normal.   Cardiovascular:      Rate and Rhythm: Normal rate and regular rhythm. Pulses: Normal pulses. Heart sounds: Normal heart sounds. Pulmonary:      Effort: No respiratory distress. Breath sounds: Wheezing present. Abdominal:      General: Abdomen is flat. Palpations: Abdomen is soft. Musculoskeletal:         General: No tenderness. Right lower leg: No edema. Left lower leg: No edema. Skin:     General: Skin is warm and dry. Capillary Refill: Capillary refill takes less than 2 seconds. Neurological:      General: No focal deficit present. Mental Status: She is alert and oriented to person, place, and time.    Psychiatric:         Mood and Affect: Mood normal.         Behavior: Behavior normal.         ED Medications  Medications   ipratropium-albuterol (DUO-NEB) 0.5-2.5 mg/3 mL inhalation solution 3 mL (3 mL Nebulization Given 12/6/23 0114)   acetaminophen (TYLENOL) tablet 975 mg (975 mg Oral Given 12/6/23 0112)   ibuprofen (MOTRIN) tablet 600 mg (600 mg Oral Given 12/6/23 0111)   benzonatate (TESSALON PERLES) capsule 200 mg (200 mg Oral Given 12/6/23 0111)   iohexol (OMNIPAQUE) 350 MG/ML injection (MULTI-DOSE) 67 mL (67 mL Intravenous Given 12/6/23 0257)       Diagnostic Studies  Results Reviewed       Procedure Component Value Units Date/Time    HS Troponin I 2hr [[id number]]  (Normal) Collected: 12/06/23 0421    Lab Status: Final result Specimen: Blood from Arm, Left Updated: 12/06/23 7189     hs TnI 2hr 4 ng/L      Delta 2hr hsTnI -1 ng/L     D-Dimer [[id number]]  (Abnormal) Collected: 12/06/23 0128    Lab Status: Final result Specimen: Blood from Arm, Left Updated: 12/06/23 0226     D-Dimer, Quant 0.58 ug/ml FEU     Narrative: In the evaluation for possible pulmonary embolism, in the appropriate (Well's Score of 4 or less) patient, the age adjusted d-dimer cutoff for this patient can be calculated as:    Age x 0.01 (in ug/mL) for Age-adjusted D-dimer exclusion threshold for a patient over 50 years. FLU/RSV/COVID - if FLU/RSV clinically relevant [[id number]]  (Abnormal) Collected: 12/06/23 0120    Lab Status: Final result Specimen: Nares from Nose Updated: 12/06/23 0225     SARS-CoV-2 Negative     INFLUENZA A PCR Negative     INFLUENZA B PCR Negative     RSV PCR Positive    Narrative:      FOR PEDIATRIC PATIENTS - copy/paste COVID Guidelines URL to browser: [email or web address]    SARS-CoV-2 assay is a Nucleic Acid Amplification assay intended for the  qualitative detection of nucleic acid from SARS-CoV-2 in nasopharyngeal  swabs. Results are for the presumptive identification of SARS-CoV-2 RNA. Positive results are indicative of infection with SARS-CoV-2, the virus  causing COVID-19, but do not rule out bacterial infection or co-infection  with other viruses. Laboratories within the [facility] and its  territories are required to report all positive results to the appropriate  public health authorities. Negative results do not preclude SARS-CoV-2  infection and should not be used as the sole basis for treatment or other  patient management decisions. Negative results must be combined with  clinical observations, patient history, and epidemiological information. This test has not been FDA cleared or approved. This test has been authorized by FDA under an Emergency Use Authorization  (EUA).  This test is only authorized for the duration of time the  declaration that circumstances exist justifying the authorization of the  emergency use of an in vitro diagnostic tests for detection of SARS-CoV-2  virus and/or diagnosis of COVID-19 infection under section 564(b)(1) of  the Act, 21 U. S.C. 563BEY-6(F)(1), unless the authorization is terminated  or revoked sooner. The test has been validated but independent review by FDA  and CLIA is pending. Test performed using [employer] Gene[employer]pert: This RT-PCR assay targets N2,  a region unique to SARS-CoV-2. A conserved region in the E-gene was chosen  for pan-Sarbecovirus detection which includes SARS-CoV-2. According to CMS-2020-01-R, this platform meets the definition of high-throughput technology.     HS Troponin 0hr (reflex protocol) [[id number]]  (Normal) Collected: 12/06/23 0120    Lab Status: Final result Specimen: Blood from Arm, Left Updated: 12/06/23 0208     hs TnI 0hr 5 ng/L     Comprehensive metabolic panel [[id number]]  (Abnormal) Collected: 12/06/23 0120    Lab Status: Final result Specimen: Blood from Arm, Left Updated: 12/06/23 0206     Sodium 139 mmol/L      Potassium 4.1 mmol/L      Chloride 106 mmol/L      CO2 26 mmol/L      ANION GAP 7 mmol/L      BUN 16 mg/dL      Creatinine 0.78 mg/dL      Glucose 147 mg/dL      Calcium 10.2 mg/dL      AST 19 U/L      ALT 32 U/L      Alkaline Phosphatase 93 U/L      Total Protein 7.3 g/dL      Albumin 4.4 g/dL      Total Bilirubin 0.30 mg/dL      eGFR 87 ml/min/1.73sq m     Narrative:      [place] guidelines for Chronic Kidney Disease (CKD):     Stage 1 with normal or high GFR (GFR > 90 mL/min/1.73 square meters)    Stage 2 Mild CKD (GFR = 60-89 mL/min/1.73 square meters)    Stage 3A Moderate CKD (GFR = 45-59 mL/min/1.73 square meters)    Stage 3B Moderate CKD (GFR = 30-44 mL/min/1.73 square meters)    Stage 4 Severe CKD (GFR = 15-29 mL/min/1.73 square meters)    Stage 5 End Stage CKD (GFR <15 mL/min/1.73 square meters)  Note: GFR calculation is accurate only with a steady state creatinine    CBC and differential [[id number]]  (Abnormal) Collected: 12/06/23 0120 Lab Status: Final result Specimen: Blood from Arm, Left Updated: 12/06/23 0133     WBC 9.49 Thousand/uL      RBC 4.64 Million/uL      Hemoglobin 13.8 g/dL      Hematocrit 41.5 %      MCV 89 fL      MCH 29.7 pg      MCHC 33.3 g/dL      RDW 12.8 %      MPV 11.1 fL      Platelets 826 Thousands/uL      nRBC 0 /100 WBCs      Neutrophils Relative 80 %      Immat GRANS % 1 %      Lymphocytes Relative 12 %      Monocytes Relative 7 %      Eosinophils Relative 0 %      Basophils Relative 0 %      Neutrophils Absolute 7.57 Thousands/µL      Immature Grans Absolute 0.08 Thousand/uL      Lymphocytes Absolute 1.18 Thousands/µL      Monocytes Absolute 0.64 Thousand/µL      Eosinophils Absolute 0.00 Thousand/µL      Basophils Absolute 0.02 Thousands/µL                    CTA ED chest PE study   Final Result by Stacy Mahoney MD (12/06 0449)      No evidence of pulmonary embolus.                   Workstation performed: [id number]         XR chest 1 view portable   ED Interpretation by Curt Amaya MD (12/06 0126)   No evidence of acute cardiopulmonary pathology            Procedures  ECG 12 Lead Documentation Only    Date/Time: 12/6/2023 1:25 AM    Performed by: Curt Amaya MD  Authorized by: Curt Amaya MD    ECG reviewed by me, the ED Provider: yes    Patient location:  ED  Interpretation:     Interpretation: normal    Rate:     ECG rate:  79    ECG rate assessment: normal    Rhythm:     Rhythm: sinus rhythm    Ectopy:     Ectopy: none    QRS:     QRS axis:  Normal    QRS intervals:  Normal  Conduction:     Conduction: normal    ST segments:     ST segments:  Normal  T waves:     T waves: normal          ED Course  ED Course as of 12/06/23 0619   Wed Dec 06, 2023   0203 Ddx includes but not limited to asthma exacerbation, PNA, ACS, PE.   0208 hs TnI 0hr: 5   0236 D-Dimer, Quant(!): 0.58   0236 RSV PCR(!): Positive   0244 Cough and SOB improving             HEART Risk Score      Flowsheet Row Most Recent Value   Heart Score Risk Calculator    History 0 Filed at: 12/06/2023 9916   ECG 0 Filed at: 12/06/2023 1067   Age 1 Filed at: 12/06/2023 0209   Risk Factors 1 Filed at: 12/06/2023 0209   Troponin 0 Filed at: 12/06/2023 0209   HEART Score 2 Filed at: 12/06/2023 0209                            Wells' Criteria for PE      Flowsheet Row Most Recent Value   Wells' Criteria for PE    Clinical signs and symptoms of DVT 0 Filed at: 12/06/2023 0208   PE is primary diagnosis or equally likely 3 Filed at: 12/06/2023 0208   HR >100 0 Filed at: 12/06/2023 7457   Immobilization at least 3 days or Surgery in the previous 4 weeks 0 Filed at: 12/06/2023 0208   Previous, objectively diagnosed PE or DVT 0 Filed at: 12/06/2023 0208   Hemoptysis 0 Filed at: 12/06/2023 0208   Malignancy with treatment within 6 months or palliative 0 Filed at: 12/06/2023 3401   Wells' Criteria Total 3 Filed at: 12/06/2023 0208              Medical Decision Making  CT PE study negative. EKG and troponin unremarkable for cardiac etiology. Pt with likely asthma exacerbation 2/2 RSV infection. Pt made major improvements with interventions provided in the department. Pt desires to go home. Strict return precautions discussed. Amount and/or Complexity of Data Reviewed  Labs: ordered. Decision-making details documented in ED Course. Radiology: ordered and independent interpretation performed. Risk  OTC drugs. Prescription drug management.           Disposition  Final diagnoses:   RSV (acute bronchiolitis due to respiratory syncytial virus)   Asthma exacerbation   Cough   Chest pain     Time reflects when diagnosis was documented in both MDM as applicable and the Disposition within this note       Time User Action Codes Description Comment    12/6/2023  2:44 AM Manny Jose Luis Add [J21.0] RSV (acute bronchiolitis due to respiratory syncytial virus)     12/6/2023  2:44 AM Manny Jose Luis Add [J45.901] Asthma exacerbation     12/6/2023  2:55 AM Manny Jose Luis Add [R05.9] Cough     12/6/2023  5:39 AM Vazquez Lynne Add [R07.9] Chest pain           ED Disposition       ED Disposition   Discharge    Condition   Stable    Date/Time   Wed Dec 6, 2023 [facility] discharge to home/self care. Follow-up Information       Follow up With Specialties Details Why Contact Info Additional [address] [facility] Emergency Medicine Go to  If symptoms worsen [address] [facility] Emergency Department, [address] [place] [address], [address] [place] [address],  Family Medicine Call in 1 day Return sooner if increased difficulty breathing, worsening pain, fever, vomiting, rash. [address]  [phone]               Patient's Medications   Discharge Prescriptions    BENZONATATE (TESSALON PERLES) 100 MG CAPSULE    Take 2 capsules (200 mg total) by mouth 2 (two) times a day as needed for cough for up to 5 days       Start Date: 12/6/2023 End Date: 12/11/2023       Order Dose: 200 mg       Quantity: 20 capsule    Refills: 0    IPRATROPIUM-ALBUTEROL (DUO-NEB) 0.5-2.5 MG/3 ML NEBULIZER SOLUTION    Take 3 mL by nebulization 4 (four) times a day for 10 days       Start Date: 12/6/2023 End Date: 12/16/2023       Order Dose: 3 mL       Quantity: 120 mL    Refills: 0     No discharge procedures on file. PDMP Review         Value Time User    PDMP Reviewed  Yes 12/6/2023 12:47 AM Vazquez Lynne MD             ED Provider  Attending physically available and evaluated Ashely Concepcion. I managed the patient along with the ED Attending.     Electronically Signed by           Nabil Romo MD  12/06/23 7168

## 2023-12-06 NOTE — ED ATTENDING ATTESTATION
12/6/2023  I, Carrington Goddard MD, saw and evaluated the patient. I have discussed the patient with the resident/non-physician practitioner and agree with the resident's/non-physician practitioner's findings, Plan of Care, and MDM as documented in the resident's/non-physician practitioner's note, except where noted. All available labs and Radiology studies were reviewed. I was present for key portions of any procedure(s) performed by the resident/non-physician practitioner and I was immediately available to provide assistance. At this point I agree with the current assessment done in the Emergency Department. I have conducted an independent evaluation of this patient a history and physical is as follows:  Patient is a 47year old female with worsening sob, cough, pleuritic chest tightness since last Thursday. No travel. No known ill contacts. No fever. No N/V. No smoking. Was last seen at [facility] on 12/4/23 for lower respiratory tract infection. Patient states she has been taking inhaler, steroid and erythromycin without relief. [facility] website checked on this patient and last Rx filled was on 12/4/23 for guaifenesin with codeine for 12 day supply. NCAT. Moist mucous membranes. Lungs with diffuse rhonci. No wheezing or rales. Heart regular without murmur. Abdomen soft and nontender. Good bowel sounds. No edema. No rash noted. DDX including but not limited to: pneumonia, pleural effusion, CHF, PE, PTX, ACS, MI, asthma exacerbation, COPD exacerbation, COVID 23, RSV, influenza, bronchitis, anemia, metabolic abnormality, renal failure. Doubt rhabdomyolysis or dissection. I reviewed EKG and CXR. Will check labs.      ED Course         Critical Care Time  Procedures

## 2023-12-08 ENCOUNTER — TELEPHONE (OUTPATIENT)
Age: 53
End: 2023-12-08

## 2023-12-08 NOTE — TELEPHONE ENCOUNTER
Patient states she was seen in the ED this week for RSV. She was placed on a steroid, nebulizer, cough medicine, and antibiotics. She c/o pain when she coughs and sneezes. She denies CP/ denies trouble breathing. Denies fever. Patient states she would like a letter for her job from 12/04-12/08. She would also like to speak to PCP regarding symptoms. Please follow up.

## 2023-12-08 NOTE — TELEPHONE ENCOUNTER
Note done patient aware. Patient states she is still not feeling much better.  She said she is going to see how she does over the weekend and if not any better by Monday, she will call for an appointment

## 2023-12-12 ENCOUNTER — OFFICE VISIT (OUTPATIENT)
Dept: FAMILY MEDICINE CLINIC | Facility: CLINIC | Age: 53
End: 2023-12-12
Payer: COMMERCIAL

## 2023-12-12 VITALS
HEIGHT: 68 IN | SYSTOLIC BLOOD PRESSURE: 126 MMHG | BODY MASS INDEX: 36.34 KG/M2 | DIASTOLIC BLOOD PRESSURE: 76 MMHG | HEART RATE: 80 BPM | OXYGEN SATURATION: 96 % | TEMPERATURE: 97.3 F

## 2023-12-12 DIAGNOSIS — R73.09 ELEVATED RANDOM BLOOD GLUCOSE LEVEL: ICD-10-CM

## 2023-12-12 DIAGNOSIS — J21.0 RSV (ACUTE BRONCHIOLITIS DUE TO RESPIRATORY SYNCYTIAL VIRUS): Primary | ICD-10-CM

## 2023-12-12 PROBLEM — K52.9 CHRONIC DIARRHEA: Status: RESOLVED | Noted: 2020-07-06 | Resolved: 2023-12-12

## 2023-12-12 PROBLEM — R19.5 LOOSE STOOLS: Status: RESOLVED | Noted: 2020-02-07 | Resolved: 2023-12-12

## 2023-12-12 PROCEDURE — 99214 OFFICE O/P EST MOD 30 MIN: CPT | Performed by: NURSE PRACTITIONER

## 2023-12-12 RX ORDER — DEXTROMETHORPHAN HYDROBROMIDE AND PROMETHAZINE HYDROCHLORIDE 15; 6.25 MG/5ML; MG/5ML
5 SYRUP ORAL 4 TIMES DAILY PRN
Qty: 180 ML | Refills: 0 | Status: SHIPPED | OUTPATIENT
Start: 2023-12-12

## 2023-12-12 NOTE — PROGRESS NOTES
Assessment/Plan:     Diagnoses and all orders for this visit:    RSV (acute bronchiolitis due to respiratory syncytial virus)  -     promethazine-dextromethorphan (PHENERGAN-DM) 6.25-15 mg/5 mL oral syrup; Take 5 mL by mouth 4 (four) times a day as needed for cough    Elevated random blood glucose level  -     HEMOGLOBIN A1C W/ EAG ESTIMATION; Future        #1  Acute bronchiolitis due to RSV  Discussed with patient plan gave patient continue until completion her antibiotic and plan to add-on Promethazine DM to manage cough and congestion  #2  Elevated random blood glucose level  Patient is concerned that her blood glucose was elevated during her emergency visit recently so is requesting a hemoglobin A1c to be performed when she is feeling better to check for diabetes mellitus development  Patient instructed to call if no improvement in 72 hours or symptoms worsen    Subjective:      Patient ID: Davion Patino is a 47 y.o. female. 47 y. o.female presenting for a emergency room follow-up after being diagnosed with RSV. Patient was seen by her primary care provider on 12/4/2023 for chest congestion cough and wheezing. She was diagnosed with acute bronchitis and was prescribed a course of azithromycin, tapering dose of prednisone 10 mg, albuterol nebulizer and inhaler. Patient was also given a prescription for guaifenesin with codeine cough medicine. Patient states she was not feeling better with any of the previously ordered treatments, so she went to the emergency room on 12/06/2023. Patient does have a history of asthma and was having increased shortness of breath when she went to the emergency room along with a cough. Patient was given DuoNeb prescription along with benzonatate for cough. Patient reports that she has completed her antibiotics and prednisone that was given to her by her PCP.   Patient continues to have a cough and wanted to have her lungs listened to again which is the reason she made this appointment. Patient reports that she has returned to work but she continues with chronic fatigue. The following portions of the patient's history were reviewed and updated as appropriate: allergies, current medications, past family history, past medical history, past social history, past surgical history, and problem list.    Review of Systems   Constitutional:  Positive for fatigue. Negative for chills and fever. HENT:  Negative for congestion, ear pain, postnasal drip, rhinorrhea, sinus pressure, sinus pain and sore throat. Respiratory:  Positive for cough and wheezing. Negative for chest tightness and shortness of breath. Chest congestion   Cardiovascular: Negative. Gastrointestinal: Negative. Musculoskeletal: Negative. Neurological: Negative. Psychiatric/Behavioral: Negative. Objective:    /76 (BP Location: Right arm, Patient Position: Sitting, Cuff Size: Large)   Pulse 80   Temp (!) 97.3 °F (36.3 °C)   Ht 5' 8" (1.727 m)   LMP 12/27/2020 (Exact Date)   SpO2 96%   BMI 36.34 kg/m² (Reviewed)       Physical Exam  Vitals reviewed. Constitutional:       General: She is not in acute distress. Appearance: She is well-developed and well-groomed. She is not ill-appearing. HENT:      Head: Normocephalic and atraumatic. Right Ear: External ear normal.      Left Ear: External ear normal.      Nose: Nose normal.      Mouth/Throat:      Mouth: Mucous membranes are moist.      Pharynx: Oropharynx is clear. Eyes:      General: Lids are normal.      Extraocular Movements: Extraocular movements intact. Conjunctiva/sclera: Conjunctivae normal.      Pupils: Pupils are equal, round, and reactive to light. Neck:      Trachea: Trachea and phonation normal.   Cardiovascular:      Rate and Rhythm: Normal rate and regular rhythm. Heart sounds: Normal heart sounds. Pulmonary:      Effort: Pulmonary effort is normal. No respiratory distress.       Breath sounds: Rhonchi present. Chest:      Chest wall: No tenderness. Musculoskeletal:      Cervical back: Neck supple. Lymphadenopathy:      Cervical: No cervical adenopathy. Skin:     General: Skin is warm and dry. Neurological:      Mental Status: She is alert and oriented to person, place, and time. Psychiatric:         Mood and Affect: Mood normal.         Behavior: Behavior normal. Behavior is cooperative.

## 2023-12-12 NOTE — LETTER
December 12, 2023     Patient: Diana Rivera   YOB: 1970   Date of Visit: 12/12/2023       To Whom it May Concern:    Td Segal is currently under my professional care and was seen in the office on 12/12/2023. In my professional opinion being able to work remotely would assist in promoting quicker recover. If you have any questions or concerns, please don't hesitate to call.          Sincerely,          DACIA Velázquez        CC: No Recipients

## 2023-12-26 ENCOUNTER — APPOINTMENT (OUTPATIENT)
Dept: LAB | Facility: CLINIC | Age: 53
End: 2023-12-26
Payer: COMMERCIAL

## 2023-12-26 DIAGNOSIS — R73.09 ELEVATED RANDOM BLOOD GLUCOSE LEVEL: ICD-10-CM

## 2023-12-26 LAB
EST. AVERAGE GLUCOSE BLD GHB EST-MCNC: 117 MG/DL
HBA1C MFR BLD: 5.7 %

## 2023-12-26 PROCEDURE — 36415 COLL VENOUS BLD VENIPUNCTURE: CPT

## 2023-12-26 PROCEDURE — 83036 HEMOGLOBIN GLYCOSYLATED A1C: CPT

## 2024-01-11 ENCOUNTER — HOSPITAL ENCOUNTER (OUTPATIENT)
Dept: RADIOLOGY | Age: 54
Discharge: HOME | End: 2024-01-11
Payer: COMMERCIAL

## 2024-01-11 VITALS — BODY MASS INDEX: 36.22 KG/M2 | HEIGHT: 68 IN | WEIGHT: 239 LBS

## 2024-01-11 DIAGNOSIS — Z12.31 ENCOUNTER FOR SCREENING MAMMOGRAM FOR MALIGNANT NEOPLASM OF BREAST: ICD-10-CM

## 2024-01-11 PROCEDURE — 77067 SCR MAMMO BI INCL CAD: CPT

## 2024-01-11 PROCEDURE — 77063 BREAST TOMOSYNTHESIS BI: CPT

## 2024-04-12 ENCOUNTER — ANNUAL EXAM (OUTPATIENT)
Dept: OBGYN CLINIC | Facility: CLINIC | Age: 54
End: 2024-04-12
Payer: COMMERCIAL

## 2024-04-12 VITALS
HEIGHT: 68 IN | WEIGHT: 246 LBS | SYSTOLIC BLOOD PRESSURE: 130 MMHG | DIASTOLIC BLOOD PRESSURE: 76 MMHG | BODY MASS INDEX: 37.28 KG/M2

## 2024-04-12 DIAGNOSIS — Z01.419 WOMEN'S ANNUAL ROUTINE GYNECOLOGICAL EXAMINATION: Primary | ICD-10-CM

## 2024-04-12 DIAGNOSIS — Z12.31 ENCOUNTER FOR SCREENING MAMMOGRAM FOR MALIGNANT NEOPLASM OF BREAST: ICD-10-CM

## 2024-04-12 DIAGNOSIS — Z12.11 ENCOUNTER FOR SCREENING COLONOSCOPY: ICD-10-CM

## 2024-04-12 DIAGNOSIS — N95.1 MENOPAUSAL HOT FLUSHES: ICD-10-CM

## 2024-04-12 PROBLEM — F32.A DEPRESSION: Status: ACTIVE | Noted: 2024-03-21

## 2024-04-12 PROBLEM — F32.A DEPRESSION: Status: RESOLVED | Noted: 2024-03-21 | Resolved: 2024-04-12

## 2024-04-12 PROBLEM — E66.9 OBESITY: Status: ACTIVE | Noted: 2024-03-21

## 2024-04-12 PROBLEM — R73.03 PREDIABETES: Status: ACTIVE | Noted: 2024-03-21

## 2024-04-12 PROBLEM — J30.2 SEASONAL ALLERGIES: Status: ACTIVE | Noted: 2024-03-21

## 2024-04-12 PROBLEM — E66.3 OVERWEIGHT: Status: RESOLVED | Noted: 2019-10-18 | Resolved: 2024-04-12

## 2024-04-12 PROBLEM — J22 LOWER RESP. TRACT INFECTION: Status: RESOLVED | Noted: 2023-12-06 | Resolved: 2024-04-12

## 2024-04-12 PROCEDURE — S0612 ANNUAL GYNECOLOGICAL EXAMINA: HCPCS | Performed by: OBSTETRICS & GYNECOLOGY

## 2024-04-12 RX ORDER — ESTRADIOL 0.03 MG/D
1 FILM, EXTENDED RELEASE TRANSDERMAL 2 TIMES WEEKLY
Qty: 8 PATCH | Refills: 0 | Status: SHIPPED | OUTPATIENT
Start: 2024-04-15 | End: 2024-05-15

## 2024-04-12 RX ORDER — BUPROPION HYDROCHLORIDE 75 MG/1
75 TABLET ORAL 2 TIMES DAILY
COMMUNITY
Start: 2024-03-21 | End: 2024-04-20
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
## 2024-05-04 DIAGNOSIS — N95.1 MENOPAUSAL HOT FLUSHES: ICD-10-CM

## 2024-05-06 RX ORDER — ESTRADIOL 0.03 MG/D
FILM, EXTENDED RELEASE TRANSDERMAL
Qty: 24 PATCH | Refills: 0 | Status: SHIPPED | OUTPATIENT
Start: 2024-05-06 | End: 2024-05-08

## 2024-05-07 DIAGNOSIS — N95.1 MENOPAUSAL HOT FLUSHES: ICD-10-CM

## 2024-05-07 NOTE — TELEPHONE ENCOUNTER
Spoke with pt, she is not interested in using patch at this moment, she is going to take a break and see how she feels. Also, pt spoke with PCP and they switched her from Lexapro to Wellbutrin.     Pt was notified to reach out to [facility] if she would like an appt to discuss patch further if needed. At this time pt, will wait and concur with PCP.

## 2024-05-08 RX ORDER — ESTRADIOL 0.03 MG/D
FILM, EXTENDED RELEASE TRANSDERMAL
Qty: 24 PATCH | Refills: 0 | Status: SHIPPED | OUTPATIENT
Start: 2024-05-08

## 2024-05-23 ENCOUNTER — OFFICE VISIT (OUTPATIENT)
Dept: GASTROENTEROLOGY | Facility: CLINIC | Age: 54
End: 2024-05-23
Payer: COMMERCIAL

## 2024-05-23 VITALS
HEIGHT: 68 IN | WEIGHT: 245 LBS | BODY MASS INDEX: 37.13 KG/M2 | TEMPERATURE: 97.2 F | SYSTOLIC BLOOD PRESSURE: 120 MMHG | DIASTOLIC BLOOD PRESSURE: 78 MMHG

## 2024-05-23 DIAGNOSIS — R19.4 CHANGE IN BOWEL HABIT: Primary | ICD-10-CM

## 2024-05-23 DIAGNOSIS — R19.7 DIARRHEA, UNSPECIFIED TYPE: ICD-10-CM

## 2024-05-23 DIAGNOSIS — K52.832 LYMPHOCYTIC COLITIS: ICD-10-CM

## 2024-05-23 PROCEDURE — 99204 OFFICE O/P NEW MOD 45 MIN: CPT | Performed by: PHYSICIAN ASSISTANT

## 2024-05-23 NOTE — PATIENT INSTRUCTIONS
Start Metamucil powder OTC once daily to help bulk and regulate stools     Scheduled date of colonoscopy (as of today):05.30.24  Physician performing colonoscopy:DR HERNANDEZ  Location of colonoscopy:  Bowel prep reviewed with patient:ALESIA/WADE  Instructions reviewed with patient by:ERNA  Clearances: N/A

## 2024-05-23 NOTE — PROGRESS NOTES
[facility] - Outpatient Consultation New Patient  Xenia Jones 54 y.o. female MRN: [id number]  Encounter: [id number]    ASSESSMENT AND PLAN:    1. Change in bowel habit  2. Diarrhea, unspecified type  3. Lymphocytic colitis  Patient with a history of lymphocytic colitis presenting with 8 months of significant change in bowel habit, worsening diarrhea.  She states that her current symptoms are worse/different when compared to her previous episode of lymphocytic colitis.    We reviewed recent CBC, CMP, TSH which were all normal/unremarkable  She had previous normal/negative duodenal biopsies for celiac disease in 2020  At this time we will order stool studies including fecal calprotectin and fecal elastase  Will plan for colonoscopy with TI intubation and pancolonic biopsies for further evaluation of ongoing symptoms  Recommended patient keep a food and symptom diary in order to identify possible trigger foods.  We discussed the importance of hydration.  I recommended she start Metamucil powder over-the-counter once daily to help bulk and regulate stools she will do    - Ambulatory Referral to Gastroenterology  - Colonoscopy; Future  - Ova and parasite examination; Future  - Giardia antigen; Future  - Stool Enteric Bacterial Panel by PCR; Future  - Clostridium difficile toxin by PCR with EIA; Future  - Cryptosporidium Smear; Future  - Calprotectin,Fecal; Future  - Pancreatic elastase, fecal; Future      I obtained informed consent from the patient the risk/benefits/alternatives of the procedure/s were discussed with the patient.  Risks included, but not limited to, infection, bleeding, perforation, injury to organs in the abdomen, missed lesion and incomplete procedure were discussed.  Patient was agreeable and electronic signature was obtained.    Patient was instructed to call the office with any questions, concerns, new/ worsening/ persisting GI symptoms. Advised patient go to the ER  with any severe or worsening abdominal pain, fevers/ chills, intractable N/V, chest pain, SOB, dizziness, lightheadedness, feeling something stuck in esophagus that will not go down. Patient expressed understanding and is in agreement with treatment plan.       Will plan to follow up after diagnostic tests   ________________________________________________________    HPI:      Patient with a past medical history of depression, prediabetes, allergies, history of lymphocytic colitis presents to the office today as a referral to discuss colonoscopy.    Patient reports feeling OK overall.   She notes change in bowel habit x 8 months.   She is having 5-7 loose, explosive stools/ day. Normally patient has ~ 3 Bms/ day.    There is urgency with Bms and nocturnal stools. No blood in stool.   She has lower abdominal pain /cramping prior to Bms that resolves after Bms   She states her current symptoms feel worse compared to previous episode of microscopic colitis.   Patient I snot taking anything to help with BMs  Patient denies any fevers/ chills, unintentional weight loss, decreased appetite, nausea, vomiting, black or bloody stools, heartburn, dysphagia, odynophagia.   Denies chest pain, SOB    No family history of colon cancer   She denies recent changes in diet medications or travel     Tobacco use- None  Alcohol use- None  NSAID use- Rare       REVIEW OF SYSTEMS:    Review of Systems   Constitutional:  Negative for chills and fever.   HENT:  Negative for ear pain and sore throat.    Eyes:  Negative for pain and visual disturbance.   Respiratory:  Negative for cough and shortness of breath.    Cardiovascular:  Negative for chest pain and palpitations.   Gastrointestinal:  Positive for abdominal pain and diarrhea. Negative for anal bleeding, constipation, nausea and vomiting.   Genitourinary:  Negative for dysuria, frequency and hematuria.   Musculoskeletal:  Negative for arthralgias, back pain and myalgias.   Skin:   Negative for color change and rash.   Neurological:  Negative for seizures, syncope and headaches.   All other systems reviewed and are negative.       Historical Information   Past Medical History:   Diagnosis Date    Anxiety     Asthma     no current issues    Colitis     Dysfunctional uterine bleeding     H/O total vaginal hysterectomy 2021    S/P left oophorectomy 2021     Past Surgical History:   Procedure Laterality Date    DILATION AND CURETTAGE OF UTERUS      DILATION AND CURETTAGE OF UTERUS N/A 2017    Procedure: DILATATION AND CURETTAGE (D&C);  Surgeon: Alexy Gamez MD;  [facility];  Service:     ENDOMETRIAL ABLATION N/A 2017    Procedure: ABLATION ENDOMETRIAL NOVASURE;  Surgeon: Alexy Gamez MD;  [facility];  Service:     HYSTERECTOMY  2021    INDUCED       By Dilation and Evacuation    OOPHORECTOMY Left 2021    Procedure: OOPHORECTOMY, LAPAROSCOPIC;  Surgeon: Kandy Marquez MD;  [facility];  Service: Gynecology    MD CYSTOURETHROSCOPY N/A 2021    Procedure: CYSTOSCOPY;  Surgeon: Kandy Marquez MD;  [facility];  Service: Gynecology    MD LAPS ABD PRTM&OMENTUM DX W/WO SPEC BR/WA SPX N/A 2021    Procedure: LAPAROSCOPY DIAGNOSTIC;  Surgeon: Kandy Marquez MD;  [facility];  Service: Gynecology    MD VAG HYST 250 GM/< W/RMVL TUBE&/OVARY N/A 2021    Procedure: HYSTERECTOMY VAGINAL TOTAL (TVH) WITH BILATERAL SALPINGECTOMY;  Surgeon: Kandy Marquez MD;  [facility];  Service: Gynecology     Social History   Social History     Substance and Sexual Activity   Alcohol Use Yes    Alcohol/week: 2.0 standard drinks of alcohol    Comment: socially     Social History     Substance and Sexual Activity   Drug Use Never     Social History     Tobacco Use   Smoking Status Former    Current packs/day: 0.00    Average packs/day: 1 pack/day for 15.2 years (15.2 ttl pk-yrs)    Types: Cigarettes     Start date: 1986    Quit date: 2001    Years since quittin.8    Passive exposure: Never   Smokeless Tobacco Never     Family History   Problem Relation Age of Onset    Hypothyroidism Mother     Lung cancer Mother 65    Cancer Mother     Heart disease Father     Venous thrombosis Father         of Deep Vessels of Lower Extremity    Aortic aneurysm Father     Breast cancer Paternal Grandmother 60    Hypertension Paternal Grandfather     Breast cancer Maternal Aunt 50    No Known Problems Maternal Aunt     No Known Problems Maternal Aunt     Breast cancer Paternal Aunt 50    Breast cancer Paternal Aunt 50    No Known Problems Paternal Aunt     No Known Problems Paternal Aunt     Esophageal cancer Paternal Uncle 63    Diabetes Family     Cancer Family        Meds/Allergies       Current Outpatient Medications:     Cyanocobalamin (VITAMIN B 12 PO)    escitalopram (LEXAPRO) 20 mg tablet    estradiol (VIVELLE-DOT) 0.025 MG/24HR    metFORMIN (GLUCOPHAGE) 500 mg tablet    No Known Allergies        Objective   Wt Readings from Last 3 Encounters:   24 111 kg (245 lb)   24 112 kg (246 lb)   24 108 kg (239 lb)     Temp Readings from Last 3 Encounters:   24 (!) 97.2 °F (36.2 °C) (Tympanic)   23 (!) 97.3 °F (36.3 °C)   23 98.6 °F (37 °C) (Oral)     BP Readings from Last 3 Encounters:   24 120/78   24 130/76   23 126/76     Pulse Readings from Last 3 Encounters:   23 80   23 61   23 68        PHYSICAL EXAM:     Physical Exam  Vitals reviewed.   Constitutional:       General: She is not in acute distress.     Appearance: She is not toxic-appearing.   HENT:      Head: Normocephalic and atraumatic.   Eyes:      Extraocular Movements: Extraocular movements intact.      Conjunctiva/sclera: Conjunctivae normal.   Cardiovascular:      Rate and Rhythm: Normal rate and regular rhythm.   Pulmonary:      Effort: Pulmonary effort is normal. No respiratory  distress.      Breath sounds: Normal breath sounds.   Abdominal:      General: Bowel sounds are normal.      Palpations: Abdomen is soft.      Tenderness: There is no abdominal tenderness.   Musculoskeletal:         General: No swelling or tenderness.      Cervical back: Normal range of motion and neck supple.   Skin:     General: Skin is warm and dry.      Coloration: Skin is not jaundiced.   Neurological:      General: No focal deficit present.      Mental Status: She is alert and oriented to person, place, and time. Mental status is at baseline.   Psychiatric:         Mood and Affect: Mood normal.         Behavior: Behavior normal.         Thought Content: Thought content normal.         Lab Results:   No visits with results within 1 Day(s) from this visit.   Latest known visit with results is:   Appointment on 12/26/2023   Component Date Value    Hemoglobin A1C 12/26/2023 5.7 (H)     EAG 12/26/2023 117      CBC from 3/29/2024 reviewed in Care Everywhere, done at [facility], this was normal and negative for anemia or leukocytosis.  TSH normal too from that date      Lab Results   Component Value Date    SODIUM 142 03/29/2024    K 4.1 03/29/2024     03/29/2024    CO2 25 03/29/2024    AGAP 10 03/29/2024    BUN 14 03/29/2024    CREATININE 0.93 03/29/2024    GLUC 100 (H) 03/29/2024    GLUF 104 (H) 07/22/2023    CALCIUM 10.0 03/29/2024    AST 15 03/29/2024    ALT 25 03/29/2024    ALKPHOS 93 03/29/2024    TP 6.6 03/29/2024    TBILI 0.7 03/29/2024    EGFR 74 03/29/2024       Prior Procedure Results/Reports   Last EGD reviewed done 8/14/2020 and was completely normal.  Duodenal biopsy was normal and negative for celiac disease.      Last Colonoscopy reviewed good 8/14/2020 for diarrhea and showed normal terminal ileum, rectal tubulovillous adenomatous polyp which was removed, otherwise completely normal colonoscopy.  Random colon biopsy was suggestive for lymphocytic colitis.  It was recommended patient undergo repeat  colonoscopy in 3 years due to personal history of colon polyps.      Donna Simmons PA-C   Available on [employer]

## 2024-05-28 ENCOUNTER — APPOINTMENT (OUTPATIENT)
Dept: LAB | Facility: CLINIC | Age: 54
End: 2024-05-28
Payer: COMMERCIAL

## 2024-05-28 DIAGNOSIS — R19.7 DIARRHEA, UNSPECIFIED TYPE: ICD-10-CM

## 2024-05-28 PROCEDURE — 82653 EL-1 FECAL QUANTITATIVE: CPT

## 2024-05-28 PROCEDURE — 83993 ASSAY FOR CALPROTECTIN FECAL: CPT

## 2024-05-28 PROCEDURE — 87329 GIARDIA AG IA: CPT

## 2024-05-28 PROCEDURE — 87206 SMEAR FLUORESCENT/ACID STAI: CPT

## 2024-05-28 PROCEDURE — 87209 SMEAR COMPLEX STAIN: CPT

## 2024-05-28 PROCEDURE — 87177 OVA AND PARASITES SMEARS: CPT

## 2024-05-28 PROCEDURE — 87505 NFCT AGENT DETECTION GI: CPT

## 2024-05-28 PROCEDURE — 87015 SPECIMEN INFECT AGNT CONCNTJ: CPT

## 2024-05-28 PROCEDURE — 87493 C DIFF AMPLIFIED PROBE: CPT

## 2024-05-29 ENCOUNTER — TELEPHONE (OUTPATIENT)
Dept: GASTROENTEROLOGY | Facility: HOSPITAL | Age: 54
End: 2024-05-29

## 2024-05-29 LAB
C COLI+JEJUNI TUF STL QL NAA+PROBE: NEGATIVE
C DIFF TOX GENS STL QL NAA+PROBE: NEGATIVE
EC STX1+STX2 GENES STL QL NAA+PROBE: NEGATIVE
G LAMBLIA AG STL QL IA: NEGATIVE
SALMONELLA SP SPAO STL QL NAA+PROBE: NEGATIVE
SHIGELLA SP+EIEC IPAH STL QL NAA+PROBE: NEGATIVE

## 2024-05-30 ENCOUNTER — ANESTHESIA EVENT (OUTPATIENT)
Dept: GASTROENTEROLOGY | Facility: HOSPITAL | Age: 54
End: 2024-05-30

## 2024-05-30 ENCOUNTER — ANESTHESIA (OUTPATIENT)
Dept: GASTROENTEROLOGY | Facility: HOSPITAL | Age: 54
End: 2024-05-30

## 2024-05-30 ENCOUNTER — HOSPITAL ENCOUNTER (OUTPATIENT)
Dept: GASTROENTEROLOGY | Facility: HOSPITAL | Age: 54
Setting detail: OUTPATIENT SURGERY
End: 2024-05-30
Payer: COMMERCIAL

## 2024-05-30 VITALS
SYSTOLIC BLOOD PRESSURE: 155 MMHG | TEMPERATURE: 97.3 F | OXYGEN SATURATION: 97 % | HEART RATE: 57 BPM | DIASTOLIC BLOOD PRESSURE: 80 MMHG | RESPIRATION RATE: 12 BRPM

## 2024-05-30 DIAGNOSIS — R19.4 CHANGE IN BOWEL HABIT: ICD-10-CM

## 2024-05-30 DIAGNOSIS — R19.7 DIARRHEA, UNSPECIFIED TYPE: ICD-10-CM

## 2024-05-30 LAB
CRYPTOSP STL QL ACID FAST STN: NORMAL
ELASTASE PANC STL-MCNT: 337 UG ELAST./G
I BELLI SPEC QL ACID FAST MOD KINY STN: NORMAL

## 2024-05-30 PROCEDURE — 88305 TISSUE EXAM BY PATHOLOGIST: CPT | Performed by: STUDENT IN AN ORGANIZED HEALTH CARE EDUCATION/TRAINING PROGRAM

## 2024-05-30 PROCEDURE — 45385 COLONOSCOPY W/LESION REMOVAL: CPT | Performed by: INTERNAL MEDICINE

## 2024-05-30 RX ORDER — SODIUM CHLORIDE, SODIUM LACTATE, POTASSIUM CHLORIDE, CALCIUM CHLORIDE 600; 310; 30; 20 MG/100ML; MG/100ML; MG/100ML; MG/100ML
50 INJECTION, SOLUTION INTRAVENOUS CONTINUOUS
Status: DISCONTINUED | OUTPATIENT
Start: 2024-05-30 | End: 2024-06-03 | Stop reason: HOSPADM

## 2024-05-30 RX ORDER — PROPOFOL 10 MG/ML
INJECTION, EMULSION INTRAVENOUS AS NEEDED
Status: DISCONTINUED | OUTPATIENT
Start: 2024-05-30 | End: 2024-05-30

## 2024-05-30 RX ORDER — LIDOCAINE HYDROCHLORIDE 10 MG/ML
INJECTION, SOLUTION EPIDURAL; INFILTRATION; INTRACAUDAL; PERINEURAL AS NEEDED
Status: DISCONTINUED | OUTPATIENT
Start: 2024-05-30 | End: 2024-05-30

## 2024-05-30 RX ADMIN — PROPOFOL 40 MG: 10 INJECTION, EMULSION INTRAVENOUS at 12:43

## 2024-05-30 RX ADMIN — LIDOCAINE HYDROCHLORIDE 50 MG: 10 INJECTION, SOLUTION EPIDURAL; INFILTRATION; INTRACAUDAL; PERINEURAL at 12:41

## 2024-05-30 RX ADMIN — PROPOFOL 30 MG: 10 INJECTION, EMULSION INTRAVENOUS at 12:44

## 2024-05-30 RX ADMIN — PROPOFOL 20 MG: 10 INJECTION, EMULSION INTRAVENOUS at 12:50

## 2024-05-30 RX ADMIN — PROPOFOL 20 MG: 10 INJECTION, EMULSION INTRAVENOUS at 12:55

## 2024-05-30 RX ADMIN — PROPOFOL 40 MG: 10 INJECTION, EMULSION INTRAVENOUS at 12:47

## 2024-05-30 RX ADMIN — PROPOFOL 120 MG: 10 INJECTION, EMULSION INTRAVENOUS at 12:41

## 2024-05-30 RX ADMIN — PROPOFOL 30 MG: 10 INJECTION, EMULSION INTRAVENOUS at 12:45

## 2024-05-30 RX ADMIN — PROPOFOL 40 MG: 10 INJECTION, EMULSION INTRAVENOUS at 12:48

## 2024-05-30 RX ADMIN — SODIUM CHLORIDE, SODIUM LACTATE, POTASSIUM CHLORIDE, AND CALCIUM CHLORIDE 50 ML/HR: .6; .31; .03; .02 INJECTION, SOLUTION INTRAVENOUS at 12:19

## 2024-05-30 RX ADMIN — PROPOFOL 20 MG: 10 INJECTION, EMULSION INTRAVENOUS at 12:52

## 2024-05-30 NOTE — ANESTHESIA PREPROCEDURE EVALUATION
Procedure:  COLONOSCOPY    Relevant Problems   NEURO/PSYCH   (+) Major depressive disorder with single episode, in full remission (HCC)      PULMONARY   (+) VENKATA (obstructive sleep apnea)          Physical Exam    Airway    Mallampati score: II  TM Distance: >3 FB  Neck ROM: full     Dental   No notable dental hx     Cardiovascular  Rhythm: regular, Rate: normal, No murmur    Pulmonary   Breath sounds clear to auscultation    Other Findings  post-pubertal.      Anesthesia Plan  ASA Score- 2     Anesthesia Type- IV sedation with anesthesia with ASA Monitors.         Additional Monitors:     Airway Plan:            Plan Factors-    Chart reviewed.    Patient summary reviewed.                  Induction-     Postoperative Plan-         Informed Consent- Anesthetic plan and risks discussed with patient and spouse.  I personally reviewed this patient with the CRNA. Discussed and agreed on the Anesthesia Plan with the CRNA..

## 2024-05-30 NOTE — ANESTHESIA POSTPROCEDURE EVALUATION
Post-Op Assessment Note    CV Status:  Stable    Pain management: satisfactory to patient       Mental Status:  Alert and awake   Hydration Status:  Stable   PONV Controlled:  None   Airway Patency:  Patent     Post Op Vitals Reviewed: Yes    No anethesia notable event occurred.    Staff: KIM               /72 (05/30/24 1309)    Temp (!) 97.3 °F (36.3 °C) (05/30/24 1309)    Pulse 64 (05/30/24 1309)   Resp 15 (05/30/24 1309)    SpO2 96 % (05/30/24 1309)

## 2024-05-30 NOTE — H&P
History and Physical - [facility]  Xenia Jones 54 y.o. female MRN: [id number]                  HPI: Xenia Jones is a 54 y.o. year old female who presents for colonoscopy due to history of colon polyps and also history of microcytic colitis with worsening of her symptoms.        REVIEW OF SYSTEMS: Per the HPI, and otherwise unremarkable.    Historical Information   Past Medical History:   Diagnosis Date    Anxiety     Asthma     no current issues    Colitis     Dysfunctional uterine bleeding     H/O total vaginal hysterectomy 2021    S/P left oophorectomy 2021     Past Surgical History:   Procedure Laterality Date    DILATION AND CURETTAGE OF UTERUS      DILATION AND CURETTAGE OF UTERUS N/A 2017    Procedure: DILATATION AND CURETTAGE (D&C);  Surgeon: Alexy Gamez MD;  [facility];  Service:     ENDOMETRIAL ABLATION N/A 2017    Procedure: ABLATION ENDOMETRIAL NOVASURE;  Surgeon: Alexy Gamez MD;  [facility];  Service:     HYSTERECTOMY  2021    INDUCED       By Dilation and Evacuation    OOPHORECTOMY Left 2021    Procedure: OOPHORECTOMY, LAPAROSCOPIC;  Surgeon: Kandy Marquez MD;  [facility];  Service: Gynecology    MI CYSTOURETHROSCOPY N/A 2021    Procedure: CYSTOSCOPY;  Surgeon: Kandy Marquez MD;  [facility];  Service: Gynecology    MI LAPS ABD PRTM&OMENTUM DX W/WO SPEC BR/WA SPX N/A 2021    Procedure: LAPAROSCOPY DIAGNOSTIC;  Surgeon: Kandy Marquez MD;  [facility];  Service: Gynecology    MI VAG HYST 250 GM/< W/RMVL TUBE&/OVARY N/A 2021    Procedure: HYSTERECTOMY VAGINAL TOTAL (TVH) WITH BILATERAL SALPINGECTOMY;  Surgeon: Kandy Marquez MD;  [facility];  Service: Gynecology     Social History   Social History     Substance and Sexual Activity   Alcohol Use Yes    Alcohol/week: 2.0 standard drinks of alcohol    Comment: socially     Social History      Substance and Sexual Activity   Drug Use Never     Social History     Tobacco Use   Smoking Status Former    Current packs/day: 0.00    Average packs/day: 1 pack/day for 15.2 years (15.2 ttl pk-yrs)    Types: Cigarettes    Start date: 1986    Quit date: 2001    Years since quittin.8    Passive exposure: Never   Smokeless Tobacco Never     Family History   Problem Relation Age of Onset    Hypothyroidism Mother     Lung cancer Mother 65    Cancer Mother     Heart disease Father     Venous thrombosis Father         of Deep Vessels of Lower Extremity    Aortic aneurysm Father     Breast cancer Paternal Grandmother 60    Hypertension Paternal Grandfather     Breast cancer Maternal Aunt 50    No Known Problems Maternal Aunt     No Known Problems Maternal Aunt     Breast cancer Paternal Aunt 50    Breast cancer Paternal Aunt 50    No Known Problems Paternal Aunt     No Known Problems Paternal Aunt     Esophageal cancer Paternal Uncle 63    Diabetes Family     Cancer Family        Meds/Allergies       Current Outpatient Medications:     Cyanocobalamin (VITAMIN B 12 PO)    escitalopram (LEXAPRO) 20 mg tablet    estradiol (VIVELLE-DOT) 0.025 MG/24HR    metFORMIN (GLUCOPHAGE) 500 mg tablet    No Known Allergies    Objective     LMP 2020 (Exact Date)       PHYSICAL EXAM    Gen: NAD  Head: NCAT  CV: RRR  CHEST: Clear  ABD: soft, NT/ND  EXT: no edema      ASSESSMENT/PLAN:  This is a 54 y.o. year old female here for colonoscopy, and she is stable and optimized for her procedure.

## 2024-06-03 PROCEDURE — 88305 TISSUE EXAM BY PATHOLOGIST: CPT | Performed by: STUDENT IN AN ORGANIZED HEALTH CARE EDUCATION/TRAINING PROGRAM

## 2024-06-03 NOTE — RESULT ENCOUNTER NOTE
Stool studies negative for infection, fecal elastase within normal limits.  Awaiting fecal calprotectin to result

## 2024-06-04 LAB — CALPROTECTIN STL-MCNT: 13 UG/G (ref 0–120)

## 2024-06-05 ENCOUNTER — NURSE TRIAGE (OUTPATIENT)
Age: 54
End: 2024-06-05

## 2024-06-05 DIAGNOSIS — K52.832 LYMPHOCYTIC COLITIS: Primary | ICD-10-CM

## 2024-06-05 RX ORDER — MESALAMINE 1.2 G/1
4800 TABLET, DELAYED RELEASE ORAL
Qty: 360 TABLET | Refills: 3 | Status: SHIPPED | OUTPATIENT
Start: 2024-06-05

## 2024-06-05 NOTE — TELEPHONE ENCOUNTER
" SPOKE WITH PT, REVIEWED COLONOSCOPY BIOPSY RESULTS, MICROSCOPIC COLITIS. PT REPORTS PERSISTENT DIARRHEA EPISODES 5-7 TIMES DAILY, ABDOMINAL CRAMPING AND BORBORYGMI. PT IS INTERESTED IN TREATMENT AND DIET RECOMMENDATIONS. THANK YOU.          Reason for Disposition   Information only question and nurse able to answer    Answer Assessment - Initial Assessment Questions  1. REASON FOR CALL or QUESTION: \"What is your reason for calling today?\" or \"How can I best help you?\" or \"What question do you have that I can help answer?\"      SPOKE WITH PT, REVIEWED COLONOSCOPY BIOPSY RESULTS, MICROSCOPIC COLITIS. PT REPORTS PERSISTENT DIARRHEA EPISODES 5-7 TIMES DAILY, ABDOMINAL CRAMPING AND BORBORYGMI. PT IS INTERESTED IN TREATMENT AND DIET RECOMMENDATIONS. THANK YOU.    Protocols used: Information Only Call - No Triage-ADULT-[place]    "

## 2024-06-05 NOTE — TELEPHONE ENCOUNTER
I spoke to pt to get her treated for recently diagnosed microscopic colitis.  She is in agreement to start mesalamine.  Prescription sent [facility] her [facility].

## 2024-06-19 ENCOUNTER — HOSPITAL ENCOUNTER (INPATIENT)
Facility: HOSPITAL | Age: 54
LOS: 4 days | Discharge: HOME | DRG: 392 | End: 2024-06-23
Attending: EMERGENCY MEDICINE | Admitting: INTERNAL MEDICINE
Payer: COMMERCIAL

## 2024-06-19 ENCOUNTER — APPOINTMENT (EMERGENCY)
Dept: CT IMAGING | Facility: HOSPITAL | Age: 54
DRG: 392 | End: 2024-06-19
Payer: COMMERCIAL

## 2024-06-19 DIAGNOSIS — K51.00 PANCOLITIS (HCC): Primary | ICD-10-CM

## 2024-06-19 DIAGNOSIS — K52.832 LYMPHOCYTIC COLITIS: ICD-10-CM

## 2024-06-19 PROBLEM — E06.3 HASHIMOTO'S THYROIDITIS: Status: ACTIVE | Noted: 2024-06-19

## 2024-06-19 LAB
ALBUMIN SERPL BCG-MCNC: 4.4 G/DL (ref 3.5–5)
ALP SERPL-CCNC: 97 U/L (ref 34–104)
ALT SERPL W P-5'-P-CCNC: 27 U/L (ref 7–52)
ANION GAP SERPL CALCULATED.3IONS-SCNC: 10 MMOL/L (ref 4–13)
AST SERPL W P-5'-P-CCNC: 19 U/L (ref 13–39)
BACTERIA UR QL AUTO: ABNORMAL /HPF
BASOPHILS # BLD AUTO: 0.02 THOUSANDS/ÂΜL (ref 0–0.1)
BASOPHILS NFR BLD AUTO: 0 % (ref 0–1)
BILIRUB SERPL-MCNC: 0.91 MG/DL (ref 0.2–1)
BILIRUB UR QL STRIP: NEGATIVE
BUN SERPL-MCNC: 13 MG/DL (ref 5–25)
CALCIUM SERPL-MCNC: 10 MG/DL (ref 8.4–10.2)
CARDIAC TROPONIN I PNL SERPL HS: <2 NG/L
CHLORIDE SERPL-SCNC: 105 MMOL/L (ref 96–108)
CLARITY UR: CLEAR
CO2 SERPL-SCNC: 21 MMOL/L (ref 21–32)
COLOR UR: ABNORMAL
CREAT SERPL-MCNC: 0.94 MG/DL (ref 0.6–1.3)
CRP SERPL QL: 22.7 MG/L
EOSINOPHIL # BLD AUTO: 0.2 THOUSAND/ÂΜL (ref 0–0.61)
EOSINOPHIL NFR BLD AUTO: 3 % (ref 0–6)
ERYTHROCYTE [DISTWIDTH] IN BLOOD BY AUTOMATED COUNT: 12.6 % (ref 11.6–15.1)
GFR SERPL CREATININE-BSD FRML MDRD: 69 ML/MIN/1.73SQ M
GLUCOSE SERPL-MCNC: 138 MG/DL (ref 65–140)
GLUCOSE UR STRIP-MCNC: NEGATIVE MG/DL
HCT VFR BLD AUTO: 47.3 % (ref 34.8–46.1)
HGB BLD-MCNC: 16.2 G/DL (ref 11.5–15.4)
HGB UR QL STRIP.AUTO: NEGATIVE
IMM GRANULOCYTES # BLD AUTO: 0.02 THOUSAND/UL (ref 0–0.2)
IMM GRANULOCYTES NFR BLD AUTO: 0 % (ref 0–2)
KETONES UR STRIP-MCNC: ABNORMAL MG/DL
LACTATE SERPL-SCNC: 1.8 MMOL/L (ref 0.5–2)
LEUKOCYTE ESTERASE UR QL STRIP: NEGATIVE
LIPASE SERPL-CCNC: 12 U/L (ref 11–82)
LYMPHOCYTES # BLD AUTO: 1.02 THOUSANDS/ÂΜL (ref 0.6–4.47)
LYMPHOCYTES NFR BLD AUTO: 15 % (ref 14–44)
MCH RBC QN AUTO: 29.3 PG (ref 26.8–34.3)
MCHC RBC AUTO-ENTMCNC: 34.2 G/DL (ref 31.4–37.4)
MCV RBC AUTO: 86 FL (ref 82–98)
MONOCYTES # BLD AUTO: 0.81 THOUSAND/ÂΜL (ref 0.17–1.22)
MONOCYTES NFR BLD AUTO: 12 % (ref 4–12)
MUCOUS THREADS UR QL AUTO: ABNORMAL
NEUTROPHILS # BLD AUTO: 4.64 THOUSANDS/ÂΜL (ref 1.85–7.62)
NEUTS SEG NFR BLD AUTO: 70 % (ref 43–75)
NITRITE UR QL STRIP: NEGATIVE
NON-SQ EPI CELLS URNS QL MICRO: ABNORMAL /HPF
NRBC BLD AUTO-RTO: 0 /100 WBCS
PH UR STRIP.AUTO: 6 [PH]
PLATELET # BLD AUTO: 350 THOUSANDS/UL (ref 149–390)
PMV BLD AUTO: 10.7 FL (ref 8.9–12.7)
POTASSIUM SERPL-SCNC: 3.5 MMOL/L (ref 3.5–5.3)
PROT SERPL-MCNC: 7.2 G/DL (ref 6.4–8.4)
PROT UR STRIP-MCNC: ABNORMAL MG/DL
RBC # BLD AUTO: 5.52 MILLION/UL (ref 3.81–5.12)
RBC #/AREA URNS AUTO: ABNORMAL /HPF
SODIUM SERPL-SCNC: 136 MMOL/L (ref 135–147)
SP GR UR STRIP.AUTO: >=1.05 (ref 1–1.03)
TSH SERPL DL<=0.05 MIU/L-ACNC: 2.79 UIU/ML (ref 0.45–4.5)
UROBILINOGEN UR STRIP-ACNC: <2 MG/DL
WBC # BLD AUTO: 6.71 THOUSAND/UL (ref 4.31–10.16)
WBC #/AREA URNS AUTO: ABNORMAL /HPF

## 2024-06-19 PROCEDURE — 85025 COMPLETE CBC W/AUTO DIFF WBC: CPT | Performed by: EMERGENCY MEDICINE

## 2024-06-19 PROCEDURE — 83993 ASSAY FOR CALPROTECTIN FECAL: CPT | Performed by: EMERGENCY MEDICINE

## 2024-06-19 PROCEDURE — 87329 GIARDIA AG IA: CPT | Performed by: EMERGENCY MEDICINE

## 2024-06-19 PROCEDURE — 83605 ASSAY OF LACTIC ACID: CPT | Performed by: EMERGENCY MEDICINE

## 2024-06-19 PROCEDURE — 96360 HYDRATION IV INFUSION INIT: CPT

## 2024-06-19 PROCEDURE — 87505 NFCT AGENT DETECTION GI: CPT | Performed by: EMERGENCY MEDICINE

## 2024-06-19 PROCEDURE — 99223 1ST HOSP IP/OBS HIGH 75: CPT | Performed by: INTERNAL MEDICINE

## 2024-06-19 PROCEDURE — 74177 CT ABD & PELVIS W/CONTRAST: CPT

## 2024-06-19 PROCEDURE — 81001 URINALYSIS AUTO W/SCOPE: CPT | Performed by: EMERGENCY MEDICINE

## 2024-06-19 PROCEDURE — 36415 COLL VENOUS BLD VENIPUNCTURE: CPT

## 2024-06-19 PROCEDURE — 86140 C-REACTIVE PROTEIN: CPT | Performed by: EMERGENCY MEDICINE

## 2024-06-19 PROCEDURE — 80053 COMPREHEN METABOLIC PANEL: CPT | Performed by: EMERGENCY MEDICINE

## 2024-06-19 PROCEDURE — 83690 ASSAY OF LIPASE: CPT | Performed by: EMERGENCY MEDICINE

## 2024-06-19 PROCEDURE — 93005 ELECTROCARDIOGRAM TRACING: CPT

## 2024-06-19 PROCEDURE — 99285 EMERGENCY DEPT VISIT HI MDM: CPT

## 2024-06-19 PROCEDURE — 84484 ASSAY OF TROPONIN QUANT: CPT | Performed by: EMERGENCY MEDICINE

## 2024-06-19 PROCEDURE — 99285 EMERGENCY DEPT VISIT HI MDM: CPT | Performed by: EMERGENCY MEDICINE

## 2024-06-19 PROCEDURE — 84443 ASSAY THYROID STIM HORMONE: CPT | Performed by: EMERGENCY MEDICINE

## 2024-06-19 RX ORDER — SODIUM CHLORIDE 9 MG/ML
150 INJECTION, SOLUTION INTRAVENOUS CONTINUOUS
Status: DISCONTINUED | OUTPATIENT
Start: 2024-06-19 | End: 2024-06-20

## 2024-06-19 RX ORDER — ONDANSETRON 2 MG/ML
4 INJECTION INTRAMUSCULAR; INTRAVENOUS EVERY 6 HOURS PRN
Status: DISCONTINUED | OUTPATIENT
Start: 2024-06-19 | End: 2024-06-23 | Stop reason: HOSPADM

## 2024-06-19 RX ORDER — ENOXAPARIN SODIUM 100 MG/ML
40 INJECTION SUBCUTANEOUS DAILY
Status: DISCONTINUED | OUTPATIENT
Start: 2024-06-19 | End: 2024-06-23 | Stop reason: HOSPADM

## 2024-06-19 RX ORDER — ACETAMINOPHEN 325 MG/1
650 TABLET ORAL EVERY 6 HOURS PRN
Status: DISCONTINUED | OUTPATIENT
Start: 2024-06-19 | End: 2024-06-23 | Stop reason: HOSPADM

## 2024-06-19 RX ADMIN — SODIUM CHLORIDE 150 ML/HR: 0.9 INJECTION, SOLUTION INTRAVENOUS at 21:20

## 2024-06-19 RX ADMIN — ENOXAPARIN SODIUM 40 MG: 40 INJECTION SUBCUTANEOUS at 15:43

## 2024-06-19 RX ADMIN — SODIUM CHLORIDE 1000 ML: 0.9 INJECTION, SOLUTION INTRAVENOUS at 09:59

## 2024-06-19 RX ADMIN — SODIUM CHLORIDE 150 ML/HR: 0.9 INJECTION, SOLUTION INTRAVENOUS at 12:41

## 2024-06-19 RX ADMIN — IOHEXOL 100 ML: 350 INJECTION, SOLUTION INTRAVENOUS at 10:46

## 2024-06-19 NOTE — ASSESSMENT & PLAN NOTE
POA, with history of microscopic colitis diagnosed recently with 3-5 days of cramping, diarrhea, and poor oral intake.   CT scan with pancolitis and CRP elevated   No fevers   Admit patient to med/surg under inpatient status   GI consult appreciated   Stool studies   Giardia  Calprotectin   IVF  Supportive care  Antibiotics vs steroids pending cultures

## 2024-06-19 NOTE — PLAN OF CARE
Problem: PAIN - ADULT  Goal: Verbalizes/displays adequate comfort level or baseline comfort level  Description: Interventions:  - Encourage patient to monitor pain and request assistance  - Assess pain using appropriate pain scale  - Administer analgesics based on type and severity of pain and evaluate response  - Implement non-pharmacological measures as appropriate and evaluate response  - Consider cultural and social influences on pain and pain management  - Notify physician/advanced practitioner if interventions unsuccessful or patient reports new pain  Outcome: Progressing     Problem: INFECTION - ADULT  Goal: Absence or prevention of progression during hospitalization  Description: INTERVENTIONS:  - Assess and monitor for signs and symptoms of infection  - Monitor lab/diagnostic results  - Monitor all insertion sites, i.e. indwelling lines, tubes, and drains  - Monitor endotracheal if appropriate and nasal secretions for changes in amount and color  - [place] appropriate cooling/warming therapies per order  - Administer medications as ordered  - Instruct and encourage patient and family to use good hand hygiene technique  - Identify and instruct in appropriate isolation precautions for identified infection/condition  Outcome: Progressing  Goal: Absence of fever/infection during neutropenic period  Description: INTERVENTIONS:  - Monitor WBC    Outcome: Progressing     Problem: SAFETY ADULT  Goal: Patient will remain free of falls  Description: INTERVENTIONS:  - Educate patient/family on patient safety including physical limitations  - Instruct patient to call for assistance with activity   - Consult OT/PT to assist with strengthening/mobility   - Keep Call bell within reach  - Keep bed low and locked with side rails adjusted as appropriate  - Keep care items and personal belongings within reach  - Initiate and maintain comfort rounds  - Make Fall Risk Sign visible to staff  - Offer Toileting every 2 Hours,  in advance of need  - Initiate/Maintain bed and chair alarm  - Obtain necessary fall risk management equipment:   - Apply yellow socks and bracelet for high fall risk patients  - Consider moving patient to room near nurses station  Outcome: Progressing  Goal: Maintain or return to baseline ADL function  Description: INTERVENTIONS:  -  Assess patient's ability to carry out ADLs; assess patient's baseline for ADL function and identify physical deficits which impact ability to perform ADLs (bathing, care of mouth/teeth, toileting, grooming, dressing, etc.)  - Assess/evaluate cause of self-care deficits   - Assess range of motion  - Assess patient's mobility; develop plan if impaired  - Assess patient's need for assistive devices and provide as appropriate  - Encourage maximum independence but intervene and supervise when necessary  - Involve family in performance of ADLs  - Assess for home care needs following discharge   - Consider OT consult to assist with ADL evaluation and planning for discharge  - Provide patient education as appropriate  Outcome: Progressing  Goal: Maintains/Returns to pre admission functional level  Description: INTERVENTIONS:  - Perform AM-PAC 6 Click Basic Mobility/ Daily Activity assessment daily.  - Set and communicate daily mobility goal to care team and patient/family/caregiver.   - Collaborate with rehabilitation services on mobility goals if consulted  - Perform Range of Motion 3 times a day.  - Reposition patient every 2 hours.  - Dangle patient 3 times a day  - Stand patient 3 times a day  - Ambulate patient 3 times a day  - Out of bed to chair 3 times a day   - Out of bed for meals 3 times a day  - Out of bed for toileting  - Record patient progress and toleration of activity level   Outcome: Progressing     Problem: Knowledge Deficit  Goal: Patient/family/caregiver demonstrates understanding of disease process, treatment plan, medications, and discharge instructions  Description:  Complete learning assessment and assess knowledge base.  Interventions:  - Provide teaching at level of understanding  - Provide teaching via preferred learning methods  Outcome: Progressing

## 2024-06-19 NOTE — ED PROVIDER NOTES
History  Chief Complaint   Patient presents with    Nausea     N/V/D [id number]     Patient is a 54-year-old female with history of lymphocytic colitis who presents to the emergency department for evaluation with worsening diarrhea as well as new lead today-nausea and vomiting.  In late May she saw her gastroenterologist  and explained that diarrhea had been increased over the last several months.  She underwent colonoscopy on May 30 and results reveals microcytic colitis.  On June 5 she was initiated on mesalamine.  Stools have been occurring approximately 5-7 times a day-loose/explosive.  She notes that they specifically seem to worsen on Friday or Saturday (4 to 5 days ago).  She relates that she is rushing to the restroom every 5 to 10 minutes.  She often appreciates some spasming in the lower abdomen afterwards.  Today she additionally preach needs chills.  She was exceptionally fatigued yesterday and rested in bed for a few hours which is very atypical for her.  She additionally notes generalized headache.  Today she had vomiting of clear fluid.  She has been eating and drinking minimally since the weekend.  Reports no concerns with urination-still voiding adequate quantity and without dysuria or appreciated hematuria.    Medical history otherwise significant for Hashimoto's thyroiditis.  She denies having had any recent travel, antibiotic use or known sick contacts or bad food ingestions.  She does believe she is dehydrated feeling increasingly lightheaded after each defecation.        Prior to Admission Medications   Prescriptions Last Dose Informant Patient Reported? Taking?   Cyanocobalamin (VITAMIN B 12 PO)  Self Yes No   Sig: Take by mouth   escitalopram (LEXAPRO) 20 mg tablet  Self No No   Sig: Take 1 tablet (20 mg total) by mouth daily for 10 days      Facility-Administered Medications: None       Past Medical History:   Diagnosis Date    Anxiety     Asthma     no current issues    Colitis     Colon  polyp     Dysfunctional uterine bleeding     H/O total vaginal hysterectomy 2021    S/P left oophorectomy 2021       Past Surgical History:   Procedure Laterality Date    DILATION AND CURETTAGE OF UTERUS      DILATION AND CURETTAGE OF UTERUS N/A 2017    Procedure: DILATATION AND CURETTAGE (D&C);  Surgeon: Alexy Gamez MD;  [facility];  Service:     ENDOMETRIAL ABLATION N/A 2017    Procedure: ABLATION ENDOMETRIAL NOVASURE;  Surgeon: Alexy Gamez MD;  [facility];  Service:     HYSTERECTOMY  2021    INDUCED       By Dilation and Evacuation    OOPHORECTOMY Left 2021    Procedure: OOPHORECTOMY, LAPAROSCOPIC;  Surgeon: Kandy Marquez MD;  [facility];  Service: Gynecology    RI CYSTOURETHROSCOPY N/A 2021    Procedure: CYSTOSCOPY;  Surgeon: Kandy Marquez MD;  [facility];  Service: Gynecology    RI LAPS ABD PRTM&OMENTUM DX W/WO SPEC BR/WA SPX N/A 2021    Procedure: LAPAROSCOPY DIAGNOSTIC;  Surgeon: Kandy Marquez MD;  [facility];  Service: Gynecology    RI VAG HYST 250 GM/< W/RMVL TUBE&/OVARY N/A 2021    Procedure: HYSTERECTOMY VAGINAL TOTAL (TVH) WITH BILATERAL SALPINGECTOMY;  Surgeon: Kandy Marquez MD;  [facility];  Service: Gynecology       Family History   Problem Relation Age of Onset    Hypothyroidism Mother     Lung cancer Mother 65    Cancer Mother     Heart disease Father     Venous thrombosis Father         of Deep Vessels of Lower Extremity    Aortic aneurysm Father     Breast cancer Paternal Grandmother 60    Hypertension Paternal Grandfather     Breast cancer Maternal Aunt 50    No Known Problems Maternal Aunt     No Known Problems Maternal Aunt     Breast cancer Paternal Aunt 50    Breast cancer Paternal Aunt 50    No Known Problems Paternal Aunt     No Known Problems Paternal Aunt     Esophageal cancer Paternal Uncle 63    Diabetes Family     Cancer Family      I have reviewed  and agree with the history as documented.    E-Cigarette/Vaping    E-Cigarette Use Never User      E-Cigarette/Vaping Substances    Nicotine No     THC No     CBD No     Flavoring No     Other No     Unknown No      Social History     Tobacco Use    Smoking status: Former     Current packs/day: 0.00     Average packs/day: 1 pack/day for 15.2 years (15.2 ttl pk-yrs)     Types: Cigarettes     Start date: 1986     Quit date: 2001     Years since quittin.8     Passive exposure: Never    Smokeless tobacco: Never   Vaping Use    Vaping status: Never Used   Substance Use Topics    Alcohol use: Yes     Alcohol/week: 2.0 standard drinks of alcohol     Comment: socially    Drug use: Never       Review of Systems   All other systems reviewed and are negative.      Physical Exam  Physical Exam  Vitals and nursing note reviewed.   Constitutional:       Appearance: Normal appearance.   HENT:      Head: Normocephalic.      Mouth/Throat:      Mouth: Mucous membranes are moist.   Eyes:      Extraocular Movements: Extraocular movements intact.      Conjunctiva/sclera: Conjunctivae normal.   Cardiovascular:      Rate and Rhythm: Normal rate and regular rhythm.      Heart sounds: Normal heart sounds.   Pulmonary:      Effort: Pulmonary effort is normal.      Breath sounds: Normal breath sounds.   Abdominal:      General: There is no distension.      Palpations: Abdomen is soft.      Tenderness: There is abdominal tenderness (Diffuse-maximal left lower quadrant). There is right CVA tenderness and left CVA tenderness.      Comments: Hyperactive bowel sounds.  Mild diffuse lumbar tenderness bilaterally   Musculoskeletal:         General: Normal range of motion.   Skin:     General: Skin is warm and dry.      Coloration: Skin is pale.   Neurological:      General: No focal deficit present.      Mental Status: She is alert and oriented to person, place, and time.   Psychiatric:         Mood and Affect: Mood normal.          Behavior: Behavior normal.         Vital Signs  ED Triage Vitals [06/19/24 0926]   Temperature Pulse Respirations Blood Pressure SpO2   97.6 °F (36.4 °C) 87 16 121/88 94 %      Temp Source Heart Rate Source Patient Position - Orthostatic VS BP Location FiO2 (%)   Oral Monitor Lying Left arm --      Pain Score       6           Vitals:    06/19/24 0926 06/19/24 1500   BP: 121/88 134/81   Pulse: 87 74   Patient Position - Orthostatic VS: Lying Lying         Visual Acuity      ED Medications  Medications   sodium chloride 0.9 % infusion (150 mL/hr Intravenous New Bag 6/19/24 1241)   acetaminophen (TYLENOL) tablet 650 mg (has no administration in time range)   ondansetron (ZOFRAN) injection 4 mg (has no administration in time range)   enoxaparin (LOVENOX) subcutaneous injection 40 mg (40 mg Subcutaneous Given 6/19/24 1543)   sodium chloride 0.9 % bolus 1,000 mL (0 mL Intravenous Stopped 6/19/24 1059)   iohexol (OMNIPAQUE) 350 MG/ML injection (MULTI-DOSE) 100 mL (100 mL Intravenous Given 6/19/24 1046)       Diagnostic Studies  Results Reviewed       Procedure Component Value Units Date/Time    Urine Microscopic [[id number]]  (Abnormal) Collected: 06/19/24 1336    Lab Status: Final result Specimen: Urine, Clean Catch Updated: 06/19/24 1558     RBC, UA 1-2 /hpf      WBC, UA None Seen /hpf      Epithelial Cells Occasional /hpf      Bacteria, UA None Seen /hpf      MUCUS THREADS Occasional    UA (URINE) with reflex to Scope [[id number]]  (Abnormal) Collected: 06/19/24 1336    Lab Status: Final result Specimen: Urine, Clean Catch Updated: 06/19/24 1400     Color, UA Light Yellow     Clarity, UA Clear     Specific Gravity, UA >=1.050     pH, UA 6.0     Leukocytes, UA Negative     Nitrite, UA Negative     Protein, UA 50 (1+) mg/dl      Glucose, UA Negative mg/dl      Ketones, UA 20 (1+) mg/dl      Urobilinogen, UA <2.0 mg/dl      Bilirubin, UA Negative     Occult Blood, UA Negative    Calprotectin,Fecal [[id number]] Collected:  06/19/24 1253    Lab Status: In process Specimen: Stool from Rectum Updated: 06/19/24 1302    Stool Enteric Bacterial Panel by PCR [[id number]] Collected: 06/19/24 1253    Lab Status: In process Specimen: Stool from Rectum Updated: 06/19/24 1302    Giardia antigen [[id number]] Collected: 06/19/24 1253    Lab Status: In process Specimen: Stool from Per Rectum Updated: 06/19/24 1300    C-reactive protein [[id number]]  (Abnormal) Collected: 06/19/24 0937    Lab Status: Final result Specimen: Blood from Arm, Left Updated: 06/19/24 1258     CRP 22.7 mg/L     TSH, 3rd generation with Free T4 reflex [[id number]]  (Normal) Collected: 06/19/24 0937    Lab Status: Final result Specimen: Blood from Arm, Left Updated: 06/19/24 1258     TSH 3RD GENERATON 2.794 uIU/mL     Clostridium difficile toxin by PCR with EIA [[id number]] Collected: 06/19/24 1252    Lab Status: No result Specimen: Stool from Rectum     HS Troponin 0hr (reflex protocol) [[id number]]  (Normal) Collected: 06/19/24 0957    Lab Status: Final result Specimen: Blood from Arm, Left Updated: 06/19/24 1038     hs TnI 0hr <2 ng/L     Lactic acid, plasma (w/reflex if result > 2.0) [[id number]]  (Normal) Collected: 06/19/24 0957    Lab Status: Final result Specimen: Blood from Arm, Left Updated: 06/19/24 1029     LACTIC ACID 1.8 mmol/L     Narrative:      Result may be elevated if tourniquet was used during collection.    Comprehensive metabolic panel [[id number]] Collected: 06/19/24 0937    Lab Status: Final result Specimen: Blood from Arm, Left Updated: 06/19/24 1007     Sodium 136 mmol/L      Potassium 3.5 mmol/L      Chloride 105 mmol/L      CO2 21 mmol/L      ANION GAP 10 mmol/L      BUN 13 mg/dL      Creatinine 0.94 mg/dL      Glucose 138 mg/dL      Calcium 10.0 mg/dL      AST 19 U/L      ALT 27 U/L      Alkaline Phosphatase 97 U/L      Total Protein 7.2 g/dL      Albumin 4.4 g/dL      Total Bilirubin 0.91 mg/dL      eGFR 69 ml/min/1.73sq m     Narrative:      National  Kidney Disease Foundation guidelines for Chronic Kidney Disease (CKD):     Stage 1 with normal or high GFR (GFR > 90 mL/min/1.73 square meters)    Stage 2 Mild CKD (GFR = 60-89 mL/min/1.73 square meters)    Stage 3A Moderate CKD (GFR = 45-59 mL/min/1.73 square meters)    Stage 3B Moderate CKD (GFR = 30-44 mL/min/1.73 square meters)    Stage 4 Severe CKD (GFR = 15-29 mL/min/1.73 square meters)    Stage 5 End Stage CKD (GFR <15 mL/min/1.73 square meters)  Note: GFR calculation is accurate only with a steady state creatinine    Lipase [[id number]]  (Normal) Collected: 06/19/24 0937    Lab Status: Final result Specimen: Blood from Arm, Left Updated: 06/19/24 1007     Lipase 12 u/L     CBC and differential [[id number]]  (Abnormal) Collected: 06/19/24 0937    Lab Status: Final result Specimen: Blood from Arm, Left Updated: 06/19/24 1001     WBC 6.71 Thousand/uL      RBC 5.52 Million/uL      Hemoglobin 16.2 g/dL      Hematocrit 47.3 %      MCV 86 fL      MCH 29.3 pg      MCHC 34.2 g/dL      RDW 12.6 %      MPV 10.7 fL      Platelets 350 Thousands/uL      nRBC 0 /100 WBCs      Segmented % 70 %      Immature Grans % 0 %      Lymphocytes % 15 %      Monocytes % 12 %      Eosinophils Relative 3 %      Basophils Relative 0 %      Absolute Neutrophils 4.64 Thousands/µL      Absolute Immature Grans 0.02 Thousand/uL      Absolute Lymphocytes 1.02 Thousands/µL      Absolute Monocytes 0.81 Thousand/µL      Eosinophils Absolute 0.20 Thousand/µL      Basophils Absolute 0.02 Thousands/µL                    CT abdomen pelvis with contrast   Final Result by Barber Wade MD (06/19 1132)      Mild uncomplicated infectious or inflammatory pancolitis. No free air or abscess.      Workstation performed: [id number]                    Procedures  Procedures         ED Course  ED Course as of 06/19/24 1636   Wed Jun 19, 2024   0953 Concern greatest for progression of lymphocytic colitis.  With symptoms of lightheadedness and increasingly  frequent stool loss do suspect dehydration present.  Must consider possibility of PATY/electrolyte abnormalities.  Additional considerations include diverticulitis, bacterial or ischemic colitis, pancreatitis, ACS.   1228 Blood work relatively reassuring in setting of symptoms.  However these are progressively worsening correspond to pancolitis on CT do feel that in-hospital treatment will be appropriate.  Patient in agreement.  She is feeling somewhat improved following IV fluid.  Touch base with on-call gastroenterology PA-Sharon Williamson.  Giardia testing will be added to stool studies.  Advised awaiting these prior to initiation additional therapy and at this time providing hydrating/supportive care.  This was reviewed with Dr. Kinney who accepted patient onto his service.                               SBIRT 22yo+      Flowsheet Row Most Recent Value   Initial Alcohol Screen: US AUDIT-C     1. How often do you have a drink containing alcohol? 0 Filed at: 06/19/2024 0924   2. How many drinks containing alcohol do you have on a typical day you are drinking?  0 Filed at: 06/19/2024 0924   3b. FEMALE Any Age, or MALE 65+: How often do you have 4 or more drinks on one occassion? 0 Filed at: 06/19/2024 0924   Audit-C Score 0 Filed at: 06/19/2024 0924   JUDE: How many times in the past year have you...    Used an illegal drug or used a prescription medication for non-medical reasons? Never Filed at: 06/19/2024 0924                      Medical Decision Making  Amount and/or Complexity of Data Reviewed  Labs: ordered.  Radiology: ordered.    Risk  Prescription drug management.  Decision regarding hospitalization.             Disposition  Final diagnoses:   Pancolitis (HCC)   Lymphocytic colitis - History of     Time reflects when diagnosis was documented in both MDM as applicable and the Disposition within this note       Time User Action Codes Description Comment    6/19/2024 12:20 PM Yelena Flores Add  [K51.00] Pancolitis (HCC)     6/19/2024 12:21 PM Yelena Flores Add [K52.832] Lymphocytic colitis     6/19/2024 12:21 PM Yelena Flores Modify [K52.832] Lymphocytic colitis History of          ED Disposition       ED Disposition   Admit    Condition   Stable    Date/Time   Wed Jun 19, 2024 1220    Comment   Case was discussed with Dr. Kinney and the patient's admission status was agreed to be Admission Status: inpatient status to the service of Dr. Kinney .               Follow-up Information    None         Current Discharge Medication List        CONTINUE these medications which have NOT CHANGED    Details   Cyanocobalamin (VITAMIN B 12 PO) Take by mouth      escitalopram (LEXAPRO) 20 mg tablet Take 1 tablet (20 mg total) by mouth daily for 10 days  Qty: 10 tablet, Refills: 0    Comments: This is to hold patient until mail order arrives  Associated Diagnoses: Major depressive disorder with single episode, in full remission (HCC)             No discharge procedures on file.    PDMP Review         Value Time User    PDMP Reviewed  Yes 12/6/2023 12:47 AM Carrillo Myers MD            ED Provider  Electronically Signed by             Yelena Flores MD  06/19/24 3811

## 2024-06-19 NOTE — H&P
[facility]  H&P  Name: Xenia Jones 54 y.o. female I MRN: [id number]  Unit/Bed#: ED-16 I Date of Admission: 6/19/2024   Date of Service: 6/19/2024 I Hospital Day: 0      Assessment & Plan   * Pancolitis (HCC)  Assessment & Plan  POA, with history of microscopic colitis diagnosed recently with 3-5 days of cramping, diarrhea, and poor oral intake.   CT scan with pancolitis and CRP elevated   No fevers   Admit patient to med/surg under inpatient status   GI consult appreciated   Stool studies   Giardia  Calprotectin   IVF  Supportive care  Antibiotics vs steroids pending cultures     Hashimoto's thyroiditis  Assessment & Plan  Reported by patient, not on treatment currently   Follow up outpatient            VTE Pharmacologic Prophylaxis: VTE Score: 3 Moderate Risk (Score 3-4) - Pharmacological DVT Prophylaxis Ordered: enoxaparin (Lovenox).  Code Status: Full Code   Discussion with family:  None at bedside .     Anticipated Length of Stay: Patient will be admitted on an inpatient basis with an anticipated length of stay of greater than 2 midnights secondary to as per above assessment and plan .    Total Time Spent on Date of Encounter in care of patient: 75 mins. This time was spent on one or more of the following: performing physical exam; counseling and coordination of care; obtaining or reviewing history; documenting in the medical record; reviewing/ordering tests, medications or procedures; communicating with other healthcare professionals and discussing with patient's family/caregivers.    Chief Complaint: Abdominal Pain, Diarrhea    History of Present Illness:  Xenia Jones is a 54 y.o. female with a PMH of microscopic colitis who presents with abdominal pain. She reports symptoms over the last 3-5 days. Reports generalized cramping abdominal pain and multiple watery episodes of diarrhea. She also reports vomiting and chills. Denies fevers. Denies bloody diarrhea. Denies sick  contacts. Reports that she has been on Lialda for about 3 weeks now as her diagnosis is relatively new. She reports having recent colonoscopy as well. She reports a recent diagnosis of Hashimotos, but she reports she is not on treatment at this time. She reports that she has not been able to eat and drink much and states she feels very dehydrated.     Review of Systems:  Review of Systems   Constitutional:  Positive for chills and unexpected weight change. Negative for appetite change, diaphoresis, fatigue and fever.   HENT:  Negative for congestion, rhinorrhea and sore throat.    Eyes:  Negative for visual disturbance.   Respiratory:  Negative for cough, chest tightness, shortness of breath and wheezing.    Cardiovascular:  Negative for chest pain, palpitations and leg swelling.   Gastrointestinal:  Positive for abdominal pain, diarrhea, nausea and vomiting. Negative for blood in stool and constipation.   Genitourinary:  Negative for dysuria.   Musculoskeletal:  Negative for arthralgias and myalgias.   Neurological:  Negative for dizziness, syncope, weakness, light-headedness, numbness and headaches.   All other systems reviewed and are negative.      Past Medical and Surgical History:   Past Medical History:   Diagnosis Date    Anxiety     Asthma     no current issues    Colitis     Colon polyp     Dysfunctional uterine bleeding     H/O total vaginal hysterectomy 2021    S/P left oophorectomy 2021       Past Surgical History:   Procedure Laterality Date    DILATION AND CURETTAGE OF UTERUS      DILATION AND CURETTAGE OF UTERUS N/A 2017    Procedure: DILATATION AND CURETTAGE (D&C);  Surgeon: Alexy Gamez MD;  [facility];  Service:     ENDOMETRIAL ABLATION N/A 2017    Procedure: ABLATION ENDOMETRIAL NOVASURE;  Surgeon: Alexy Gamez MD;  [facility];  Service:     HYSTERECTOMY  2021    INDUCED       By Dilation and Evacuation    OOPHORECTOMY Left 2021     Procedure: OOPHORECTOMY, LAPAROSCOPIC;  Surgeon: Kandy Marquez MD;  [facility];  Service: Gynecology    IN CYSTOURETHROSCOPY N/A 2021    Procedure: CYSTOSCOPY;  Surgeon: Kandy Marquze MD;  [facility];  Service: Gynecology    IN LAPS ABD PRTM&OMENTUM DX W/WO SPEC BR/WA SPX N/A 2021    Procedure: LAPAROSCOPY DIAGNOSTIC;  Surgeon: Kandy Marquez MD;  [facility];  Service: Gynecology    IN VAG HYST 250 GM/< W/RMVL TUBE&/OVARY N/A 2021    Procedure: HYSTERECTOMY VAGINAL TOTAL (TVH) WITH BILATERAL SALPINGECTOMY;  Surgeon: Kandy Marquez MD;  [facility];  Service: Gynecology       Meds/Allergies:  Prior to Admission medications    Medication Sig Start Date End Date Taking? Authorizing Provider   Cyanocobalamin (VITAMIN B 12 PO) Take by mouth    Historical Provider, MD   escitalopram (LEXAPRO) 20 mg tablet Take 1 tablet (20 mg total) by mouth daily for 10 days 10/24/23 5/30/24  Cornelia Farooq DO   mesalamine (LIALDA) 1.2 g EC tablet Take 4 tablets (4.8 g total) by mouth daily with breakfast 24  Xenia Herrera DO     I have reviewed home medications with patient personally.    Allergies: No Known Allergies    Social History:  Marital Status: /Civil Union   Occupation: Noncontributory   Patient Pre-hospital Living Situation: Home  Patient Pre-hospital Level of Mobility: walks  Patient Pre-hospital Diet Restrictions: None  Substance Use History:   Social History     Substance and Sexual Activity   Alcohol Use Yes    Alcohol/week: 2.0 standard drinks of alcohol    Comment: socially     Social History     Tobacco Use   Smoking Status Former    Current packs/day: 0.00    Average packs/day: 1 pack/day for 15.2 years (15.2 ttl pk-yrs)    Types: Cigarettes    Start date: 1986    Quit date: 2001    Years since quittin.8    Passive exposure: Never   Smokeless Tobacco Never     Social History     Substance and  Sexual Activity   Drug Use Never       Family History:  Family History   Problem Relation Age of Onset    Hypothyroidism Mother     Lung cancer Mother 65    Cancer Mother     Heart disease Father     Venous thrombosis Father         of Deep Vessels of Lower Extremity    Aortic aneurysm Father     Breast cancer Paternal Grandmother 60    Hypertension Paternal Grandfather     Breast cancer Maternal Aunt 50    No Known Problems Maternal Aunt     No Known Problems Maternal Aunt     Breast cancer Paternal Aunt 50    Breast cancer Paternal Aunt 50    No Known Problems Paternal Aunt     No Known Problems Paternal Aunt     Esophageal cancer Paternal Uncle 63    Diabetes Family     Cancer Family        Physical Exam:     Vitals:   Blood Pressure: 121/88 (06/19/24 0926)  Pulse: 87 (06/19/24 0926)  Temperature: 97.6 °F (36.4 °C) (06/19/24 0926)  Temp Source: Oral (06/19/24 0926)  Respirations: 16 (06/19/24 0926)  Weight - Scale: 112 kg (246 lb 4.1 oz) (06/19/24 0926)  SpO2: 94 % (06/19/24 0926)    Physical Exam  Constitutional:       General: She is not in acute distress.     Appearance: Normal appearance. She is overweight. She is not ill-appearing or diaphoretic.   HENT:      Head: Normocephalic and atraumatic.      Mouth/Throat:      Mouth: Mucous membranes are dry.   Eyes:      General: No scleral icterus.     Pupils: Pupils are equal, round, and reactive to light.   Cardiovascular:      Rate and Rhythm: Normal rate and regular rhythm.      Pulses: Normal pulses.      Heart sounds: Normal heart sounds, S1 normal and S2 normal. No murmur heard.     No systolic murmur is present.      No diastolic murmur is present.      No gallop. No S3 or S4 sounds.   Pulmonary:      Effort: Pulmonary effort is normal. No accessory muscle usage or respiratory distress.      Breath sounds: Normal breath sounds. No stridor. No decreased breath sounds, wheezing, rhonchi or rales.   Chest:      Chest wall: No tenderness.   Abdominal:       General: Bowel sounds are normal. There is no distension.      Palpations: Abdomen is soft.      Tenderness: There is generalized abdominal tenderness. There is no guarding.   Musculoskeletal:      Right lower leg: No edema.      Left lower leg: No edema.   Skin:     General: Skin is warm and dry.      Coloration: Skin is not jaundiced.   Neurological:      General: No focal deficit present.      Mental Status: She is alert. Mental status is at baseline.      Motor: No tremor or seizure activity.   Psychiatric:         Behavior: Behavior is cooperative.          Additional Data:     Lab Results:  Results from last 7 days   Lab Units 06/19/24  0937   WBC Thousand/uL 6.71   HEMOGLOBIN g/dL 16.2*   HEMATOCRIT % 47.3*   PLATELETS Thousands/uL 350   SEGS PCT % 70   LYMPHO PCT % 15   MONO PCT % 12   EOS PCT % 3     Results from last 7 days   Lab Units 06/19/24  0937   SODIUM mmol/L 136   POTASSIUM mmol/L 3.5   CHLORIDE mmol/L 105   CO2 mmol/L 21   BUN mg/dL 13   CREATININE mg/dL 0.94   ANION GAP mmol/L 10   CALCIUM mg/dL 10.0   ALBUMIN g/dL 4.4   TOTAL BILIRUBIN mg/dL 0.91   ALK PHOS U/L 97   ALT U/L 27   AST U/L 19   GLUCOSE RANDOM mg/dL 138             Lab Results   Component Value Date    HGBA1C 5.3 03/29/2024    HGBA1C 5.7 (H) 12/26/2023    HGBA1C 5.2 07/26/2023     Results from last 7 days   Lab Units 06/19/24  0957   LACTIC ACID mmol/L 1.8       Lines/Drains:  Invasive Devices       Peripheral Intravenous Line  Duration             Peripheral IV 06/19/24 Left Antecubital <1 day                        Imaging: Reviewed radiology reports from this admission including: abdominal/pelvic CT  CT abdomen pelvis with contrast   Final Result by Barber Wade MD (06/19 1132)      Mild uncomplicated infectious or inflammatory pancolitis. No free air or abscess.      Workstation performed: [id number]             EKG and Other Studies Reviewed on Admission:   EKG: No EKG obtained.  CT abdomen pelvis: Mild, uncomplicated  infectious or inflammatory pancolitis. No free air or abscess.     ** Please Note: This note has been constructed using a voice recognition system. **

## 2024-06-19 NOTE — LETTER
[facility]  FLOOR MED SURG UNIT  [address]  [place] [id number]  Dept: [phone]    June 23, 2024     Patient: Xneia Jones   YOB: 1970   Date of Visit: 6/19/2024       To Whom it May Concern:    Xenia Jones is under my professional care. She was seen in the hospital from 6/19/2024 to 06/23/24. She may return to work on 06/24/2024 but requires to work remotely from home due to medical condition. Patient may return in person to work on 07/08/2024.    If you have any questions or concerns, please don't hesitate to call.         Sincerely,          Sherry Bunn PA-C

## 2024-06-20 ENCOUNTER — NURSE TRIAGE (OUTPATIENT)
Age: 54
End: 2024-06-20

## 2024-06-20 LAB
ANION GAP SERPL CALCULATED.3IONS-SCNC: 5 MMOL/L (ref 4–13)
ATRIAL RATE: 82 BPM
BUN SERPL-MCNC: 11 MG/DL (ref 5–25)
C COLI+JEJUNI TUF STL QL NAA+PROBE: NEGATIVE
CALCIUM SERPL-MCNC: 8.8 MG/DL (ref 8.4–10.2)
CHLORIDE SERPL-SCNC: 111 MMOL/L (ref 96–108)
CO2 SERPL-SCNC: 24 MMOL/L (ref 21–32)
CREAT SERPL-MCNC: 0.76 MG/DL (ref 0.6–1.3)
EC STX1+STX2 GENES STL QL NAA+PROBE: NEGATIVE
ERYTHROCYTE [DISTWIDTH] IN BLOOD BY AUTOMATED COUNT: 12.5 % (ref 11.6–15.1)
G LAMBLIA AG STL QL IA: NEGATIVE
GFR SERPL CREATININE-BSD FRML MDRD: 89 ML/MIN/1.73SQ M
GLUCOSE SERPL-MCNC: 89 MG/DL (ref 65–140)
HCT VFR BLD AUTO: 39.1 % (ref 34.8–46.1)
HGB BLD-MCNC: 13.3 G/DL (ref 11.5–15.4)
MCH RBC QN AUTO: 29.1 PG (ref 26.8–34.3)
MCHC RBC AUTO-ENTMCNC: 33.2 G/DL (ref 31.4–37.4)
MCV RBC AUTO: 88 FL (ref 82–98)
P AXIS: 70 DEGREES
PLATELET # BLD AUTO: 257 THOUSANDS/UL (ref 149–390)
PMV BLD AUTO: 10.4 FL (ref 8.9–12.7)
POTASSIUM SERPL-SCNC: 3.6 MMOL/L (ref 3.5–5.3)
PR INTERVAL: 156 MS
QRS AXIS: 89 DEGREES
QRSD INTERVAL: 84 MS
QT INTERVAL: 380 MS
QTC INTERVAL: 443 MS
RBC # BLD AUTO: 4.46 MILLION/UL (ref 3.81–5.12)
SALMONELLA SP SPAO STL QL NAA+PROBE: NEGATIVE
SHIGELLA SP+EIEC IPAH STL QL NAA+PROBE: NEGATIVE
SODIUM SERPL-SCNC: 140 MMOL/L (ref 135–147)
T WAVE AXIS: 75 DEGREES
VENTRICULAR RATE: 82 BPM
WBC # BLD AUTO: 3.63 THOUSAND/UL (ref 4.31–10.16)

## 2024-06-20 PROCEDURE — 87493 C DIFF AMPLIFIED PROBE: CPT | Performed by: EMERGENCY MEDICINE

## 2024-06-20 PROCEDURE — 93010 ELECTROCARDIOGRAM REPORT: CPT | Performed by: INTERNAL MEDICINE

## 2024-06-20 PROCEDURE — 99222 1ST HOSP IP/OBS MODERATE 55: CPT | Performed by: INTERNAL MEDICINE

## 2024-06-20 PROCEDURE — 80048 BASIC METABOLIC PNL TOTAL CA: CPT | Performed by: PHYSICIAN ASSISTANT

## 2024-06-20 PROCEDURE — 99232 SBSQ HOSP IP/OBS MODERATE 35: CPT

## 2024-06-20 PROCEDURE — 85027 COMPLETE CBC AUTOMATED: CPT | Performed by: PHYSICIAN ASSISTANT

## 2024-06-20 RX ORDER — DICYCLOMINE HYDROCHLORIDE 10 MG/1
10 CAPSULE ORAL
Status: DISCONTINUED | OUTPATIENT
Start: 2024-06-20 | End: 2024-06-21

## 2024-06-20 RX ORDER — MESALAMINE 400 MG/1
800 CAPSULE, DELAYED RELEASE ORAL 3 TIMES DAILY
Status: DISCONTINUED | OUTPATIENT
Start: 2024-06-20 | End: 2024-06-21

## 2024-06-20 RX ORDER — CHOLESTYRAMINE LIGHT 4 G/5.7G
4 POWDER, FOR SUSPENSION ORAL DAILY
Status: DISCONTINUED | OUTPATIENT
Start: 2024-06-20 | End: 2024-06-20

## 2024-06-20 RX ORDER — SODIUM CHLORIDE, SODIUM GLUCONATE, SODIUM ACETATE, POTASSIUM CHLORIDE, MAGNESIUM CHLORIDE, SODIUM PHOSPHATE, DIBASIC, AND POTASSIUM PHOSPHATE .53; .5; .37; .037; .03; .012; .00082 G/100ML; G/100ML; G/100ML; G/100ML; G/100ML; G/100ML; G/100ML
125 INJECTION, SOLUTION INTRAVENOUS CONTINUOUS
Status: DISCONTINUED | OUTPATIENT
Start: 2024-06-20 | End: 2024-06-23

## 2024-06-20 RX ADMIN — ACETAMINOPHEN 650 MG: 325 TABLET, FILM COATED ORAL at 08:48

## 2024-06-20 RX ADMIN — SODIUM CHLORIDE, SODIUM GLUCONATE, SODIUM ACETATE, POTASSIUM CHLORIDE, MAGNESIUM CHLORIDE, SODIUM PHOSPHATE, DIBASIC, AND POTASSIUM PHOSPHATE 125 ML/HR: .53; .5; .37; .037; .03; .012; .00082 INJECTION, SOLUTION INTRAVENOUS at 08:54

## 2024-06-20 RX ADMIN — MESALAMINE 800 MG: 400 CAPSULE, DELAYED RELEASE ORAL at 20:54

## 2024-06-20 RX ADMIN — MESALAMINE 800 MG: 400 CAPSULE, DELAYED RELEASE ORAL at 12:18

## 2024-06-20 RX ADMIN — DICYCLOMINE HYDROCHLORIDE 10 MG: 10 CAPSULE ORAL at 12:18

## 2024-06-20 RX ADMIN — MESALAMINE 800 MG: 400 CAPSULE, DELAYED RELEASE ORAL at 16:39

## 2024-06-20 RX ADMIN — SODIUM CHLORIDE, SODIUM GLUCONATE, SODIUM ACETATE, POTASSIUM CHLORIDE, MAGNESIUM CHLORIDE, SODIUM PHOSPHATE, DIBASIC, AND POTASSIUM PHOSPHATE 125 ML/HR: .53; .5; .37; .037; .03; .012; .00082 INJECTION, SOLUTION INTRAVENOUS at 17:01

## 2024-06-20 RX ADMIN — DICYCLOMINE HYDROCHLORIDE 10 MG: 10 CAPSULE ORAL at 16:39

## 2024-06-20 RX ADMIN — ENOXAPARIN SODIUM 40 MG: 40 INJECTION SUBCUTANEOUS at 08:49

## 2024-06-20 RX ADMIN — ACETAMINOPHEN 650 MG: 325 TABLET, FILM COATED ORAL at 20:54

## 2024-06-20 NOTE — CONSULTS
Consultation -  [facility]  Xenia ANNIA Barrientost 54 y.o. female MRN: [id number]  Unit/Bed#: S -01 Encounter: [id number]        Inpatient consult to gastroenterology  Consult performed by: DACIA Edwards  Consult ordered by: Augie Allan PA-C          Reason for Consult / Principal Problem:     Pancolitis/lymphocytic colitis      ASSESSMENT AND PLAN:      This is a 53 y/o F with hx of lymphocytic colitis first diagnosed in 2020 s/p course of Budesonide with good effect. She was doing well until she developed recurrent diarrheal symptoms 8 months ago prompting recent outpatient GI workup including stool studies & Colonoscopy 5/30/24 which showed normal TI & colonic mucosa but random colon biopsy was again consistent with lymphocytic colitis and she was placed on Lialda 4.8g/daily. She now presents with acute worsening of symptoms Saturday with diarrhea every 1/2 hour-hour w/ new onset abdominal cramping, chills, & nausea prompting presentation to the ED.       #1 Acute on chronic diarrhea  #2 Pancolitis on CT  #3 Lymphocytic colitis  CT abdomen/pelvis on admission notable for infectious or inflammatory pancolitis. She remains afebrile w/o leukocytosis, CRP elevated 22.7 from 4.6 in 2020. Symptoms are currently improving w/ supportive care, suspect possible infectious process with underlying lymphocytic colitis. She had only 2 liquid BM so far this morning & abdominal cramping is improving, denies any pain on exam. N/V resolved & is tolerating clear liquid diet.    -Follow up stool studies    -Advance to low residue, lactose restricted diet as tolerated    -Continue Mesalamine, switched to Delzicol as Lialda is not available in the hospital    -Start bentyl 10 mg TID prior to meals. Consider Questran pending course    -Consider Budesonide if stool studies negative & symptoms fail to improve    -Continue supportive care with IV hydration, repletion of electrolytes as  needed    Thank you for the consultation. Case discussed with Dr. Wesley  ______________________________________________________________________    HPI:  Xenia Jones is a 54 y.o. female with history of lymphocytic colitis and colon polyps who presents with worsening diarrhea and new onset nausea/vomiting.     She was seen recently in the GI office at the end of May for worsening diarrhea x 8 months. Stool studies were performed including fecal calprotectin, C. difficile PCR, Cryptosporidium, Giardia, O&P, enteric stool panel, fecal elastase which all came back negative and she had a colonoscopy performed on 5/30/2024 with Dr. Herrera showing normal colonic mucosa and TI and random colon biopsy was consistent with lymphocytic colitis.  She was prescribed mesalamine 4.8 g daily for treatment.  She messaged the GI office yesterday with worsening diarrhea with episodes every hour to every 1/2 hour with new lower abdominal cramping and nausea and recommended to present to the ER for further evaluation.    In the ED, she was afebrile with no leukocytosis.  Hemoglobin/Hct elevated but renal function, electrolytes, and lactic were normal. CRP elevated from prior at 22.7 from 4.6 back in 2020 and she was noted to have mild uncomplicated infectious or inflammatory pancolitis on CT. She was started on IVF and stool studies obtained.    She was first diagnosed with lymphocytic colitis in 2020 and treated with a course of budesonide at that time and reports symptoms had resolved following this.  Workup for Celiac disease with blood work and duodenal bx were negative at that time. She denies any NSAID use, smoking. No recent antibiotics, no sick contacts, recent travel,or new meds.    Symptoms are already improving, only 2 BM so far this AM still liquid, abdominal cramping improving. N/V resolved and she's tolerating liquid diet.             REVIEW OF SYSTEMS:    CONSTITUTIONAL: Denies any fever, chills, rigors, and weight  loss.  HEENT: No earache or tinnitus. Denies hearing loss or visual disturbances.  CARDIOVASCULAR: No chest pain or palpitations.   RESPIRATORY: Denies any cough, hemoptysis, shortness of breath or dyspnea on exertion.  GASTROINTESTINAL: As noted in the History of Present Illness.   GENITOURINARY: No problems with urination. Denies any hematuria or dysuria.  NEUROLOGIC: No dizziness or vertigo, denies headaches.   MUSCULOSKELETAL: Denies any muscle or joint pain.   SKIN: Denies skin rashes or itching.   ENDOCRINE: Denies excessive thirst. Denies intolerance to heat or cold.  PSYCHOSOCIAL: Denies depression or anxiety. Denies any recent memory loss.       Historical Information   Past Medical History:   Diagnosis Date    Anxiety     Asthma     no current issues    Colitis     Colon polyp     Dysfunctional uterine bleeding     H/O total vaginal hysterectomy 2021    S/P left oophorectomy 2021     Past Surgical History:   Procedure Laterality Date    DILATION AND CURETTAGE OF UTERUS      DILATION AND CURETTAGE OF UTERUS N/A 2017    Procedure: DILATATION AND CURETTAGE (D&C);  Surgeon: Alexy Gamez MD;  [facility];  Service:     ENDOMETRIAL ABLATION N/A 2017    Procedure: ABLATION ENDOMETRIAL NOVASURE;  Surgeon: Alexy Gamez MD;  [facility];  Service:     HYSTERECTOMY  2021    INDUCED       By Dilation and Evacuation    OOPHORECTOMY Left 2021    Procedure: OOPHORECTOMY, LAPAROSCOPIC;  Surgeon: Kandy Marquez MD;  [facility];  Service: Gynecology    [place] CYSTOURETHROSCOPY N/A 2021    Procedure: CYSTOSCOPY;  Surgeon: Kandy Marquez MD;  [facility];  Service: Gynecology    [place] LAPS ABD PRTM&OMENTUM DX W/WO SPEC BR/WA SPX N/A 2021    Procedure: LAPAROSCOPY DIAGNOSTIC;  Surgeon: Kandy Marquez MD;  [facility];  Service: Gynecology    [place] VAG HYST 250 GM/< W/RMVL TUBE&/OVARY N/A 2021    Procedure: HYSTERECTOMY  VAGINAL TOTAL (TVH) WITH BILATERAL SALPINGECTOMY;  Surgeon: Kandy Marquez MD;  [facility];  Service: Gynecology     Social History   Social History     Substance and Sexual Activity   Alcohol Use Yes    Alcohol/week: 2.0 standard drinks of alcohol    Comment: socially     Social History     Substance and Sexual Activity   Drug Use Never     Social History     Tobacco Use   Smoking Status Former    Current packs/day: 0.00    Average packs/day: 1 pack/day for 15.2 years (15.2 ttl pk-yrs)    Types: Cigarettes    Start date: 1986    Quit date: 2001    Years since quittin.9    Passive exposure: Never   Smokeless Tobacco Never     Family History   Problem Relation Age of Onset    Hypothyroidism Mother     Lung cancer Mother 65    Cancer Mother     Heart disease Father     Venous thrombosis Father         of Deep Vessels of Lower Extremity    Aortic aneurysm Father     Breast cancer Paternal Grandmother 60    Hypertension Paternal Grandfather     Breast cancer Maternal Aunt 50    No Known Problems Maternal Aunt     No Known Problems Maternal Aunt     Breast cancer Paternal Aunt 50    Breast cancer Paternal Aunt 50    No Known Problems Paternal Aunt     No Known Problems Paternal Aunt     Esophageal cancer Paternal Uncle 63    Diabetes Family     Cancer Family        Meds/Allergies       Medications Prior to Admission:     Cyanocobalamin (VITAMIN B 12 PO)    escitalopram (LEXAPRO) 20 mg tablet  Current Facility-Administered Medications   Medication Dose Route Frequency    acetaminophen (TYLENOL) tablet 650 mg  650 mg Oral Q6H PRN    enoxaparin (LOVENOX) subcutaneous injection 40 mg  40 mg Subcutaneous Daily    multi-electrolyte (PLASMALYTE-A/ISOLYTE-S PH 7.4) IV solution  125 mL/hr Intravenous Continuous    ondansetron (ZOFRAN) injection 4 mg  4 mg Intravenous Q6H PRN       No Known Allergies        Objective     Blood pressure 129/81, pulse 61, temperature (!) 97.3 °F (36.3 °C), resp. rate 16,  "height 5' 7\" (1.702 m), weight 110 kg (241 lb 10 oz), last menstrual period 12/27/2020, SpO2 96%, not currently breastfeeding. Body mass index is 37.84 kg/m².      Intake/Output Summary (Last 24 hours) at 6/20/2024 0826  Last data filed at 6/19/2024 2201  Gross per 24 hour   Intake 480 ml   Output --   Net 480 ml         PHYSICAL EXAM:      General Appearance:   Alert, cooperative, no distress   HEENT:   Normocephalic, atraumatic, anicteric.     Neck:  Supple, symmetrical, trachea midline   Lungs:   Clear to auscultation bilaterally; no rales, rhonchi or wheezing; respirations unlabored    Heart::   Regular rate and rhythm; no murmur, rub, or gallop.   Abdomen:   Soft, non-tender, non-distended; normal bowel sounds; no masses, no organomegaly    Genitalia:   Deferred    Rectal:   Deferred    Extremities:  No cyanosis, clubbing or edema    Pulses:  2+ and symmetric all extremities    Skin:  No jaundice, rashes, or lesions    Lymph nodes:  No palpable cervical lymphadenopathy        Lab Results:   Admission on 06/19/2024   Component Date Value    Ventricular Rate 06/19/2024 82     Atrial Rate 06/19/2024 82     MD Interval 06/19/2024 156     QRSD Interval 06/19/2024 84     QT Interval 06/19/2024 380     QTC Interval 06/19/2024 443     P Axis 06/19/2024 70     QRS Axis 06/19/2024 89     T Wave Axis 06/19/2024 75     WBC 06/19/2024 6.71     RBC 06/19/2024 5.52 (H)     Hemoglobin 06/19/2024 16.2 (H)     Hematocrit 06/19/2024 47.3 (H)     MCV 06/19/2024 86     MCH 06/19/2024 29.3     MCHC 06/19/2024 34.2     RDW 06/19/2024 12.6     MPV 06/19/2024 10.7     Platelets 06/19/2024 350     nRBC 06/19/2024 0     Segmented % 06/19/2024 70     Immature Grans % 06/19/2024 0     Lymphocytes % 06/19/2024 15     Monocytes % 06/19/2024 12     Eosinophils Relative 06/19/2024 3     Basophils Relative 06/19/2024 0     Absolute Neutrophils 06/19/2024 4.64     Absolute Immature Grans 06/19/2024 0.02     Absolute Lymphocytes 06/19/2024 1.02  "    Absolute Monocytes 06/19/2024 0.81     Eosinophils Absolute 06/19/2024 0.20     Basophils Absolute 06/19/2024 0.02     Sodium 06/19/2024 136     Potassium 06/19/2024 3.5     Chloride 06/19/2024 105     CO2 06/19/2024 21     ANION GAP 06/19/2024 10     BUN 06/19/2024 13     Creatinine 06/19/2024 0.94     Glucose 06/19/2024 138     Calcium 06/19/2024 10.0     AST 06/19/2024 19     ALT 06/19/2024 27     Alkaline Phosphatase 06/19/2024 97     Total Protein 06/19/2024 7.2     Albumin 06/19/2024 4.4     Total Bilirubin 06/19/2024 0.91     eGFR 06/19/2024 69     Lipase 06/19/2024 12     CRP 06/19/2024 22.7 (H)     LACTIC ACID 06/19/2024 1.8     hs TnI 0hr 06/19/2024 <2     Color, UA 06/19/2024 Light Yellow     Clarity, UA 06/19/2024 Clear     Specific Gravity, UA 06/19/2024 >=1.050 (H)     pH, UA 06/19/2024 6.0     Leukocytes, UA 06/19/2024 Negative     Nitrite, UA 06/19/2024 Negative     Protein, UA 06/19/2024 50 (1+) (A)     Glucose, UA 06/19/2024 Negative     Ketones, UA 06/19/2024 20 (1+) (A)     Urobilinogen, UA 06/19/2024 <2.0     Bilirubin, UA 06/19/2024 Negative     Occult Blood, UA 06/19/2024 Negative     TSH 3RD GENERATON 06/19/2024 2.794     RBC, UA 06/19/2024 1-2     WBC, UA 06/19/2024 None Seen     Epithelial Cells 06/19/2024 Occasional     Bacteria, UA 06/19/2024 None Seen     MUCUS THREADS 06/19/2024 Occasional (A)     WBC 06/20/2024 3.63 (L)     RBC 06/20/2024 4.46     Hemoglobin 06/20/2024 13.3     Hematocrit 06/20/2024 39.1     MCV 06/20/2024 88     MCH 06/20/2024 29.1     MCHC 06/20/2024 33.2     RDW 06/20/2024 12.5     Platelets 06/20/2024 257     MPV 06/20/2024 10.4     Sodium 06/20/2024 140     Potassium 06/20/2024 3.6     Chloride 06/20/2024 111 (H)     CO2 06/20/2024 24     ANION GAP 06/20/2024 5     BUN 06/20/2024 11     Creatinine 06/20/2024 0.76     Glucose 06/20/2024 89     Calcium 06/20/2024 8.8     eGFR 06/20/2024 89        Imaging Studies: I have personally reviewed pertinent imaging  studies.

## 2024-06-20 NOTE — ASSESSMENT & PLAN NOTE
POA, with history of microscopic colitis diagnosed recently with 3-5 days of cramping, diarrhea, and poor oral intake. States she started bioma probiotic and is unsure if this is may have contributed to acute illness  CT scan with pancolitis and CRP elevated   No fevers   Bacterial stool PCR negative   Giardia negative    Calprotectin pending   C.diff pending   Continue IVF  Supportive care  Tolerating clear liquids - plan to advance tomorrow as patient resistant at this time   Antibiotics vs steroids pending cultures

## 2024-06-20 NOTE — PROGRESS NOTES
[facility]  Progress Note  Name: Xenia Jones I  MRN: [id number]  Unit/Bed#: S -01 I Date of Admission: 6/19/2024   Date of Service: 6/20/2024 I Hospital Day: 1    Assessment & Plan   * Pancolitis (HCC)  Assessment & Plan  POA, with history of microscopic colitis diagnosed recently with 3-5 days of cramping, diarrhea, and poor oral intake. States she started bioma probiotic and is unsure if this is may have contributed to acute illness  CT scan with pancolitis and CRP elevated   No fevers   Bacterial stool PCR negative   Giardia negative    Calprotectin pending   C.diff pending   Continue IVF  Supportive care  Tolerating clear liquids - plan to advance tomorrow as patient resistant at this time   Antibiotics vs steroids pending cultures     Hashimoto's thyroiditis  Assessment & Plan  Reported by patient, not on treatment currently   Follow up outpatient          VTE Pharmacologic Prophylaxis: VTE Score: 3 Moderate Risk (Score 3-4) - Pharmacological DVT Prophylaxis Ordered: enoxaparin (Lovenox).    Mobility:   Basic Mobility Inpatient Raw Score: 24  JH-HLM Goal: 8: Walk 250 feet or more  JH-HLM Achieved: 7: Walk 25 feet or more  JH-HLM Goal NOT achieved. Continue with multidisciplinary rounding and encourage appropriate mobility to improve upon JH-HLM goals.    Patient Centered Rounds: I performed bedside rounds with nursing staff today.   Discussions with Specialists or Other Care Team Provider: None    Education and Discussions with Family / Patient: Patient declined call to .     Total Time Spent on Date of Encounter in care of patient:  This time was spent on one or more of the following: performing physical exam; counseling and coordination of care; obtaining or reviewing history; documenting in the medical record; reviewing/ordering tests, medications or procedures; communicating with other healthcare professionals and discussing with patient's  family/caregivers.    Current Length of Stay: 1 day(s)  Current Patient Status: Inpatient   Certification Statement: The patient will continue to require additional inpatient hospital stay due to pending c.diff culture, IVF, inability to tolerate oral intake  Discharge Plan: Anticipate discharge tomorrow to home.    Code Status: Level 1 - Full Code    Subjective:   Seen and examined. No acute events overnight. States she is feeling a little better and her diarrhea is slowly improving. States it is still watery, however denies blood. She is tolerating liquid diet at this time but is hesitant to advancing at this time. Patient states she started bioma probiotic 3 days prior to acute illness and is wondering if that may have contributed to her symptoms.     Objective:     Vitals:   Temp (24hrs), Av.9 °F (36.6 °C), Min:97.3 °F (36.3 °C), Max:98.4 °F (36.9 °C)    Temp:  [97.3 °F (36.3 °C)-98.4 °F (36.9 °C)] 97.3 °F (36.3 °C)  HR:  [61-74] 61  Resp:  [16] 16  BP: (125-134)/(73-81) 129/81  SpO2:  [95 %-96 %] 96 %  Body mass index is 37.84 kg/m².     Input and Output Summary (last 24 hours):     Intake/Output Summary (Last 24 hours) at 2024 1131  Last data filed at 2024 0845  Gross per 24 hour   Intake 2480 ml   Output --   Net 2480 ml       Physical Exam:   Physical Exam  Constitutional:       General: She is not in acute distress.  HENT:      Mouth/Throat:      Mouth: Mucous membranes are moist.   Eyes:      Conjunctiva/sclera: Conjunctivae normal.   Cardiovascular:      Heart sounds: Normal heart sounds.   Pulmonary:      Breath sounds: Normal breath sounds.   Abdominal:      Palpations: Abdomen is soft.      Tenderness: There is abdominal tenderness (mild pain with palpation to lower abdomen).   Musculoskeletal:      Right lower leg: No edema.      Left lower leg: No edema.   Skin:     General: Skin is warm and dry.   Neurological:      Mental Status: Mental status is at baseline.          Additional Data:      Labs:  Results from last 7 days   Lab Units 06/20/24  0600 06/19/24  0937   WBC Thousand/uL 3.63* 6.71   HEMOGLOBIN g/dL 13.3 16.2*   HEMATOCRIT % 39.1 47.3*   PLATELETS Thousands/uL 257 350   SEGS PCT %  --  70   LYMPHO PCT %  --  15   MONO PCT %  --  12   EOS PCT %  --  3     Results from last 7 days   Lab Units 06/20/24  0600 06/19/24  0937   SODIUM mmol/L 140 136   POTASSIUM mmol/L 3.6 3.5   CHLORIDE mmol/L 111* 105   CO2 mmol/L 24 21   BUN mg/dL 11 13   CREATININE mg/dL 0.76 0.94   ANION GAP mmol/L 5 10   CALCIUM mg/dL 8.8 10.0   ALBUMIN g/dL  --  4.4   TOTAL BILIRUBIN mg/dL  --  0.91   ALK PHOS U/L  --  97   ALT U/L  --  27   AST U/L  --  19   GLUCOSE RANDOM mg/dL 89 138                 Results from last 7 days   Lab Units 06/19/24  0957   LACTIC ACID mmol/L 1.8       Lines/Drains:  Invasive Devices       Peripheral Intravenous Line  Duration             Peripheral IV 06/19/24 Left Antecubital 1 day                          Imaging: Reviewed radiology reports from this admission including: abdominal/pelvic CT    Recent Cultures (last 7 days):         Last 24 Hours Medication List:   Current Facility-Administered Medications   Medication Dose Route Frequency Provider Last Rate    acetaminophen  650 mg Oral Q6H PRN Augie Allan PA-C      dicyclomine  10 mg Oral TID AC DACIA Edwards      enoxaparin  40 mg Subcutaneous Daily Augie Allan PA-C      mesalamine  800 mg Oral TID DACIA Edwards      multi-electrolyte  125 mL/hr Intravenous Continuous Angelina Ruelas PA-C 125 mL/hr (06/20/24 0854)    ondansetron  4 mg Intravenous Q6H PRN Augie Allan PA-C          Today, Patient Was Seen By: Angelina Ruelas PA-C    **Please Note: This note may have been constructed using a voice recognition system.**

## 2024-06-20 NOTE — PLAN OF CARE
Problem: PAIN - ADULT  Goal: Verbalizes/displays adequate comfort level or baseline comfort level  Description: Interventions:  - Encourage patient to monitor pain and request assistance  - Assess pain using appropriate pain scale  - Administer analgesics based on type and severity of pain and evaluate response  - Implement non-pharmacological measures as appropriate and evaluate response  - Consider cultural and social influences on pain and pain management  - Notify physician/advanced practitioner if interventions unsuccessful or patient reports new pain  Outcome: Progressing     Problem: INFECTION - ADULT  Goal: Absence or prevention of progression during hospitalization  Description: INTERVENTIONS:  - Assess and monitor for signs and symptoms of infection  - Monitor lab/diagnostic results  - Monitor all insertion sites, i.e. indwelling lines, tubes, and drains  - Monitor endotracheal if appropriate and nasal secretions for changes in amount and color  - [place] appropriate cooling/warming therapies per order  - Administer medications as ordered  - Instruct and encourage patient and family to use good hand hygiene technique  - Identify and instruct in appropriate isolation precautions for identified infection/condition  Outcome: Progressing  Goal: Absence of fever/infection during neutropenic period  Description: INTERVENTIONS:  - Monitor WBC    Outcome: Progressing     Problem: SAFETY ADULT  Goal: Patient will remain free of falls  Description: INTERVENTIONS:  - Educate patient/family on patient safety including physical limitations  - Instruct patient to call for assistance with activity   - Consult OT/PT to assist with strengthening/mobility   - Keep Call bell within reach  - Keep bed low and locked with side rails adjusted as appropriate  - Keep care items and personal belongings within reach  - Initiate and maintain comfort rounds  - Make Fall Risk Sign visible to staff  - Offer Toileting every  Hours,  in advance of need  - Initiate/Maintain alarm  - Obtain necessary fall risk management equipment:   - Apply yellow socks and bracelet for high fall risk patients  - Consider moving patient to room near nurses station  Outcome: Progressing  Goal: Maintain or return to baseline ADL function  Description: INTERVENTIONS:  -  Assess patient's ability to carry out ADLs; assess patient's baseline for ADL function and identify physical deficits which impact ability to perform ADLs (bathing, care of mouth/teeth, toileting, grooming, dressing, etc.)  - Assess/evaluate cause of self-care deficits   - Assess range of motion  - Assess patient's mobility; develop plan if impaired  - Assess patient's need for assistive devices and provide as appropriate  - Encourage maximum independence but intervene and supervise when necessary  - Involve family in performance of ADLs  - Assess for home care needs following discharge   - Consider OT consult to assist with ADL evaluation and planning for discharge  - Provide patient education as appropriate  Outcome: Progressing  Goal: Maintains/Returns to pre admission functional level  Description: INTERVENTIONS:  - Perform AM-PAC 6 Click Basic Mobility/ Daily Activity assessment daily.  - Set and communicate daily mobility goal to care team and patient/family/caregiver.   - Collaborate with rehabilitation services on mobility goals if consulted  - Perform Range of Motion  times a day.  - Reposition patient every  hours.  - Dangle patient  times a day  - Stand patient  times a day  - Ambulate patient  times a day  - Out of bed to chair  times a day   - Out of bed for meals  times a day  - Out of bed for toileting  - Record patient progress and toleration of activity level   Outcome: Progressing

## 2024-06-20 NOTE — UTILIZATION REVIEW
Initial Clinical Review    Admission: Date/Time/Statement:   Admission Orders (From admission, onward)       Ordered        06/19/24 1228  INPATIENT ADMISSION  Once                          Orders Placed This Encounter   Procedures    INPATIENT ADMISSION     Standing Status:   Standing     Number of Occurrences:   1     Order Specific Question:   Level of Care     Answer:   Med Surg [16]     Order Specific Question:   Estimated length of stay     Answer:   More than 2 Midnights     Order Specific Question:   Certification     Answer:   I certify that inpatient services are medically necessary for this patient for a duration of greater than two midnights. See H&P and MD Progress Notes for additional information about the patient's course of treatment.     ED Arrival Information       Expected   -    Arrival   6/19/2024 09:19    Acuity   Urgent              Means of arrival   Ambulance    Escorted by   [facility]    Service   Hospitalist    Admission type   Emergency              Arrival complaint   ems             Chief Complaint   Patient presents with    Nausea     N/V/D [id number]       Initial Presentation: 54 y.o. female with a PMH of microscopic colitis who presents to the ED from home with abdominal pain. She reports symptoms over the last 3-5 days,  generalized cramping abdominal pain and multiple watery episodes of diarrhea, vomiting and chills.  Reports that she has been on Lialda for about 3 weeks now as her diagnosis is relatively new. S. She reports a recent diagnosis of Hashimotos, but she reports she is not on treatment at this time. ED CT scan revealed pancolitis. CRP is elevated at 22.7.  Exam: Mucous membranes dry. Generalized abdominal tenderness.     6/19 Inpatient admission for evaluation and treatment of pancolitis:  IVF, stool studies, Giardia, calprotectin, consult Gastroenterology.      6/20 Internal Medicine:  Patient states diarrhea is slowly improving. Still watery, however  denies blood. She is tolerating liquid diet at this time but is hesitant to advancing at this time. Continue IVF, C diff culture pending. Exam: Mild pain with palpation to lower abdomen.   Gastroenterology consult:  #1 Acute on chronic diarrhea. #2 Pancolitis on CT.#3 Lymphocytic colitis. CT abdomen/pelvis on admission notable for infectious or inflammatory pancolitis. She remains afebrile w/o leukocytosis, CRP elevated 22.7 from 4.6 in 2020. Symptoms are currently improving w/ supportive care, suspect possible infectious process with underlying lymphocytic colitis. She had only 2 liquid BM so far this morning & abdominal cramping is improving, denies any pain on exam. N/V resolved & is tolerating clear liquid diet. Plan:  Follow up stool studies. Advance to low residue, lactose restricted diet as tolerated. Continue Mesalamine, switched to Delzicol as Lialda is not available in the hospital. Start bentyl 10 mg TID prior to meals. Consider Questran pending course. Consider Budesonide if stool studies negative & symptoms fail to improve. Continue IV hydration, repletion of electrolytes as needed.           ED Triage Vitals [06/19/24 0926]   Temperature Pulse Respirations Blood Pressure SpO2 Pain Score   97.6 °F (36.4 °C) 87 16 121/88 94 % 6     Weight (last 2 days)       Date/Time Weight    06/19/24 1500 110 (241.62)    06/19/24 0926 112 (246.25)            Vital Signs (last 3 days)       Date/Time Temp Pulse Resp BP MAP (mmHg) SpO2 O2 Device Patient Position - Orthostatic VS Pain    06/20/24 0848 -- -- -- -- -- -- -- -- 4    06/20/24 07:47:58 97.3 °F (36.3 °C) 61 -- 129/81 97 96 % -- -- --    06/20/24 0300 -- -- -- -- -- -- -- -- No Pain    06/19/24 22:45:41 98.1 °F (36.7 °C) 71 -- 125/73 90 96 % -- -- --    06/19/24 1550 -- -- -- -- -- -- -- -- No Pain    06/19/24 1535 -- -- -- -- -- -- -- -- No Pain    06/19/24 1500 98.4 °F (36.9 °C) 74 16 134/81 99 95 % None (Room air) Lying --    06/19/24 0926 97.6 °F (36.4 °C)  87 16 121/88 -- 94 % None (Room air) Lying 6              Pertinent Labs/Diagnostic Test Results:   Radiology:  CT abdomen pelvis with contrast   Final Interpretation by Barber Wade MD (06/19 1132)      Mild uncomplicated infectious or inflammatory pancolitis. No free air or abscess.      Workstation performed: [id number]           Cardiology:  6/19  ECG 12 lead      Normal sinus rhythm   Normal ECG   When compared with ECG of 06-DEC-2023 01:10,   No significant change was found               Results from last 7 days   Lab Units 06/20/24  0600 06/19/24  0937   WBC Thousand/uL 3.63* 6.71   HEMOGLOBIN g/dL 13.3 16.2*   HEMATOCRIT % 39.1 47.3*   PLATELETS Thousands/uL 257 350   TOTAL NEUT ABS Thousands/µL  --  4.64         Results from last 7 days   Lab Units 06/20/24  0600 06/19/24  0937   SODIUM mmol/L 140 136   POTASSIUM mmol/L 3.6 3.5   CHLORIDE mmol/L 111* 105   CO2 mmol/L 24 21   ANION GAP mmol/L 5 10   BUN mg/dL 11 13   CREATININE mg/dL 0.76 0.94   EGFR ml/min/1.73sq m 89 69   CALCIUM mg/dL 8.8 10.0     Results from last 7 days   Lab Units 06/19/24  0937   AST U/L 19   ALT U/L 27   ALK PHOS U/L 97   TOTAL PROTEIN g/dL 7.2   ALBUMIN g/dL 4.4   TOTAL BILIRUBIN mg/dL 0.91         Results from last 7 days   Lab Units 06/20/24  0600 06/19/24  0937   GLUCOSE RANDOM mg/dL 89 138             Results from last 7 days   Lab Units 06/19/24  0957   HS TNI 0HR ng/L <2             Results from last 7 days   Lab Units 06/19/24  0937   TSH 3RD GENERATON uIU/mL 2.794         Results from last 7 days   Lab Units 06/19/24  0957   LACTIC ACID mmol/L 1.8      Latest Reference Range & Units 06/19/24 12:53   GIARDIA ANTIGEN STOOL Negative  Negative             Component  Ref Range & Units 6/19/24 1253   Salmonella sp PCR  Negative Negative   Comment:     Shigella sp/Enteroinvasive E. coli (EIEC) PCR  Negative Negative   Comment:     Campylobacter sp (jejuni and coli) PCR  Negative Negative   Comment:     Shiga toxin 1/Shiga  toxin 2 genes PCR  Negative Negative                Results from last 7 days   Lab Units 06/19/24  0937   LIPASE u/L 12     Results from last 7 days   Lab Units 06/19/24  0937   CRP mg/L 22.7*                 Results from last 7 days   Lab Units 06/19/24  1336   CLARITY UA  Clear   COLOR UA  Light Yellow   SPEC GRAV UA  >=1.050*   PH UA  6.0   GLUCOSE UA mg/dl Negative   KETONES UA mg/dl 20 (1+)*   BLOOD UA  Negative   PROTEIN UA mg/dl 50 (1+)*   NITRITE UA  Negative   BILIRUBIN UA  Negative   UROBILINOGEN UA (BE) mg/dl <2.0   LEUKOCYTES UA  Negative   WBC UA /hpf None Seen   RBC UA /hpf 1-2   BACTERIA UA /hpf None Seen   EPITHELIAL CELLS WET PREP /hpf Occasional   MUCUS THREADS  Occasional*               ED Treatment-Medication Administration from 06/19/2024 0919 to 06/19/2024 1450         Date/Time Order Dose Route Action     06/19/2024 0959 sodium chloride 0.9 % bolus 1,000 mL 1,000 mL Intravenous New Bag     06/19/2024 1046 iohexol (OMNIPAQUE) 350 MG/ML injection (MULTI-DOSE) 100 mL 100 mL Intravenous Given     06/19/2024 1241 sodium chloride 0.9 % infusion 150 mL/hr Intravenous New Bag            Past Medical History:   Diagnosis Date    Anxiety     Asthma     no current issues    Colitis     Colon polyp     Dysfunctional uterine bleeding     H/O total vaginal hysterectomy 02/19/2021    S/P left oophorectomy 02/19/2021     Present on Admission:   Pancolitis (HCC)   Hashimoto's thyroiditis      Admitting Diagnosis: Diarrhea [R19.7]  Vomiting [R11.10]  Pancolitis (HCC) [K51.00]  Lymphocytic colitis [K52.832]  Age/Sex: 54 y.o. female      Admission Orders: Clear liquid diet.       Scheduled Medications:      enoxaparin, 40 mg, Subcutaneous, Daily      dicyclomine (BENTYL) capsule 10 mg  Dose: 10 mg  Freq: 3 times daily before meals Route: PO  Start: 06/20/24 1130    mesalamine (DELZICOL) delayed release capsule 800 mg  Dose: 800 mg  Freq: 3 times daily Route: PO  Start: 06/20/24 1100      Continuous IV  Infusions:      multi-electrolyte, 125 mL/hr, Intravenous, Continuous      PRN Meds:      acetaminophen, 650 mg, Oral, Q6H PRN  ondansetron, 4 mg, Intravenous, Q6H PRN        IP CONSULT TO GASTROENTEROLOGY            Network Utilization Review Department  ATTENTION: Please call with any questions or concerns to [phone] and carefully listen to the prompts so that you are directed to the right person. All voicemails are confidential.   For Discharge needs, contact Care Management DC Support Team at [phone] opt. 2  Send all requests for admission clinical reviews, approved or denied determinations and any other requests to dedicated fax number below belonging to the campus where the patient is receiving treatment. List of dedicated fax numbers for the Facilities:  FACILITY NAME UR FAX NUMBER   ADMISSION DENIALS (Administrative/Medical Necessity) [phone]   DISCHARGE SUPPORT TEAM (NETWORK) [phone]   PARENT CHILD HEALTH (Maternity/NICU/Pediatrics) [phone]   [facility] [phone]   [facility] [phone]   Critical access hospital [phone]   [facility] [phone]   [facility] [phone]   [facility] [phone]   [facility] [phone]   [facility] [phone]   [facility] [phone]   [facility] [phone]   [facility] [phone]   [facility] [phone]

## 2024-06-20 NOTE — PLAN OF CARE
Problem: PAIN - ADULT  Goal: Verbalizes/displays adequate comfort level or baseline comfort level  Description: Interventions:  - Encourage patient to monitor pain and request assistance  - Assess pain using appropriate pain scale  - Administer analgesics based on type and severity of pain and evaluate response  - Implement non-pharmacological measures as appropriate and evaluate response  - Consider cultural and social influences on pain and pain management  - Notify physician/advanced practitioner if interventions unsuccessful or patient reports new pain  Outcome: Progressing     Problem: INFECTION - ADULT  Goal: Absence or prevention of progression during hospitalization  Description: INTERVENTIONS:  - Assess and monitor for signs and symptoms of infection  - Monitor lab/diagnostic results  - Monitor all insertion sites, i.e. indwelling lines, tubes, and drains  - Monitor endotracheal if appropriate and nasal secretions for changes in amount and color  - [place] appropriate cooling/warming therapies per order  - Administer medications as ordered  - Instruct and encourage patient and family to use good hand hygiene technique  - Identify and instruct in appropriate isolation precautions for identified infection/condition  Outcome: Progressing  Goal: Absence of fever/infection during neutropenic period  Description: INTERVENTIONS:  - Monitor WBC    Outcome: Progressing     Problem: SAFETY ADULT  Goal: Patient will remain free of falls  Description: INTERVENTIONS:  - Educate patient/family on patient safety including physical limitations  - Instruct patient to call for assistance with activity   - Consult OT/PT to assist with strengthening/mobility   - Keep Call bell within reach  - Keep bed low and locked with side rails adjusted as appropriate  - Keep care items and personal belongings within reach  - Initiate and maintain comfort rounds  - Make Fall Risk Sign visible to staff  - Offer Toileting every 2 Hours,  in advance of need  - Initiate/Maintain bed and chair alarm  - Obtain necessary fall risk management equipment:   - Apply yellow socks and bracelet for high fall risk patients  - Consider moving patient to room near nurses station  Outcome: Progressing  Goal: Maintain or return to baseline ADL function  Description: INTERVENTIONS:  -  Assess patient's ability to carry out ADLs; assess patient's baseline for ADL function and identify physical deficits which impact ability to perform ADLs (bathing, care of mouth/teeth, toileting, grooming, dressing, etc.)  - Assess/evaluate cause of self-care deficits   - Assess range of motion  - Assess patient's mobility; develop plan if impaired  - Assess patient's need for assistive devices and provide as appropriate  - Encourage maximum independence but intervene and supervise when necessary  - Involve family in performance of ADLs  - Assess for home care needs following discharge   - Consider OT consult to assist with ADL evaluation and planning for discharge  - Provide patient education as appropriate  Outcome: Progressing  Goal: Maintains/Returns to pre admission functional level  Description: INTERVENTIONS:  - Perform AM-PAC 6 Click Basic Mobility/ Daily Activity assessment daily.  - Set and communicate daily mobility goal to care team and patient/family/caregiver.   - Collaborate with rehabilitation services on mobility goals if consulted  - Perform Range of Motion 3 times a day.  - Reposition patient every 2 hours.  - Dangle patient 3 times a day  - Stand patient 3 times a day  - Ambulate patient 3 times a day  - Out of bed to chair 3 times a day   - Out of bed for meals 3 times a day  - Out of bed for toileting  - Record patient progress and toleration of activity level   Outcome: Progressing     Problem: Knowledge Deficit  Goal: Patient/family/caregiver demonstrates understanding of disease process, treatment plan, medications, and discharge instructions  Description:  Complete learning assessment and assess knowledge base.  Interventions:  - Provide teaching at level of understanding  - Provide teaching via preferred learning methods  Outcome: Progressing

## 2024-06-20 NOTE — TELEPHONE ENCOUNTER
Called patient to advise in reply to her MyChart messages about a note requesting to work from home.  That Donna Simmons is very sorry to hear that you are in the hospital and hopes you feel better soon.  However, something like this would be best handled by your family doctor or your hospitalist /internal medicine team while admitted.

## 2024-06-21 LAB
ANION GAP SERPL CALCULATED.3IONS-SCNC: 5 MMOL/L (ref 4–13)
BUN SERPL-MCNC: 6 MG/DL (ref 5–25)
C DIFF TOX GENS STL QL NAA+PROBE: NEGATIVE
CALCIUM SERPL-MCNC: 8.8 MG/DL (ref 8.4–10.2)
CHLORIDE SERPL-SCNC: 109 MMOL/L (ref 96–108)
CO2 SERPL-SCNC: 27 MMOL/L (ref 21–32)
CREAT SERPL-MCNC: 0.75 MG/DL (ref 0.6–1.3)
ERYTHROCYTE [DISTWIDTH] IN BLOOD BY AUTOMATED COUNT: 12.4 % (ref 11.6–15.1)
GFR SERPL CREATININE-BSD FRML MDRD: 91 ML/MIN/1.73SQ M
GLUCOSE SERPL-MCNC: 96 MG/DL (ref 65–140)
HCT VFR BLD AUTO: 38.5 % (ref 34.8–46.1)
HGB BLD-MCNC: 13 G/DL (ref 11.5–15.4)
MCH RBC QN AUTO: 29.1 PG (ref 26.8–34.3)
MCHC RBC AUTO-ENTMCNC: 33.8 G/DL (ref 31.4–37.4)
MCV RBC AUTO: 86 FL (ref 82–98)
PLATELET # BLD AUTO: 260 THOUSANDS/UL (ref 149–390)
PMV BLD AUTO: 10.3 FL (ref 8.9–12.7)
POTASSIUM SERPL-SCNC: 3.4 MMOL/L (ref 3.5–5.3)
RBC # BLD AUTO: 4.46 MILLION/UL (ref 3.81–5.12)
SODIUM SERPL-SCNC: 141 MMOL/L (ref 135–147)
WBC # BLD AUTO: 4.66 THOUSAND/UL (ref 4.31–10.16)

## 2024-06-21 PROCEDURE — 80048 BASIC METABOLIC PNL TOTAL CA: CPT

## 2024-06-21 PROCEDURE — 99232 SBSQ HOSP IP/OBS MODERATE 35: CPT | Performed by: INTERNAL MEDICINE

## 2024-06-21 PROCEDURE — 85027 COMPLETE CBC AUTOMATED: CPT

## 2024-06-21 PROCEDURE — 99232 SBSQ HOSP IP/OBS MODERATE 35: CPT

## 2024-06-21 RX ORDER — METRONIDAZOLE 500 MG/100ML
500 INJECTION, SOLUTION INTRAVENOUS EVERY 8 HOURS
Status: DISCONTINUED | OUTPATIENT
Start: 2024-06-21 | End: 2024-06-23 | Stop reason: HOSPADM

## 2024-06-21 RX ORDER — CHOLESTYRAMINE LIGHT 4 G/5.7G
4 POWDER, FOR SUSPENSION ORAL 2 TIMES DAILY
Status: DISCONTINUED | OUTPATIENT
Start: 2024-06-21 | End: 2024-06-21

## 2024-06-21 RX ORDER — DICYCLOMINE HYDROCHLORIDE 10 MG/1
20 CAPSULE ORAL 4 TIMES DAILY PRN
Status: DISCONTINUED | OUTPATIENT
Start: 2024-06-21 | End: 2024-06-23 | Stop reason: HOSPADM

## 2024-06-21 RX ORDER — POTASSIUM CHLORIDE 20 MEQ/1
20 TABLET, EXTENDED RELEASE ORAL ONCE
Status: COMPLETED | OUTPATIENT
Start: 2024-06-21 | End: 2024-06-21

## 2024-06-21 RX ORDER — DICYCLOMINE HYDROCHLORIDE 10 MG/1
20 CAPSULE ORAL 2 TIMES DAILY
Status: DISCONTINUED | OUTPATIENT
Start: 2024-06-21 | End: 2024-06-21

## 2024-06-21 RX ORDER — DICYCLOMINE HYDROCHLORIDE 10 MG/1
10 CAPSULE ORAL
Status: DISCONTINUED | OUTPATIENT
Start: 2024-06-21 | End: 2024-06-21

## 2024-06-21 RX ORDER — ESCITALOPRAM OXALATE 20 MG/1
20 TABLET ORAL DAILY
Status: DISCONTINUED | OUTPATIENT
Start: 2024-06-21 | End: 2024-06-23 | Stop reason: HOSPADM

## 2024-06-21 RX ADMIN — POTASSIUM CHLORIDE 20 MEQ: 1500 TABLET, EXTENDED RELEASE ORAL at 08:52

## 2024-06-21 RX ADMIN — ACETAMINOPHEN 650 MG: 325 TABLET, FILM COATED ORAL at 16:02

## 2024-06-21 RX ADMIN — METRONIDAZOLE 500 MG: 500 INJECTION, SOLUTION INTRAVENOUS at 22:05

## 2024-06-21 RX ADMIN — ENOXAPARIN SODIUM 40 MG: 40 INJECTION SUBCUTANEOUS at 08:47

## 2024-06-21 RX ADMIN — SODIUM CHLORIDE, SODIUM GLUCONATE, SODIUM ACETATE, POTASSIUM CHLORIDE, MAGNESIUM CHLORIDE, SODIUM PHOSPHATE, DIBASIC, AND POTASSIUM PHOSPHATE 125 ML/HR: .53; .5; .37; .037; .03; .012; .00082 INJECTION, SOLUTION INTRAVENOUS at 23:41

## 2024-06-21 RX ADMIN — ACETAMINOPHEN 650 MG: 325 TABLET, FILM COATED ORAL at 22:05

## 2024-06-21 RX ADMIN — MESALAMINE 800 MG: 400 CAPSULE, DELAYED RELEASE ORAL at 08:47

## 2024-06-21 RX ADMIN — METRONIDAZOLE 500 MG: 500 INJECTION, SOLUTION INTRAVENOUS at 16:49

## 2024-06-21 RX ADMIN — CHOLESTYRAMINE 4 G: 4 POWDER, FOR SUSPENSION ORAL at 12:37

## 2024-06-21 RX ADMIN — ESCITALOPRAM OXALATE 20 MG: 20 TABLET ORAL at 12:37

## 2024-06-21 RX ADMIN — CEFTRIAXONE SODIUM 2000 MG: 10 INJECTION, POWDER, FOR SOLUTION INTRAVENOUS at 15:54

## 2024-06-21 RX ADMIN — SODIUM CHLORIDE, SODIUM GLUCONATE, SODIUM ACETATE, POTASSIUM CHLORIDE, MAGNESIUM CHLORIDE, SODIUM PHOSPHATE, DIBASIC, AND POTASSIUM PHOSPHATE 125 ML/HR: .53; .5; .37; .037; .03; .012; .00082 INJECTION, SOLUTION INTRAVENOUS at 11:08

## 2024-06-21 RX ADMIN — DICYCLOMINE HYDROCHLORIDE 10 MG: 10 CAPSULE ORAL at 07:20

## 2024-06-21 NOTE — PLAN OF CARE
Problem: PAIN - ADULT  Goal: Verbalizes/displays adequate comfort level or baseline comfort level  Description: Interventions:  - Encourage patient to monitor pain and request assistance  - Assess pain using appropriate pain scale  - Administer analgesics based on type and severity of pain and evaluate response  - Implement non-pharmacological measures as appropriate and evaluate response  - Consider cultural and social influences on pain and pain management  - Notify physician/advanced practitioner if interventions unsuccessful or patient reports new pain  Outcome: Progressing     Problem: INFECTION - ADULT  Goal: Absence or prevention of progression during hospitalization  Description: INTERVENTIONS:  - Assess and monitor for signs and symptoms of infection  - Monitor lab/diagnostic results  - Monitor all insertion sites, i.e. indwelling lines, tubes, and drains  - Monitor endotracheal if appropriate and nasal secretions for changes in amount and color  - [place] appropriate cooling/warming therapies per order  - Administer medications as ordered  - Instruct and encourage patient and family to use good hand hygiene technique  - Identify and instruct in appropriate isolation precautions for identified infection/condition  Outcome: Progressing  Goal: Absence of fever/infection during neutropenic period  Description: INTERVENTIONS:  - Monitor WBC    Outcome: Progressing     Problem: SAFETY ADULT  Goal: Patient will remain free of falls  Description: INTERVENTIONS:  - Educate patient/family on patient safety including physical limitations  - Instruct patient to call for assistance with activity   - Consult OT/PT to assist with strengthening/mobility   - Keep Call bell within reach  - Keep bed low and locked with side rails adjusted as appropriate  - Keep care items and personal belongings within reach  - Initiate and maintain comfort rounds  - Make Fall Risk Sign visible to staff  - Apply yellow socks and bracelet  for high fall risk patients  - Consider moving patient to room near nurses station  Outcome: Progressing  Goal: Maintain or return to baseline ADL function  Description: INTERVENTIONS:  -  Assess patient's ability to carry out ADLs; assess patient's baseline for ADL function and identify physical deficits which impact ability to perform ADLs (bathing, care of mouth/teeth, toileting, grooming, dressing, etc.)  - Assess/evaluate cause of self-care deficits   - Assess range of motion  - Assess patient's mobility; develop plan if impaired  - Assess patient's need for assistive devices and provide as appropriate  - Encourage maximum independence but intervene and supervise when necessary  - Involve family in performance of ADLs  - Assess for home care needs following discharge   - Consider OT consult to assist with ADL evaluation and planning for discharge  - Provide patient education as appropriate  Outcome: Progressing  Goal: Maintains/Returns to pre admission functional level  Description: INTERVENTIONS:  - Perform AM-PAC 6 Click Basic Mobility/ Daily Activity assessment daily.  - Set and communicate daily mobility goal to care team and patient/family/caregiver.   - Collaborate with rehabilitation services on mobility goals if consulted  - Perform Range of Motion 3 times a day.  - Reposition patient every 2 hours.  - Dangle patient 3 times a day  - Stand patient 3 times a day  - Ambulate patient 3 times a day  - Out of bed to chair 3 times a day   - Out of bed for meals 3 times a day  - Out of bed for toileting  - Record patient progress and toleration of activity level   Outcome: Progressing     Problem: Knowledge Deficit  Goal: Patient/family/caregiver demonstrates understanding of disease process, treatment plan, medications, and discharge instructions  Description: Complete learning assessment and assess knowledge base.  Interventions:  - Provide teaching at level of understanding  - Provide teaching via preferred  learning methods  Outcome: Progressing     Problem: METABOLIC, FLUID AND ELECTROLYTES - ADULT  Goal: Electrolytes maintained within normal limits  Description: INTERVENTIONS:  - Monitor labs and assess patient for signs and symptoms of electrolyte imbalances  - Administer electrolyte replacement as ordered  - Monitor response to electrolyte replacements, including repeat lab results as appropriate  - Instruct patient on fluid and nutrition as appropriate  Outcome: Progressing  Goal: Fluid balance maintained  Description: INTERVENTIONS:  - Monitor labs   - Monitor I/O and WT  - Instruct patient on fluid and nutrition as appropriate  - Assess for signs & symptoms of volume excess or deficit  Outcome: Progressing  Goal: Glucose maintained within target range  Description: INTERVENTIONS:  - Monitor Blood Glucose as ordered  - Assess for signs and symptoms of hyperglycemia and hypoglycemia  - Administer ordered medications to maintain glucose within target range  - Assess nutritional intake and initiate nutrition service referral as needed  Outcome: Progressing

## 2024-06-21 NOTE — PLAN OF CARE
Problem: PAIN - ADULT  Goal: Verbalizes/displays adequate comfort level or baseline comfort level  Description: Interventions:  - Encourage patient to monitor pain and request assistance  - Assess pain using appropriate pain scale  - Administer analgesics based on type and severity of pain and evaluate response  - Implement non-pharmacological measures as appropriate and evaluate response  - Consider cultural and social influences on pain and pain management  - Notify physician/advanced practitioner if interventions unsuccessful or patient reports new pain  Outcome: Progressing     Problem: INFECTION - ADULT  Goal: Absence or prevention of progression during hospitalization  Description: INTERVENTIONS:  - Assess and monitor for signs and symptoms of infection  - Monitor lab/diagnostic results  - Monitor all insertion sites, i.e. indwelling lines, tubes, and drains  - Monitor endotracheal if appropriate and nasal secretions for changes in amount and color  - [place] appropriate cooling/warming therapies per order  - Administer medications as ordered  - Instruct and encourage patient and family to use good hand hygiene technique  - Identify and instruct in appropriate isolation precautions for identified infection/condition  Outcome: Progressing     Problem: METABOLIC, FLUID AND ELECTROLYTES - ADULT  Goal: Electrolytes maintained within normal limits  Description: INTERVENTIONS:  - Monitor labs and assess patient for signs and symptoms of electrolyte imbalances  - Administer electrolyte replacement as ordered  - Monitor response to electrolyte replacements, including repeat lab results as appropriate  - Instruct patient on fluid and nutrition as appropriate  Outcome: Progressing     Problem: METABOLIC, FLUID AND ELECTROLYTES - ADULT  Goal: Fluid balance maintained  Description: INTERVENTIONS:  - Monitor labs   - Monitor I/O and WT  - Instruct patient on fluid and nutrition as appropriate  - Assess for signs &  symptoms of volume excess or deficit  Outcome: Progressing     Problem: Nutrition/Hydration-ADULT  Goal: Nutrient/Hydration intake appropriate for improving, restoring or maintaining nutritional needs  Description: Monitor and assess patient's nutrition/hydration status for malnutrition. Collaborate with interdisciplinary team and initiate plan and interventions as ordered.  Monitor patient's weight and dietary intake as ordered or per policy. Utilize nutrition screening tool and intervene as necessary. Determine patient's food preferences and provide high-protein, high-caloric foods as appropriate.     INTERVENTIONS:  - Monitor oral intake, urinary output, labs, and treatment plans  - Assess nutrition and hydration status and recommend course of action  - Evaluate amount of meals eaten  - Assist patient with eating if necessary   - Allow adequate time for meals  - Recommend/ encourage appropriate diets, oral nutritional supplements, and vitamin/mineral supplements  - Order, calculate, and assess calorie counts as needed  - Recommend, monitor, and adjust tube feedings and TPN/PPN based on assessed needs  - Assess need for intravenous fluids  - Provide specific nutrition/hydration education as appropriate  - Include patient/family/caregiver in decisions related to nutrition  Outcome: Progressing

## 2024-06-21 NOTE — ASSESSMENT & PLAN NOTE
POA, with history of microscopic colitis diagnosed recently with 3-5 days of cramping, diarrhea, and poor oral intake. States she started bioma probiotic and is unsure if this is may have contributed to acute illness  CT scan with pancolitis and CRP elevated   No fevers   Bacterial stool PCR negative   Giardia negative    Calprotectin pending   C.diff pending   Continue IVF  Supportive care  Patient's diet was increased last night, however unable to tolerate solid foods at this time.  Complaining of greater than 12 bowel movements overnight.  GI following, appreciate recs - see below   Antibiotics vs steroids pending cultures   Continue Bentyl for abdominal spasms  Started on Questran  Advance diet as tolerated

## 2024-06-21 NOTE — PROGRESS NOTES
Progress Note- Xenia Jones 54 y.o. female MRN: [id number]    Unit/Bed#: S -01 Encounter: [id number]      Assessment and Plan:    55 y/o F with hx of lymphocytic colitis first diagnosed in 2020 s/p course of Budesonide with good effect, doing well until she developed recurrent diarrheal symptoms 8 months ago prompting recent outpatient GI workup including stool studies & Colonoscopy 5/30/24 which showed normal TI & colonic mucosa but random colon biopsy was again consistent with lymphocytic colitis treated with Lialda, now presents with acute worsening of symptoms Saturday with diarrhea every 1/2 hour-hour w/ new onset abdominal cramping, chills, & nausea.    #1 Acute on chronic diarrhea  #2 Pancolitis on CT  #3 Lymphocytic colitis  CT abdomen/pelvis on admission notable for infectious or inflammatory pancolitis. She remains afebrile w/o leukocytosis, CRP elevated 22.7 from 4.6 in 2020. Symptoms improved yesterday, however worsened after diet advancement with 12 bowel movements overnight and increased abdominal cramping with associated nausea.  She remains afebrile without leukocytosis.  Potassium 3.4 this morning. Suspect symptoms multifactorial d/t infectious colitis/lymphocytic colitis.     -Giardia, enteric stool panel, C.diff negative.      -Will downgrade diet back to a liquid, low residue, lactose restricted diet with toast and crackers  -Increased Bentyl to 20 mg 4 times daily as needed for cramping    -Start IV Ceftriaxone and IV Flagyl for pancolitis given lack of improvement in symptoms    -Discontinued mesalamine for now as patient reports she had no symptomatic improvement in her diarrhea after he was initiated, now with acute worsening. Consider switching to Budesonide for treatment of lymphocytic colitis pending clinical course     -Continue supportive care with IV hydration, repletion of electrolytes as  "needed        ______________________________________________________________________    Subjective:     Patient reports worsening diarrhea overnight after diet was advanced for dinner and mesalamine restarted.  She had 12 non-bloody bowel movements overnight with significant abdominal cramping/spasms with associated nausea.     Medication Administration - last 24 hours from 06/20/2024 0920 to 06/21/2024 0920         Date/Time Order Dose Route Action Action by     06/20/2024 2054 EDT acetaminophen (TYLENOL) tablet 650 mg 650 mg Oral Given Shawn Cristina RN     06/21/2024 0146 EDT ondansetron (ZOFRAN) injection 4 mg 4 mg Intravenous Not Given Sunday JENNY Casanova     06/21/2024 0847 EDT enoxaparin (LOVENOX) subcutaneous injection 40 mg 40 mg Subcutaneous Given Garrett Medina RN     06/20/2024 1701 EDT multi-electrolyte (PLASMALYTE-A/ISOLYTE-S PH 7.4) IV solution 125 mL/hr Intravenous New Bag Miladys Ruggiero RN     06/21/2024 0720 EDT dicyclomine (BENTYL) capsule 10 mg 10 mg Oral Given Kevinalbin Casanova RN     06/20/2024 1639 EDT dicyclomine (BENTYL) capsule 10 mg 10 mg Oral Given Miladys Ruggiero RN     06/20/2024 1218 EDT dicyclomine (BENTYL) capsule 10 mg 10 mg Oral Given Miladys Ruggiero RN     06/21/2024 0847 EDT mesalamine (DELZICOL) delayed release capsule 800 mg 800 mg Oral Given Garrett Medina RN     06/20/2024 2054 EDT mesalamine (DELZICOL) delayed release capsule 800 mg 800 mg Oral Given Shawn Cristina RN     06/20/2024 1639 EDT mesalamine (DELZICOL) delayed release capsule 800 mg 800 mg Oral Given Miladys Ruggiero RN     06/20/2024 1218 EDT mesalamine (DELZICOL) delayed release capsule 800 mg 800 mg Oral Given Miladys Ruggiero RN     06/21/2024 0852 EDT potassium chloride (Klor-Con M20) CR tablet 20 mEq 20 mEq Oral Given Garrett Medina RN            Objective:     Vitals: Blood pressure 149/88, pulse 62, temperature 98.6 °F (37 °C), temperature source Oral, resp. rate 18, height 5' 7\" (1.702 m), weight 110 kg (241 lb 10 " oz), last menstrual period 12/27/2020, SpO2 95%, not currently breastfeeding.,Body mass index is 37.84 kg/m².      Intake/Output Summary (Last 24 hours) at 6/21/2024 0920  Last data filed at 6/20/2024 1759  Gross per 24 hour   Intake 1974.58 ml   Output --   Net 1974.58 ml       Physical Exam:   General Appearance: Awake and alert, in no acute distress  Abdomen: Soft, non-tender, non-distended; bowel sounds normal; no masses or no organomegaly    Invasive Devices       Peripheral Intravenous Line  Duration             Peripheral IV 06/19/24 Left Antecubital 1 day                    Lab Results:  Admission on 06/19/2024   Component Date Value    Ventricular Rate 06/19/2024 82     Atrial Rate 06/19/2024 82     OR Interval 06/19/2024 156     QRSD Interval 06/19/2024 84     QT Interval 06/19/2024 380     QTC Interval 06/19/2024 443     P Axis 06/19/2024 70     QRS Axis 06/19/2024 89     T Wave Axis 06/19/2024 75     WBC 06/19/2024 6.71     RBC 06/19/2024 5.52 (H)     Hemoglobin 06/19/2024 16.2 (H)     Hematocrit 06/19/2024 47.3 (H)     MCV 06/19/2024 86     MCH 06/19/2024 29.3     MCHC 06/19/2024 34.2     RDW 06/19/2024 12.6     MPV 06/19/2024 10.7     Platelets 06/19/2024 350     nRBC 06/19/2024 0     Segmented % 06/19/2024 70     Immature Grans % 06/19/2024 0     Lymphocytes % 06/19/2024 15     Monocytes % 06/19/2024 12     Eosinophils Relative 06/19/2024 3     Basophils Relative 06/19/2024 0     Absolute Neutrophils 06/19/2024 4.64     Absolute Immature Grans 06/19/2024 0.02     Absolute Lymphocytes 06/19/2024 1.02     Absolute Monocytes 06/19/2024 0.81     Eosinophils Absolute 06/19/2024 0.20     Basophils Absolute 06/19/2024 0.02     Sodium 06/19/2024 136     Potassium 06/19/2024 3.5     Chloride 06/19/2024 105     CO2 06/19/2024 21     ANION GAP 06/19/2024 10     BUN 06/19/2024 13     Creatinine 06/19/2024 0.94     Glucose 06/19/2024 138     Calcium 06/19/2024 10.0     AST 06/19/2024 19     ALT 06/19/2024 27      Alkaline Phosphatase 06/19/2024 97     Total Protein 06/19/2024 7.2     Albumin 06/19/2024 4.4     Total Bilirubin 06/19/2024 0.91     eGFR 06/19/2024 69     Lipase 06/19/2024 12     CRP 06/19/2024 22.7 (H)     LACTIC ACID 06/19/2024 1.8     hs TnI 0hr 06/19/2024 <2     Salmonella sp PCR 06/19/2024 Negative     Shigella sp/Enteroinvasi* 06/19/2024 Negative     Campylobacter sp (jejuni* 06/19/2024 Negative     Shiga toxin 1/Shiga toxi* 06/19/2024 Negative     Color, UA 06/19/2024 Light Yellow     Clarity, UA 06/19/2024 Clear     Specific Gravity, UA 06/19/2024 >=1.050 (H)     pH, UA 06/19/2024 6.0     Leukocytes, UA 06/19/2024 Negative     Nitrite, UA 06/19/2024 Negative     Protein, UA 06/19/2024 50 (1+) (A)     Glucose, UA 06/19/2024 Negative     Ketones, UA 06/19/2024 20 (1+) (A)     Urobilinogen, UA 06/19/2024 <2.0     Bilirubin, UA 06/19/2024 Negative     Occult Blood, UA 06/19/2024 Negative     TSH 3RD GENERATON 06/19/2024 2.794     Giardia Ag, Stl 06/19/2024 Negative     RBC, UA 06/19/2024 1-2     WBC, UA 06/19/2024 None Seen     Epithelial Cells 06/19/2024 Occasional     Bacteria, UA 06/19/2024 None Seen     MUCUS THREADS 06/19/2024 Occasional (A)     WBC 06/20/2024 3.63 (L)     RBC 06/20/2024 4.46     Hemoglobin 06/20/2024 13.3     Hematocrit 06/20/2024 39.1     MCV 06/20/2024 88     MCH 06/20/2024 29.1     MCHC 06/20/2024 33.2     RDW 06/20/2024 12.5     Platelets 06/20/2024 257     MPV 06/20/2024 10.4     Sodium 06/20/2024 140     Potassium 06/20/2024 3.6     Chloride 06/20/2024 111 (H)     CO2 06/20/2024 24     ANION GAP 06/20/2024 5     BUN 06/20/2024 11     Creatinine 06/20/2024 0.76     Glucose 06/20/2024 89     Calcium 06/20/2024 8.8     eGFR 06/20/2024 89     WBC 06/21/2024 4.66     RBC 06/21/2024 4.46     Hemoglobin 06/21/2024 13.0     Hematocrit 06/21/2024 38.5     MCV 06/21/2024 86     MCH 06/21/2024 29.1     MCHC 06/21/2024 33.8     RDW 06/21/2024 12.4     Platelets 06/21/2024 260     MPV  06/21/2024 10.3     Sodium 06/21/2024 141     Potassium 06/21/2024 3.4 (L)     Chloride 06/21/2024 109 (H)     CO2 06/21/2024 27     ANION GAP 06/21/2024 5     BUN 06/21/2024 6     Creatinine 06/21/2024 0.75     Glucose 06/21/2024 96     Calcium 06/21/2024 8.8     eGFR 06/21/2024 91        Imaging Studies: I have personally reviewed pertinent imaging studies.

## 2024-06-21 NOTE — PROGRESS NOTES
[facility]  Progress Note  Name: Xenia Jones I  MRN: [id number]  Unit/Bed#: S -Neftali I Date of Admission: 6/19/2024   Date of Service: 6/21/2024 I Hospital Day: 2    Assessment & Plan   * Pancolitis (HCC)  Assessment & Plan  POA, with history of microscopic colitis diagnosed recently with 3-5 days of cramping, diarrhea, and poor oral intake. States she started bioma probiotic and is unsure if this is may have contributed to acute illness  CT scan with pancolitis and CRP elevated   No fevers   Bacterial stool PCR negative   Giardia negative    Calprotectin pending   C.diff pending   Continue IVF  Supportive care  Patient's diet was increased last night, however unable to tolerate solid foods at this time.  Complaining of greater than 12 bowel movements overnight.  GI following, appreciate recs - see below   Antibiotics vs steroids pending cultures   Continue Bentyl for abdominal spasms  Started on Questran  Advance diet as tolerated    Hashimoto's thyroiditis  Assessment & Plan  Reported by patient, not on treatment currently   Follow up outpatient     Depression  Assessment & Plan  Continue Lexapro 20 mg daily         VTE Pharmacologic Prophylaxis: VTE Score: 3 Moderate Risk (Score 3-4) - Pharmacological DVT Prophylaxis Ordered: enoxaparin (Lovenox).    Mobility:   Basic Mobility Inpatient Raw Score: 24  JH-HLM Goal: 8: Walk 250 feet or more  JH-HLM Achieved: 7: Walk 25 feet or more  JH-HLM Goal NOT achieved. Continue with multidisciplinary rounding and encourage appropriate mobility to improve upon JH-HLM goals.    Patient Centered Rounds: I performed bedside rounds with nursing staff today.   Discussions with Specialists or Other Care Team Provider: GI     Education and Discussions with Family / Patient: Patient declined call to .     Total Time Spent on Date of Encounter in care of patient: This time was spent on one or more of the following: performing physical  exam; counseling and coordination of care; obtaining or reviewing history; documenting in the medical record; reviewing/ordering tests, medications or procedures; communicating with other healthcare professionals and discussing with patient's family/caregivers.    Current Length of Stay: 2 day(s)  Current Patient Status: Inpatient   Certification Statement: The patient will continue to require additional inpatient hospital stay due to cultures pending, inability to tolerate PO  Discharge Plan: Anticipate discharge tomorrow to home.    Code Status: Level 1 - Full Code    Subjective:   Seen and examined.  Frustrated because overnight patient was given solid diet.  She states that she was in the bathroom over 12 times with bowel movements throughout the night.  States she feels hungry, just not tolerating it.  Still having abdominal pain/cramping.  Denies blood in the stool.    Objective:     Vitals:   Temp (24hrs), Av.1 °F (36.7 °C), Min:97.7 °F (36.5 °C), Max:98.6 °F (37 °C)    Temp:  [97.7 °F (36.5 °C)-98.6 °F (37 °C)] 98.6 °F (37 °C)  HR:  [55-65] 62  Resp:  [18-20] 18  BP: (124-153)/(77-88) 149/88  SpO2:  [95 %-98 %] 95 %  Body mass index is 37.84 kg/m².     Input and Output Summary (last 24 hours):     Intake/Output Summary (Last 24 hours) at 2024 1155  Last data filed at 2024 1759  Gross per 24 hour   Intake 1974.58 ml   Output --   Net 1974.58 ml       Physical Exam:   Physical Exam  Constitutional:       General: She is not in acute distress.  HENT:      Mouth/Throat:      Mouth: Mucous membranes are moist.   Eyes:      Conjunctiva/sclera: Conjunctivae normal.   Cardiovascular:      Rate and Rhythm: Normal rate.   Pulmonary:      Effort: Pulmonary effort is normal.   Abdominal:      Palpations: Abdomen is soft.      Tenderness: There is abdominal tenderness.   Musculoskeletal:         General: Normal range of motion.   Skin:     General: Skin is warm and dry.   Neurological:      Mental Status:  Mental status is at baseline.          Additional Data:     Labs:  Results from last 7 days   Lab Units 06/21/24  0551 06/20/24  0600 06/19/24  0937   WBC Thousand/uL 4.66   < > 6.71   HEMOGLOBIN g/dL 13.0   < > 16.2*   HEMATOCRIT % 38.5   < > 47.3*   PLATELETS Thousands/uL 260   < > 350   SEGS PCT %  --   --  70   LYMPHO PCT %  --   --  15   MONO PCT %  --   --  12   EOS PCT %  --   --  3    < > = values in this interval not displayed.     Results from last 7 days   Lab Units 06/21/24  0551 06/20/24  0600 06/19/24  0937   SODIUM mmol/L 141   < > 136   POTASSIUM mmol/L 3.4*   < > 3.5   CHLORIDE mmol/L 109*   < > 105   CO2 mmol/L 27   < > 21   BUN mg/dL 6   < > 13   CREATININE mg/dL 0.75   < > 0.94   ANION GAP mmol/L 5   < > 10   CALCIUM mg/dL 8.8   < > 10.0   ALBUMIN g/dL  --   --  4.4   TOTAL BILIRUBIN mg/dL  --   --  0.91   ALK PHOS U/L  --   --  97   ALT U/L  --   --  27   AST U/L  --   --  19   GLUCOSE RANDOM mg/dL 96   < > 138    < > = values in this interval not displayed.                 Results from last 7 days   Lab Units 06/19/24  0957   LACTIC ACID mmol/L 1.8       Lines/Drains:  Invasive Devices       Peripheral Intravenous Line  Duration             Peripheral IV 06/19/24 Left Antecubital 2 days                          Imaging: No pertinent imaging reviewed.    Recent Cultures (last 7 days):         Last 24 Hours Medication List:   Current Facility-Administered Medications   Medication Dose Route Frequency Provider Last Rate    acetaminophen  650 mg Oral Q6H PRN Augie Allan PA-C      cholestyramine sugar free  4 g Oral BID DACIA Edwards      dicyclomine  20 mg Oral 4x Daily PRN DACIA Edwards      enoxaparin  40 mg Subcutaneous Daily Augie Allan PA-C      escitalopram  20 mg Oral Daily Angelina Ruelas PA-C      multi-electrolyte  125 mL/hr Intravenous Continuous Angelina Ruelas PA-C 125 mL/hr (06/21/24 1108)    ondansetron  4 mg Intravenous Q6H PRN Augie  [place] Allan PA-C          Today, Patient Was Seen By: Angelina Ruelas PA-C    **Please Note: This note may have been constructed using a voice recognition system.**

## 2024-06-22 PROBLEM — E87.6 HYPOKALEMIA: Status: ACTIVE | Noted: 2024-06-22

## 2024-06-22 LAB
ANION GAP SERPL CALCULATED.3IONS-SCNC: 6 MMOL/L (ref 4–13)
BUN SERPL-MCNC: 4 MG/DL (ref 5–25)
CALCIUM SERPL-MCNC: 8.9 MG/DL (ref 8.4–10.2)
CHLORIDE SERPL-SCNC: 106 MMOL/L (ref 96–108)
CO2 SERPL-SCNC: 28 MMOL/L (ref 21–32)
CREAT SERPL-MCNC: 0.74 MG/DL (ref 0.6–1.3)
ERYTHROCYTE [DISTWIDTH] IN BLOOD BY AUTOMATED COUNT: 12.5 % (ref 11.6–15.1)
GFR SERPL CREATININE-BSD FRML MDRD: 92 ML/MIN/1.73SQ M
GLUCOSE SERPL-MCNC: 91 MG/DL (ref 65–140)
HCT VFR BLD AUTO: 39.9 % (ref 34.8–46.1)
HGB BLD-MCNC: 13.5 G/DL (ref 11.5–15.4)
MCH RBC QN AUTO: 29.2 PG (ref 26.8–34.3)
MCHC RBC AUTO-ENTMCNC: 33.8 G/DL (ref 31.4–37.4)
MCV RBC AUTO: 86 FL (ref 82–98)
PLATELET # BLD AUTO: 271 THOUSANDS/UL (ref 149–390)
PMV BLD AUTO: 10.4 FL (ref 8.9–12.7)
POTASSIUM SERPL-SCNC: 3.2 MMOL/L (ref 3.5–5.3)
RBC # BLD AUTO: 4.63 MILLION/UL (ref 3.81–5.12)
SODIUM SERPL-SCNC: 140 MMOL/L (ref 135–147)
WBC # BLD AUTO: 3.73 THOUSAND/UL (ref 4.31–10.16)

## 2024-06-22 PROCEDURE — 80048 BASIC METABOLIC PNL TOTAL CA: CPT

## 2024-06-22 PROCEDURE — 85027 COMPLETE CBC AUTOMATED: CPT

## 2024-06-22 PROCEDURE — 99232 SBSQ HOSP IP/OBS MODERATE 35: CPT | Performed by: INTERNAL MEDICINE

## 2024-06-22 PROCEDURE — 99232 SBSQ HOSP IP/OBS MODERATE 35: CPT | Performed by: PHYSICIAN ASSISTANT

## 2024-06-22 RX ORDER — POTASSIUM CHLORIDE 20 MEQ/1
40 TABLET, EXTENDED RELEASE ORAL ONCE
Status: COMPLETED | OUTPATIENT
Start: 2024-06-22 | End: 2024-06-22

## 2024-06-22 RX ADMIN — ACETAMINOPHEN 650 MG: 325 TABLET, FILM COATED ORAL at 22:00

## 2024-06-22 RX ADMIN — ESCITALOPRAM OXALATE 20 MG: 20 TABLET ORAL at 08:51

## 2024-06-22 RX ADMIN — METRONIDAZOLE 500 MG: 500 INJECTION, SOLUTION INTRAVENOUS at 22:01

## 2024-06-22 RX ADMIN — METRONIDAZOLE 500 MG: 500 INJECTION, SOLUTION INTRAVENOUS at 14:38

## 2024-06-22 RX ADMIN — CEFTRIAXONE SODIUM 2000 MG: 10 INJECTION, POWDER, FOR SOLUTION INTRAVENOUS at 15:39

## 2024-06-22 RX ADMIN — METRONIDAZOLE 500 MG: 500 INJECTION, SOLUTION INTRAVENOUS at 06:58

## 2024-06-22 RX ADMIN — ACETAMINOPHEN 650 MG: 325 TABLET, FILM COATED ORAL at 17:13

## 2024-06-22 RX ADMIN — ENOXAPARIN SODIUM 40 MG: 40 INJECTION SUBCUTANEOUS at 08:51

## 2024-06-22 RX ADMIN — SODIUM CHLORIDE, SODIUM GLUCONATE, SODIUM ACETATE, POTASSIUM CHLORIDE, MAGNESIUM CHLORIDE, SODIUM PHOSPHATE, DIBASIC, AND POTASSIUM PHOSPHATE 125 ML/HR: .53; .5; .37; .037; .03; .012; .00082 INJECTION, SOLUTION INTRAVENOUS at 17:13

## 2024-06-22 RX ADMIN — POTASSIUM CHLORIDE 40 MEQ: 1500 TABLET, EXTENDED RELEASE ORAL at 08:51

## 2024-06-22 NOTE — PROGRESS NOTES
[facility]  Progress Note  Name: Xenia Jones I  MRN: [id number]  Unit/Bed#: S -Neftali I Date of Admission: 6/19/2024   Date of Service: 6/22/2024 I Hospital Day: 3    Assessment & Plan   * Pancolitis (HCC)  Assessment & Plan  Pt w/ PMH of recently dx lymphocytic colitis, w/ c/o 3-5 days of cramping, diarrhea, and poor PO intake. Now, 1 loose BM today; no abdominal pain; tolerated toast this a.m.  CT abd/pelvis: pancolitis, infx vs. Inflammatory   CRP elevated, 22.7 (from 4.6 in 2020)  Afebrile  Giardia, enteric stool panel, C.diff negative   Continue IVFs; supportive care   Bentyl 20 mg QID PRN   Prior mesalamine d/c'd; GI may consider Budesonide for future tx   Diet decreased on 06/21 2/2 increase of loose stool; will increase diet today as patient tolerated toast this a.m.  ABX day # 2, Rocephin & Flagyl  Supportive care  GI following; continued recommendations are appreciated    Hypokalemia  Assessment & Plan  Recent Labs     06/20/24  0600 06/21/24  0551 06/22/24  0602   K 3.6 3.4* 3.2*     Repleted  Check magnesium level  Trend    Hashimoto's thyroiditis  Assessment & Plan  H/o   TSH therapeutic on 06/19    Depression  Assessment & Plan  Continue Lexapro 20 mg daily  Mood pleasant and stable           VTE Pharmacologic Prophylaxis: VTE Score: 3 Moderate Risk (Score 3-4) - Pharmacological DVT Prophylaxis Ordered: enoxaparin (Lovenox).    Mobility:   Basic Mobility Inpatient Raw Score: 24  JH-HLM Goal: 8: Walk 250 feet or more  JH-HLM Achieved: 8: Walk 250 feet ot more  JH-HLM Goal achieved. Continue to encourage appropriate mobility.    Patient Centered Rounds: I performed bedside rounds with nursing staff today.   Discussions with Specialists or Other Care Team Provider: GI following    Education and Discussions with Family / Patient: Patient declined call to .  Patient will update family members that she sees fit.    Total Time Spent on Date of Encounter in care  of patient: 45 mins. This time was spent on one or more of the following: performing physical exam; counseling and coordination of care; obtaining or reviewing history; documenting in the medical record; reviewing/ordering tests, medications or procedures; communicating with other healthcare professionals and discussing with patient's family/caregivers.    Current Length of Stay: 3 day(s)  Current Patient Status: Inpatient   Certification Statement: The patient will continue to require additional inpatient hospital stay due to pancolitis  Discharge Plan: Anticipate discharge tomorrow to home.    Code Status: Level 1 - Full Code    Subjective:   Patient admits she is doing much better than yesterday.  She tolerated a piece of toast without gross abdominal pain.  She notes her previous bout of abdominal pain/cramping was yesterday evening.  Last bowel movement which was loose was this morning.  She denies nausea or vomiting.  She is without fever.  She is urinating regularly.    Objective:     Vitals:   Temp (24hrs), Av.3 °F (36.8 °C), Min:98.2 °F (36.8 °C), Max:98.4 °F (36.9 °C)    Temp:  [98.2 °F (36.8 °C)-98.4 °F (36.9 °C)] 98.2 °F (36.8 °C)  HR:  [60-62] 62  Resp:  [14] 14  BP: (122-139)/(69-76) 139/76  SpO2:  [93 %-96 %] 93 %  Body mass index is 37.84 kg/m².     Input and Output Summary (last 24 hours):     Intake/Output Summary (Last 24 hours) at 2024 0934  Last data filed at 2024 1810  Gross per 24 hour   Intake 1200 ml   Output --   Net 1200 ml       Physical Exam:   Physical Exam  Constitutional:       Comments: Overweight; pleasant   HENT:      Head: Normocephalic.      Right Ear: External ear normal.      Left Ear: External ear normal.      Nose: Nose normal.      Mouth/Throat:      Mouth: Mucous membranes are moist.      Pharynx: Oropharynx is clear. No oropharyngeal exudate.   Eyes:      Extraocular Movements: Extraocular movements intact.      Conjunctiva/sclera: Conjunctivae normal.    Cardiovascular:      Rate and Rhythm: Normal rate and regular rhythm.      Pulses: Normal pulses.      Heart sounds: Normal heart sounds.   Pulmonary:      Effort: Pulmonary effort is normal.      Breath sounds: Normal breath sounds.   Abdominal:      General: Abdomen is flat. Bowel sounds are normal. There is no distension.      Palpations: Abdomen is soft.      Tenderness: There is abdominal tenderness (Mild tenderness left lower quadrant).   Musculoskeletal:         General: Normal range of motion.      Right lower leg: No edema.      Left lower leg: No edema.   Skin:     General: Skin is warm and dry.   Neurological:      General: No focal deficit present.      Mental Status: She is alert. Mental status is at baseline.   Psychiatric:         Mood and Affect: Mood normal.         Behavior: Behavior normal.          Additional Data:     Labs:  Results from last 7 days   Lab Units 06/22/24 0602 06/20/24 0600 06/19/24  0937   WBC Thousand/uL 3.73*   < > 6.71   HEMOGLOBIN g/dL 13.5   < > 16.2*   HEMATOCRIT % 39.9   < > 47.3*   PLATELETS Thousands/uL 271   < > 350   SEGS PCT %  --   --  70   LYMPHO PCT %  --   --  15   MONO PCT %  --   --  12   EOS PCT %  --   --  3    < > = values in this interval not displayed.     Results from last 7 days   Lab Units 06/22/24 0602 06/20/24 0600 06/19/24  0937   SODIUM mmol/L 140   < > 136   POTASSIUM mmol/L 3.2*   < > 3.5   CHLORIDE mmol/L 106   < > 105   CO2 mmol/L 28   < > 21   BUN mg/dL 4*   < > 13   CREATININE mg/dL 0.74   < > 0.94   ANION GAP mmol/L 6   < > 10   CALCIUM mg/dL 8.9   < > 10.0   ALBUMIN g/dL  --   --  4.4   TOTAL BILIRUBIN mg/dL  --   --  0.91   ALK PHOS U/L  --   --  97   ALT U/L  --   --  27   AST U/L  --   --  19   GLUCOSE RANDOM mg/dL 91   < > 138    < > = values in this interval not displayed.                 Results from last 7 days   Lab Units 06/19/24  0957   LACTIC ACID mmol/L 1.8       Lines/Drains:  Invasive Devices       Peripheral Intravenous  Line  Duration             Peripheral IV 06/22/24 Right Antecubital <1 day                          Imaging: Reviewed radiology reports from this admission including: abdominal/pelvic CT    Recent Cultures (last 7 days):   Results from last 7 days   Lab Units 06/20/24  0900   C DIFF TOXIN B BY PCR  Negative       Last 24 Hours Medication List:   Current Facility-Administered Medications   Medication Dose Route Frequency Provider Last Rate    acetaminophen  650 mg Oral Q6H PRN Augie Allan PA-C      cefTRIAXone  2,000 mg Intravenous Q24H JEREMI EdwardsNP 2,000 mg (06/21/24 1554)    dicyclomine  20 mg Oral 4x Daily PRN DACIA Edwards      enoxaparin  40 mg Subcutaneous Daily Augie Allan PA-C      escitalopram  20 mg Oral Daily Angelina Ruelas PA-C      metroNIDAZOLE  500 mg Intravenous Q8H DACIA Edwards 500 mg (06/22/24 0658)    multi-electrolyte  125 mL/hr Intravenous Continuous Angelina Ruelas PA-C 125 mL/hr (06/21/24 2341)    ondansetron  4 mg Intravenous Q6H PRN Augie Allan PA-C          Today, Patient Was Seen By: Sherry Bunn PA-C    **Please Note: This note may have been constructed using a voice recognition system.**

## 2024-06-22 NOTE — ASSESSMENT & PLAN NOTE
Pt w/ PMH of recently dx lymphocytic colitis, w/ c/o 3-5 days of cramping, diarrhea, and poor PO intake. Now, 1 loose BM today; no abdominal pain; tolerated toast this a.m.  CT abd/pelvis: pancolitis, infx vs. Inflammatory   CRP elevated, 22.7 (from 4.6 in 2020)  Afebrile  Giardia, enteric stool panel, C.diff negative   Continue IVFs; supportive care   Bentyl 20 mg QID PRN   Prior mesalamine d/c'd; GI may consider Budesonide for future tx   Diet decreased on 06/21 2/2 increase of loose stool; will increase diet today as patient tolerated toast this a.m.  ABX day # 2, Rocephin & Flagyl  Supportive care  GI following; continued recommendations are appreciated

## 2024-06-22 NOTE — ASSESSMENT & PLAN NOTE
Recent Labs     06/20/24  0600 06/21/24  0551 06/22/24  0602   K 3.6 3.4* 3.2*     Repleted  Check magnesium level  Trend

## 2024-06-22 NOTE — PLAN OF CARE
Problem: PAIN - ADULT  Goal: Verbalizes/displays adequate comfort level or baseline comfort level  Description: Interventions:  - Encourage patient to monitor pain and request assistance  - Assess pain using appropriate pain scale  - Administer analgesics based on type and severity of pain and evaluate response  - Implement non-pharmacological measures as appropriate and evaluate response  - Consider cultural and social influences on pain and pain management  - Notify physician/advanced practitioner if interventions unsuccessful or patient reports new pain  Outcome: Progressing     Problem: INFECTION - ADULT  Goal: Absence or prevention of progression during hospitalization  Description: INTERVENTIONS:  - Assess and monitor for signs and symptoms of infection  - Monitor lab/diagnostic results  - Monitor all insertion sites, i.e. indwelling lines, tubes, and drains  - Monitor endotracheal if appropriate and nasal secretions for changes in amount and color  - [place] appropriate cooling/warming therapies per order  - Administer medications as ordered  - Instruct and encourage patient and family to use good hand hygiene technique  - Identify and instruct in appropriate isolation precautions for identified infection/condition  Outcome: Progressing  Goal: Absence of fever/infection during neutropenic period  Description: INTERVENTIONS:  - Monitor WBC    Outcome: Progressing     Problem: SAFETY ADULT  Goal: Patient will remain free of falls  Description: INTERVENTIONS:  - Educate patient/family on patient safety including physical limitations  - Instruct patient to call for assistance with activity   - Consult OT/PT to assist with strengthening/mobility   - Keep Call bell within reach  - Keep bed low and locked with side rails adjusted as appropriate  - Keep care items and personal belongings within reach  - Initiate and maintain comfort rounds  - Make Fall Risk Sign visible to staff  - Offer Toileting every 2 Hours,  in advance of need  - Apply yellow socks and bracelet for high fall risk patients  - Consider moving patient to room near nurses station  Outcome: Progressing  Goal: Maintain or return to baseline ADL function  Description: INTERVENTIONS:  -  Assess patient's ability to carry out ADLs; assess patient's baseline for ADL function and identify physical deficits which impact ability to perform ADLs (bathing, care of mouth/teeth, toileting, grooming, dressing, etc.)  - Assess/evaluate cause of self-care deficits   - Assess range of motion  - Assess patient's mobility; develop plan if impaired  - Assess patient's need for assistive devices and provide as appropriate  - Encourage maximum independence but intervene and supervise when necessary  - Involve family in performance of ADLs  - Assess for home care needs following discharge   - Consider OT consult to assist with ADL evaluation and planning for discharge  - Provide patient education as appropriate  Outcome: Progressing  Goal: Maintains/Returns to pre admission functional level  Description: INTERVENTIONS:  - Perform AM-PAC 6 Click Basic Mobility/ Daily Activity assessment daily.  - Set and communicate daily mobility goal to care team and patient/family/caregiver.   - Collaborate with rehabilitation services on mobility goals if consulted  - Perform Range of Motion 3 times a day.  - Reposition patient every 2 hours.  - Dangle patient 3 times a day  - Stand patient 3 times a day  - Ambulate patient 3 times a day  - Out of bed to chair 3 times a day   - Out of bed for meals 3 times a day  - Out of bed for toileting  - Record patient progress and toleration of activity level   Outcome: Progressing     Problem: Knowledge Deficit  Goal: Patient/family/caregiver demonstrates understanding of disease process, treatment plan, medications, and discharge instructions  Description: Complete learning assessment and assess knowledge base.  Interventions:  - Provide teaching at  level of understanding  - Provide teaching via preferred learning methods  Outcome: Progressing

## 2024-06-22 NOTE — PROGRESS NOTES
Progress Note - Xenia Jones 54 y.o. female MRN: [id number]    Unit/Bed#: S -01 Encounter: [id number]        Assessment/Plan:  55 y/o F with hx of lymphocytic colitis first diagnosed in 2020 s/p course of Budesonide with good effect, doing well until she developed recurrent diarrheal symptoms 8 months ago prompting recent outpatient GI workup including stool studies & Colonoscopy 5/30/24 which showed normal TI & colonic mucosa but random colon biopsy was again consistent with lymphocytic colitis treated with Lialda, now presents with acute worsening of symptoms this past Saturday with diarrhea every 1/2 hour-hour w/ new onset abdominal cramping, chills, & nausea.     #1 Acute on chronic diarrhea  #2 Pancolitis on CT  #3 Lymphocytic colitis  CT abdomen/pelvis on admission notable for infectious or inflammatory pancolitis. She remains afebrile w/o leukocytosis, CRP elevated 22.7 from 4.6 in 2020.Suspect symptoms multifactorial d/t infectious colitis/lymphocytic colitis.     -Giardia, enteric stool panel, C.diff negative.      -Continue low residue, lactose restricted diet  -Increased Bentyl to 20 mg 4 times daily as needed for cramping     -Started IV Ceftriaxone and IV Flagyl for pancolitis given lack of improvement in symptoms, should complete total of 5 day course     -Discontinued mesalamine for now as patient reports she had no symptomatic improvement in her diarrhea after he was initiated, now with acute worsening. Consider switching to Budesonide for treatment of lymphocytic colitis pending clinical course, follow with Dr Herrera as outpt     -Continue supportive care with IV hydration, repletion of electrolytes as needed    Subjective:   Patient reports she had 2 bowel movements today.  She states that she is feeling well, less cramping and she states that she may have loose stools just from the antibiotic.  She otherwise reports that she ate toast this morning and has no significant diarrhea after the  "fact.    Objective:     Vitals: /76 (BP Location: Left arm)   Pulse 62   Temp 98.2 °F (36.8 °C) (Oral)   Resp 14   Ht 5' 7\" (1.702 m)   Wt 110 kg (241 lb 10 oz)   LMP 12/27/2020 (Exact Date)   SpO2 93%   BMI 37.84 kg/m²       Physical Exam:  Gen-alert acute distress  Abd-soft positive bowel sounds nontender nondistended       Lab, Imaging and other studies:   Recent Results (from the past 72 hour(s))   Stool Enteric Bacterial Panel by PCR    Collection Time: 06/19/24 12:53 PM    Specimen: Rectum; Stool   Result Value Ref Range    Salmonella sp PCR Negative Negative    Shigella sp/Enteroinvasive E. coli (EIEC) PCR Negative Negative    Campylobacter sp (jejuni and coli) PCR Negative Negative    Shiga toxin 1/Shiga toxin 2 genes PCR Negative Negative   Giardia antigen    Collection Time: 06/19/24 12:53 PM    Specimen: Per Rectum; Stool   Result Value Ref Range    Giardia Ag, Stl Negative Negative   UA (URINE) with reflex to Scope    Collection Time: 06/19/24  1:36 PM   Result Value Ref Range    Color, UA Light Yellow     Clarity, UA Clear     Specific Gravity, UA >=1.050 (H) 1.003 - 1.030    pH, UA 6.0 4.5, 5.0, 5.5, 6.0, 6.5, 7.0, 7.5, 8.0    Leukocytes, UA Negative Negative    Nitrite, UA Negative Negative    Protein, UA 50 (1+) (A) Negative mg/dl    Glucose, UA Negative Negative mg/dl    Ketones, UA 20 (1+) (A) Negative mg/dl    Urobilinogen, UA <2.0 <2.0 mg/dl mg/dl    Bilirubin, UA Negative Negative    Occult Blood, UA Negative Negative   Urine Microscopic    Collection Time: 06/19/24  1:36 PM   Result Value Ref Range    RBC, UA 1-2 None Seen, 1-2 /hpf    WBC, UA None Seen None Seen, 1-2 /hpf    Epithelial Cells Occasional None Seen, Occasional /hpf    Bacteria, UA None Seen None Seen, Occasional /hpf    MUCUS THREADS Occasional (A) None Seen   CBC (With Platelets)    Collection Time: 06/20/24  6:00 AM   Result Value Ref Range    WBC 3.63 (L) 4.31 - 10.16 Thousand/uL    RBC 4.46 3.81 - 5.12 " Million/uL    Hemoglobin 13.3 11.5 - 15.4 g/dL    Hematocrit 39.1 34.8 - 46.1 %    MCV 88 82 - 98 fL    MCH 29.1 26.8 - 34.3 pg    MCHC 33.2 31.4 - 37.4 g/dL    RDW 12.5 11.6 - 15.1 %    Platelets 257 149 - 390 Thousands/uL    MPV 10.4 8.9 - 12.7 fL   Basic metabolic panel    Collection Time: 06/20/24  6:00 AM   Result Value Ref Range    Sodium 140 135 - 147 mmol/L    Potassium 3.6 3.5 - 5.3 mmol/L    Chloride 111 (H) 96 - 108 mmol/L    CO2 24 21 - 32 mmol/L    ANION GAP 5 4 - 13 mmol/L    BUN 11 5 - 25 mg/dL    Creatinine 0.76 0.60 - 1.30 mg/dL    Glucose 89 65 - 140 mg/dL    Calcium 8.8 8.4 - 10.2 mg/dL    eGFR 89 ml/min/1.73sq m   Clostridium difficile toxin by PCR with EIA    Collection Time: 06/20/24  9:00 AM    Specimen: Rectum; Stool   Result Value Ref Range    C.difficile toxin by PCR Negative Negative   CBC    Collection Time: 06/21/24  5:51 AM   Result Value Ref Range    WBC 4.66 4.31 - 10.16 Thousand/uL    RBC 4.46 3.81 - 5.12 Million/uL    Hemoglobin 13.0 11.5 - 15.4 g/dL    Hematocrit 38.5 34.8 - 46.1 %    MCV 86 82 - 98 fL    MCH 29.1 26.8 - 34.3 pg    MCHC 33.8 31.4 - 37.4 g/dL    RDW 12.4 11.6 - 15.1 %    Platelets 260 149 - 390 Thousands/uL    MPV 10.3 8.9 - 12.7 fL   Basic metabolic panel    Collection Time: 06/21/24  5:51 AM   Result Value Ref Range    Sodium 141 135 - 147 mmol/L    Potassium 3.4 (L) 3.5 - 5.3 mmol/L    Chloride 109 (H) 96 - 108 mmol/L    CO2 27 21 - 32 mmol/L    ANION GAP 5 4 - 13 mmol/L    BUN 6 5 - 25 mg/dL    Creatinine 0.75 0.60 - 1.30 mg/dL    Glucose 96 65 - 140 mg/dL    Calcium 8.8 8.4 - 10.2 mg/dL    eGFR 91 ml/min/1.73sq m   CBC    Collection Time: 06/22/24  6:02 AM   Result Value Ref Range    WBC 3.73 (L) 4.31 - 10.16 Thousand/uL    RBC 4.63 3.81 - 5.12 Million/uL    Hemoglobin 13.5 11.5 - 15.4 g/dL    Hematocrit 39.9 34.8 - 46.1 %    MCV 86 82 - 98 fL    MCH 29.2 26.8 - 34.3 pg    MCHC 33.8 31.4 - 37.4 g/dL    RDW 12.5 11.6 - 15.1 %    Platelets 271 149 - 390  Thousands/uL    MPV 10.4 8.9 - 12.7 fL   Basic metabolic panel    Collection Time: 06/22/24  6:02 AM   Result Value Ref Range    Sodium 140 135 - 147 mmol/L    Potassium 3.2 (L) 3.5 - 5.3 mmol/L    Chloride 106 96 - 108 mmol/L    CO2 28 21 - 32 mmol/L    ANION GAP 6 4 - 13 mmol/L    BUN 4 (L) 5 - 25 mg/dL    Creatinine 0.74 0.60 - 1.30 mg/dL    Glucose 91 65 - 140 mg/dL    Calcium 8.9 8.4 - 10.2 mg/dL    eGFR 92 ml/min/1.73sq m

## 2024-06-23 VITALS
SYSTOLIC BLOOD PRESSURE: 131 MMHG | OXYGEN SATURATION: 95 % | HEART RATE: 60 BPM | RESPIRATION RATE: 16 BRPM | TEMPERATURE: 97.5 F | DIASTOLIC BLOOD PRESSURE: 92 MMHG | BODY MASS INDEX: 37.92 KG/M2 | WEIGHT: 241.62 LBS | HEIGHT: 67 IN

## 2024-06-23 LAB
ANION GAP SERPL CALCULATED.3IONS-SCNC: 4 MMOL/L (ref 4–13)
BUN SERPL-MCNC: 3 MG/DL (ref 5–25)
CALCIUM SERPL-MCNC: 8.8 MG/DL (ref 8.4–10.2)
CALPROTECTIN STL-MCNT: 2720 UG/G (ref 0–120)
CHLORIDE SERPL-SCNC: 107 MMOL/L (ref 96–108)
CO2 SERPL-SCNC: 29 MMOL/L (ref 21–32)
CREAT SERPL-MCNC: 0.72 MG/DL (ref 0.6–1.3)
ERYTHROCYTE [DISTWIDTH] IN BLOOD BY AUTOMATED COUNT: 12.4 % (ref 11.6–15.1)
GFR SERPL CREATININE-BSD FRML MDRD: 95 ML/MIN/1.73SQ M
GLUCOSE SERPL-MCNC: 90 MG/DL (ref 65–140)
HCT VFR BLD AUTO: 37.2 % (ref 34.8–46.1)
HGB BLD-MCNC: 12.7 G/DL (ref 11.5–15.4)
MAGNESIUM SERPL-MCNC: 2.1 MG/DL (ref 1.9–2.7)
MCH RBC QN AUTO: 29.3 PG (ref 26.8–34.3)
MCHC RBC AUTO-ENTMCNC: 34.1 G/DL (ref 31.4–37.4)
MCV RBC AUTO: 86 FL (ref 82–98)
PLATELET # BLD AUTO: 259 THOUSANDS/UL (ref 149–390)
PMV BLD AUTO: 10.7 FL (ref 8.9–12.7)
POTASSIUM SERPL-SCNC: 3.4 MMOL/L (ref 3.5–5.3)
RBC # BLD AUTO: 4.33 MILLION/UL (ref 3.81–5.12)
SODIUM SERPL-SCNC: 140 MMOL/L (ref 135–147)
WBC # BLD AUTO: 3.93 THOUSAND/UL (ref 4.31–10.16)

## 2024-06-23 PROCEDURE — 99239 HOSP IP/OBS DSCHRG MGMT >30: CPT | Performed by: PHYSICIAN ASSISTANT

## 2024-06-23 PROCEDURE — 80048 BASIC METABOLIC PNL TOTAL CA: CPT | Performed by: PHYSICIAN ASSISTANT

## 2024-06-23 PROCEDURE — 83735 ASSAY OF MAGNESIUM: CPT | Performed by: PHYSICIAN ASSISTANT

## 2024-06-23 PROCEDURE — 85027 COMPLETE CBC AUTOMATED: CPT | Performed by: PHYSICIAN ASSISTANT

## 2024-06-23 RX ORDER — DICYCLOMINE HYDROCHLORIDE 10 MG/1
20 CAPSULE ORAL 4 TIMES DAILY PRN
Qty: 30 CAPSULE | Refills: 0 | Status: SHIPPED | OUTPATIENT
Start: 2024-06-23

## 2024-06-23 RX ORDER — METRONIDAZOLE 500 MG/1
500 TABLET ORAL EVERY 8 HOURS SCHEDULED
Qty: 9 TABLET | Refills: 0 | Status: SHIPPED | OUTPATIENT
Start: 2024-06-23 | End: 2024-06-26

## 2024-06-23 RX ORDER — CEPHALEXIN 500 MG/1
500 CAPSULE ORAL EVERY 6 HOURS SCHEDULED
Qty: 12 CAPSULE | Refills: 0 | Status: SHIPPED | OUTPATIENT
Start: 2024-06-23 | End: 2024-06-26

## 2024-06-23 RX ORDER — POTASSIUM CHLORIDE 20 MEQ/1
40 TABLET, EXTENDED RELEASE ORAL ONCE
Status: COMPLETED | OUTPATIENT
Start: 2024-06-23 | End: 2024-06-23

## 2024-06-23 RX ADMIN — METRONIDAZOLE 500 MG: 500 INJECTION, SOLUTION INTRAVENOUS at 06:29

## 2024-06-23 RX ADMIN — POTASSIUM CHLORIDE 40 MEQ: 1500 TABLET, EXTENDED RELEASE ORAL at 08:29

## 2024-06-23 RX ADMIN — SODIUM CHLORIDE, SODIUM GLUCONATE, SODIUM ACETATE, POTASSIUM CHLORIDE, MAGNESIUM CHLORIDE, SODIUM PHOSPHATE, DIBASIC, AND POTASSIUM PHOSPHATE 125 ML/HR: .53; .5; .37; .037; .03; .012; .00082 INJECTION, SOLUTION INTRAVENOUS at 03:49

## 2024-06-23 RX ADMIN — ESCITALOPRAM OXALATE 20 MG: 20 TABLET ORAL at 08:29

## 2024-06-23 NOTE — ASSESSMENT & PLAN NOTE
Recent Labs     06/21/24  0551 06/22/24  0602 06/23/24  0451   K 3.4* 3.2* 3.4*     Repleted  Check magnesium level  Trend

## 2024-06-23 NOTE — DISCHARGE INSTR - AVS FIRST PAGE
Please return to the emergency department with any concerning symptoms.  Please continue to maintain adequate hydration as an outpatient.    We would highly recommend repeat blood work within 1 week of discharge to monitor your blood counts, electrolytes (specifically your potassium level), and kidney function.     Please maintain a bland diet at discharge.

## 2024-06-23 NOTE — DISCHARGE SUMMARY
Critical access hospital  Discharge- Xenia MILNER Karen 1970, 54 y.o. female MRN: [id number]  Unit/Bed#: S -01 Encounter: [id number]  Primary Care Provider: Cornelia Farooq DO   Date and time admitted to hospital: 6/19/2024  9:19 AM    * Pancolitis (HCC)  Assessment & Plan  Pt w/ PMH of recently dx lymphocytic colitis, w/ c/o 3-5 days of cramping, diarrhea, and poor PO intake.   CT abd/pelvis: Mild uncomplicated infectious or inflammatory pancolitis. No free air or absces   CRP elevated, 22.7 (from 4.6 in 2020)  Afebrile  Giardia, enteric stool panel, C.diff negative   S/p IVFs; supportive care   Bentyl 20 mg QID PRN   Prior mesalamine d/c'd; GI may consider Budesonide for future tx   Patient tolerated advance diet  ABX day # 3; discharged on course of Keflex/Flagyl to complete total of 5 days duration  Supportive care  GI following; continued recommendations are appreciated    Hypokalemia  Assessment & Plan  Recent Labs     06/21/24  0551 06/22/24  0602 06/23/24  0451   K 3.4* 3.2* 3.4*     Repleted  Check magnesium level  Trend    Hashimoto's thyroiditis  Assessment & Plan  H/o   TSH therapeutic on 06/19    Depression  Assessment & Plan  Continue Lexapro 20 mg daily  Mood pleasant and stable      Medical Problems       Resolved Problems  Date Reviewed: 6/21/2024   None       Discharging Physician / Practitioner: Sherry Bunn PA-C  PCP: Cornelia Farooq,   Admission Date:   Admission Orders (From admission, onward)       Ordered        06/19/24 1228  INPATIENT ADMISSION  Once                          Discharge Date: 06/23/24    Consultations During Hospital Stay:  GI    Procedures Performed:   None     Significant Findings / Test Results:   Please see A/P section above    Incidental Findings:   None     Test Results Pending at Discharge (will require follow up):   None      Outpatient Tests Requested:  Repeat CBC/BMP within 1 week of discharge; this was requested to be  "ordered by PCP    Complications: None    Reason for Admission: Pancolitis    Hospital Course:   Xenia Jones is a 54 y.o. female patient who originally presented to the hospital on 6/19/2024 due to pancolitis.  Patient has a past medical history of lymphocytic colitis.  She is known to [facility] as an outpatient.  She presented to the emergency department with 3 to 5 days of abdominal cramping, diarrhea, and poor p.o. intake.  Her CT of abdomen pelvis found mild, uncomplicated, infectious/inflammatory pancolitis.  Gastroenterology was consulted during hospital course.  Patient's CRP was noted to be more elevated than prior in 2020.  Patient's stool cultures/C. difficile CL PCR were negative.  Patient received IV fluids.  Patient's diet was eventually able to be advanced and she is tolerating solid foods at time of discharge.  Patient received 2 days of IV antibiotics during hospital course.  She will receive her third day of antibiotics as an outpatient with Keflex/Flagyl at discharge.  Patient will complete a total 5-day course of antibiotics.  Patient's outpatient meclizine was discontinued.  GI will follow-up further for inflammatory treatment/management long-term.    Please see above list of diagnoses and related plan for additional information.     Condition at Discharge: good    Discharge Day Visit / Exam:   Subjective: Patient is doing well this morning.  She would like to be discharged from the hospital.  She tolerated her advance diet.  She notes 5-6 loose bowel movements in the last 24 hours.  She is without abdominal pain or cramping.  She denies chest pain or shortness of breath.  She is urinating without discomfort.    Vitals: Blood Pressure: 131/92 (06/23/24 0724)  Pulse: 60 (06/23/24 0724)  Temperature: 97.5 °F (36.4 °C) (06/23/24 0724)  Temp Source: Oral (06/23/24 0724)  Respirations: 16 (06/23/24 0724)  Height: 5' 7\" (170.2 cm) (06/19/24 1500)  Weight - Scale: 110 kg (241 lb 10 oz) (06/19/24 " 1500)  SpO2: 95 % (06/23/24 0724)  Exam:   Physical Exam  Constitutional:       Comments: Pleasant, overweight   HENT:      Head: Normocephalic.      Right Ear: External ear normal.      Left Ear: External ear normal.      Nose: Nose normal.      Mouth/Throat:      Mouth: Mucous membranes are moist.      Pharynx: Oropharynx is clear.   Eyes:      Extraocular Movements: Extraocular movements intact.      Conjunctiva/sclera: Conjunctivae normal.   Cardiovascular:      Rate and Rhythm: Normal rate and regular rhythm.      Pulses: Normal pulses.      Heart sounds: Normal heart sounds.   Pulmonary:      Effort: Pulmonary effort is normal.      Breath sounds: Normal breath sounds.   Abdominal:      General: Abdomen is flat. Bowel sounds are normal. There is no distension.      Palpations: Abdomen is soft. There is no mass.      Tenderness: There is no abdominal tenderness.      Hernia: No hernia is present.   Musculoskeletal:         General: Normal range of motion.      Cervical back: Normal range of motion.   Skin:     General: Skin is warm and dry.   Neurological:      General: No focal deficit present.      Mental Status: She is alert. Mental status is at baseline.   Psychiatric:         Mood and Affect: Mood normal.         Behavior: Behavior normal.        Discussion with Family: Patient declined call to .     Discharge instructions/Information to patient and family:   See after visit summary for information provided to patient and family.      Provisions for Follow-Up Care:  See after visit summary for information related to follow-up care and any pertinent home health orders.      Mobility at time of Discharge:   Basic Mobility Inpatient Raw Score: 24  JH-HLM Goal: 8: Walk 250 feet or more  JH-HLM Achieved: 8: Walk 250 feet ot more  HLM Goal achieved. Continue to encourage appropriate mobility.     Disposition:   Home    Planned Readmission: None     Discharge Statement:  I spent 60 minutes  discharging the patient. This time was spent on the day of discharge. I had direct contact with the patient on the day of discharge. Greater than 50% of the total time was spent examining patient, answering all patient questions, arranging and discussing plan of care with patient as well as directly providing post-discharge instructions.  Additional time then spent on discharge activities.    Discharge Medications:  See after visit summary for reconciled discharge medications provided to patient and/or family.      **Please Note: This note may have been constructed using a voice recognition system**

## 2024-06-23 NOTE — ASSESSMENT & PLAN NOTE
Pt w/ PMH of recently dx lymphocytic colitis, w/ c/o 3-5 days of cramping, diarrhea, and poor PO intake.   CT abd/pelvis: Mild uncomplicated infectious or inflammatory pancolitis. No free air or absces   CRP elevated, 22.7 (from 4.6 in 2020)  Afebrile  Giardia, enteric stool panel, C.diff negative   S/p IVFs; supportive care   Bentyl 20 mg QID PRN   Prior mesalamine d/c'd; GI may consider Budesonide for future tx   Patient tolerated advance diet  ABX day # 3; discharged on course of Keflex/Flagyl to complete total of 5 days duration  Supportive care  GI following; continued recommendations are appreciated

## 2024-06-24 ENCOUNTER — TELEPHONE (OUTPATIENT)
Dept: FAMILY MEDICINE CLINIC | Facility: CLINIC | Age: 54
End: 2024-06-24

## 2024-06-24 ENCOUNTER — TRANSITIONAL CARE MANAGEMENT (OUTPATIENT)
Dept: FAMILY MEDICINE CLINIC | Facility: CLINIC | Age: 54
End: 2024-06-24

## 2024-06-24 NOTE — TELEPHONE ENCOUNTER
[facility] pt [facility] please remove Dr. Farooq as PCP

## 2024-06-24 NOTE — UTILIZATION REVIEW
NOTIFICATION OF ADMISSION DISCHARGE   This is a Notification of Discharge from [facility]. Please be advised that this patient has been discharge from our facility. Below you will find the admission and discharge date and time including the patient’s disposition.   UTILIZATION REVIEW CONTACT:  Marissa Butler  Utilization   Network Utilization Review Department  Phone: [phone] carefully listen to the prompts. All voicemails are confidential.  Email: [email or web address]     ADMISSION INFORMATION  PRESENTATION DATE: 6/19/2024  9:19 AM  OBERVATION ADMISSION DATE:   INPATIENT ADMISSION DATE: 6/19/24 12:28 PM   DISCHARGE DATE: 6/23/2024 10:48 AM   DISPOSITION:Home/Self Care    Network Utilization Review Department  ATTENTION: Please call with any questions or concerns to [phone] and carefully listen to the prompts so that you are directed to the right person. All voicemails are confidential.   For Discharge needs, contact Care Management DC Support Team at [phone] opt. [phone]  Send all requests for admission clinical reviews, approved or denied determinations and any other requests to dedicated fax number below belonging to the campus where the patient is receiving treatment. List of dedicated fax numbers for the Facilities:  FACILITY NAME UR FAX NUMBER   ADMISSION DENIALS (Administrative/Medical Necessity) [phone]   DISCHARGE SUPPORT TEAM ([facility]) [phone]   PARENT CHILD HEALTH (Maternity/NICU/Pediatrics) [phone]   [facility] [phone]   [facility] [phone]   [facility] [phone]   [facility] [phone]   [facility] [phone]   [facility] [phone]   [facility] [phone]   [facility] [phone]    [facility] [phone]   [facility] [phone]   [facility] [phone]   [facility] [phone]

## 2024-06-25 NOTE — TELEPHONE ENCOUNTER
06/25/24 3:29 PM        The office's request has been received, reviewed, and the patient chart updated. The PCP has successfully been removed with a patient attribution note. This message will now be completed.        Thank you  Jackie Bach

## 2024-06-27 ENCOUNTER — APPOINTMENT (OUTPATIENT)
Dept: LAB | Facility: AMBULARY SURGERY CENTER | Age: 54
End: 2024-06-27
Payer: COMMERCIAL

## 2024-06-27 ENCOUNTER — OFFICE VISIT (OUTPATIENT)
Dept: GASTROENTEROLOGY | Facility: CLINIC | Age: 54
End: 2024-06-27
Payer: COMMERCIAL

## 2024-06-27 VITALS
SYSTOLIC BLOOD PRESSURE: 122 MMHG | WEIGHT: 244.4 LBS | BODY MASS INDEX: 38.36 KG/M2 | DIASTOLIC BLOOD PRESSURE: 78 MMHG | HEIGHT: 67 IN | TEMPERATURE: 97.6 F

## 2024-06-27 DIAGNOSIS — Z00.00 PREVENTATIVE HEALTH CARE: ICD-10-CM

## 2024-06-27 DIAGNOSIS — K52.9 CHRONIC DIARRHEA: ICD-10-CM

## 2024-06-27 DIAGNOSIS — K52.832 LYMPHOCYTIC COLITIS: ICD-10-CM

## 2024-06-27 DIAGNOSIS — R10.84 GENERALIZED ABDOMINAL PAIN: ICD-10-CM

## 2024-06-27 DIAGNOSIS — R10.84 GENERALIZED ABDOMINAL PAIN: Primary | ICD-10-CM

## 2024-06-27 LAB
ANION GAP SERPL CALCULATED.3IONS-SCNC: 5 MMOL/L (ref 4–13)
BUN SERPL-MCNC: 12 MG/DL (ref 5–25)
CALCIUM SERPL-MCNC: 10.2 MG/DL (ref 8.4–10.2)
CHLORIDE SERPL-SCNC: 104 MMOL/L (ref 96–108)
CO2 SERPL-SCNC: 32 MMOL/L (ref 21–32)
CREAT SERPL-MCNC: 0.84 MG/DL (ref 0.6–1.3)
GFR SERPL CREATININE-BSD FRML MDRD: 79 ML/MIN/1.73SQ M
GLUCOSE SERPL-MCNC: 84 MG/DL (ref 65–140)
HBV CORE AB SER QL: NORMAL
HBV SURFACE AB SER-ACNC: <3 MIU/ML
HBV SURFACE AG SER QL: NORMAL
HCV AB SER QL: NORMAL
POTASSIUM SERPL-SCNC: 4.2 MMOL/L (ref 3.5–5.3)
SODIUM SERPL-SCNC: 141 MMOL/L (ref 135–147)

## 2024-06-27 PROCEDURE — 87340 HEPATITIS B SURFACE AG IA: CPT

## 2024-06-27 PROCEDURE — 86803 HEPATITIS C AB TEST: CPT

## 2024-06-27 PROCEDURE — 86704 HEP B CORE ANTIBODY TOTAL: CPT

## 2024-06-27 PROCEDURE — 80048 BASIC METABOLIC PNL TOTAL CA: CPT

## 2024-06-27 PROCEDURE — 36415 COLL VENOUS BLD VENIPUNCTURE: CPT

## 2024-06-27 PROCEDURE — 86706 HEP B SURFACE ANTIBODY: CPT

## 2024-06-27 PROCEDURE — 99214 OFFICE O/P EST MOD 30 MIN: CPT | Performed by: INTERNAL MEDICINE

## 2024-06-27 RX ORDER — BUDESONIDE 9 MG/1
TABLET, FILM COATED, EXTENDED RELEASE ORAL
Qty: 250 TABLET | Refills: 0 | Status: SHIPPED | OUTPATIENT
Start: 2024-06-27 | End: 2024-06-28

## 2024-06-27 NOTE — PROGRESS NOTES
[facility] - Outpatient Follow-up Note  Xenia Jones 54 y.o. female MRN: [id number]  Encounter: [id number]          ASSESSMENT AND PLAN:      1. Generalized abdominal pain  2.  Chronic diarrhea  3.  Lymphocytic colitis  4.  Preventative health    Patient had significant improvement in symptoms budesonide in the past.  Will give budesonide with taper over the next 3 months.  She is scheduled to see us in September where further management plan can be discussed with her based on symptom response to treatment.  She may benefit from repeat colonoscopy in 6 months even if her symptoms resolved with budesonide to make sure she has mucosal remission.  I ordered hepatitis B and C testing for her today and told her to start budesonide after getting the testing done and making sure that she does not have any or past infection from these viruses.  Patient agreeable to get these labs done later today.  Due to patient concern for allergies and recent diagnosis of Hashimoto's thyroiditis, will refer her to allergist and endocrinologist.  Referrals were placed today.    RTC as scheduled.    Bravo Burgos MD  Gastroenterology  [facility]  Date: June 27, 2024    - Hepatitis B core antibody, total; Future  - Hepatitis B surface antibody; Future  - Hepatitis B surface antigen; Future  - Hepatitis C antibody; Future  - Ambulatory Referral to Allergy; Future  - Ambulatory Referral to Endocrinology; Future    - budesonide (Uceris) 9 mg TB24; Take 9 mg by mouth in the morning for 60 days, THEN 6 mg in the morning for 14 days, THEN 3 mg in the morning for 14 days.  Dispense: 250 tablet; Refill: 0      ______________________________________________________________________    SUBJECTIVE: 54-year-old with history of lymphocytic colitis presents for follow-up.    Patient had recent hospitalization earlier in June with flareup of symptoms.  CT scan during hospitalization showed colon wall  thickening throughout the colon based on my review of the results today.  Stool infectious workup was done which was reviewed by me today and found to be negative.  Patient was given IV antibiotics and then switch to oral antibiotics the course of which she finished yesterday.  She was discharged from hospital after symptoms improved.    Patient reports that she was initially diagnosed with lymphocytic colitis back in 2020.  At that time she was given budesonide which significantly helped resolve symptoms.  Thereafter she was off budesonide for couple of years without any symptoms until recurrence of the symptoms a year ago.  Since then patient had recent colonoscopy last month confirming uncontrolled lymphocytic colitis.  Currently not taking any medications for treatment of lymphocytic colitis.  She reports 5-8 softer stools which are more formed now than during the flareup of symptoms.  No blood in stools.  She does have abdominal cramps.  No nausea or vomiting.  Weight is stable.  Patient wonders if she has allergies as well since she has been avoiding gluten lately.  She would like referral to allergist.  She would also like referral to endocrinologist since she has been diagnosed with Hashimoto's recently.      REVIEW OF SYSTEMS IS OTHERWISE NEGATIVE.      Historical Information   Past Medical History:   Diagnosis Date    Anxiety     Asthma     no current issues    Colitis     Colon polyp     Dysfunctional uterine bleeding     H/O total vaginal hysterectomy 02/19/2021    S/P left oophorectomy 02/19/2021     Past Surgical History:   Procedure Laterality Date    DILATION AND CURETTAGE OF UTERUS      DILATION AND CURETTAGE OF UTERUS N/A 2/9/2017    Procedure: DILATATION AND CURETTAGE (D&C);  Surgeon: Alexy Gamez MD;  Location: [facility];  Service:     ENDOMETRIAL ABLATION N/A 2/9/2017    Procedure: ABLATION ENDOMETRIAL NOVASURE;  Surgeon: Alexy Gamez MD;  Location: [facility];  Service:     HYSTERECTOMY   2021    INDUCED       By Dilation and Evacuation    OOPHORECTOMY Left 2021    Procedure: OOPHORECTOMY, LAPAROSCOPIC;  Surgeon: Kandy Marquez MD;  [facility];  Service: Gynecology    CO CYSTOURETHROSCOPY N/A 2021    Procedure: CYSTOSCOPY;  Surgeon: Kandy Marquez MD;  [facility];  Service: Gynecology    CO LAPS ABD PRTM&OMENTUM DX W/WO SPEC BR/WA SPX N/A 2021    Procedure: LAPAROSCOPY DIAGNOSTIC;  Surgeon: Kandy Marquez MD;  [facility];  Service: Gynecology    CO VAG HYST 250 GM/< W/RMVL TUBE&/OVARY N/A 2021    Procedure: HYSTERECTOMY VAGINAL TOTAL (TVH) WITH BILATERAL SALPINGECTOMY;  Surgeon: Kandy Marquez MD;  [facility];  Service: Gynecology     Social History   Social History     Substance and Sexual Activity   Alcohol Use Yes    Alcohol/week: 2.0 standard drinks of alcohol    Comment: socially     Social History     Substance and Sexual Activity   Drug Use Never     Social History     Tobacco Use   Smoking Status Former    Current packs/day: 0.00    Average packs/day: 1 pack/day for 15.2 years (15.2 ttl pk-yrs)    Types: Cigarettes    Start date: 1986    Quit date: 2001    Years since quittin.9    Passive exposure: Never   Smokeless Tobacco Never     Family History   Problem Relation Age of Onset    Hypothyroidism Mother     Lung cancer Mother 65    Cancer Mother     Heart disease Father     Venous thrombosis Father         of Deep Vessels of Lower Extremity    Aortic aneurysm Father     Breast cancer Paternal Grandmother 60    Hypertension Paternal Grandfather     Breast cancer Maternal Aunt 50    No Known Problems Maternal Aunt     No Known Problems Maternal Aunt     Breast cancer Paternal Aunt 50    Breast cancer Paternal Aunt 50    No Known Problems Paternal Aunt     No Known Problems Paternal Aunt     Esophageal cancer Paternal Uncle 63    Diabetes Family     Cancer Family        Meds/Allergies  "      Current Outpatient Medications:     budesonide (Uceris) 9 mg TB24    Cyanocobalamin (VITAMIN B 12 PO)    dicyclomine (BENTYL) 10 mg capsule    escitalopram (LEXAPRO) 20 mg tablet    No Known Allergies        Objective     Blood pressure 122/78, temperature 97.6 °F (36.4 °C), height 5' 7\" (1.702 m), weight 111 kg (244 lb 6.4 oz), last menstrual period 12/27/2020, not currently breastfeeding. Body mass index is 38.28 kg/m².      PHYSICAL EXAM:      General Appearance:   Alert, cooperative, no distress   HEENT:   Normocephalic, atraumatic, anicteric.     Neck:  Supple, symmetrical, trachea midline   Lungs:   Clear to auscultation bilaterally; no rales, rhonchi or wheezing; respirations unlabored    Heart::   Regular rate and rhythm; no murmur, rub, or gallop.   Abdomen:   Soft, non-tender, non-distended; normal bowel sounds; no masses, no organomegaly    Genitalia:   Deferred    Rectal:   Deferred    Extremities:  No cyanosis, clubbing or edema    Pulses:  2+ and symmetric    Skin:  No jaundice, rashes, or lesions    Lymph nodes:  No palpable cervical lymphadenopathy        Lab Results:   No visits with results within 1 Day(s) from this visit.   Latest known visit with results is:   Admission on 06/19/2024, Discharged on 06/23/2024   Component Date Value    Ventricular Rate 06/19/2024 82     Atrial Rate 06/19/2024 82     WY Interval 06/19/2024 156     QRSD Interval 06/19/2024 84     QT Interval 06/19/2024 380     QTC Interval 06/19/2024 443     P Axis 06/19/2024 70     QRS Axis 06/19/2024 89     T Wave Axis 06/19/2024 75     WBC 06/19/2024 6.71     RBC 06/19/2024 5.52 (H)     Hemoglobin 06/19/2024 16.2 (H)     Hematocrit 06/19/2024 47.3 (H)     MCV 06/19/2024 86     MCH 06/19/2024 29.3     MCHC 06/19/2024 34.2     RDW 06/19/2024 12.6     MPV 06/19/2024 10.7     Platelets 06/19/2024 350     nRBC 06/19/2024 0     Segmented % 06/19/2024 70     Immature Grans % 06/19/2024 0     Lymphocytes % 06/19/2024 15     " Monocytes % 06/19/2024 12     Eosinophils Relative 06/19/2024 3     Basophils Relative 06/19/2024 0     Absolute Neutrophils 06/19/2024 4.64     Absolute Immature Grans 06/19/2024 0.02     Absolute Lymphocytes 06/19/2024 1.02     Absolute Monocytes 06/19/2024 0.81     Eosinophils Absolute 06/19/2024 0.20     Basophils Absolute 06/19/2024 0.02     Sodium 06/19/2024 136     Potassium 06/19/2024 3.5     Chloride 06/19/2024 105     CO2 06/19/2024 21     ANION GAP 06/19/2024 10     BUN 06/19/2024 13     Creatinine 06/19/2024 0.94     Glucose 06/19/2024 138     Calcium 06/19/2024 10.0     AST 06/19/2024 19     ALT 06/19/2024 27     Alkaline Phosphatase 06/19/2024 97     Total Protein 06/19/2024 7.2     Albumin 06/19/2024 4.4     Total Bilirubin 06/19/2024 0.91     eGFR 06/19/2024 69     Lipase 06/19/2024 12     CRP 06/19/2024 22.7 (H)     LACTIC ACID 06/19/2024 1.8     hs TnI 0hr 06/19/2024 <2     C.difficile toxin by PCR 06/20/2024 Negative     Salmonella sp PCR 06/19/2024 Negative     Shigella sp/Enteroinvasi* 06/19/2024 Negative     Campylobacter sp (jejuni* 06/19/2024 Negative     Shiga toxin 1/Shiga toxi* 06/19/2024 Negative     Calprotectin 06/19/2024 2720 (H)     Color, UA 06/19/2024 Light Yellow     Clarity, UA 06/19/2024 Clear     Specific Gravity, UA 06/19/2024 >=1.050 (H)     pH, UA 06/19/2024 6.0     Leukocytes, UA 06/19/2024 Negative     Nitrite, UA 06/19/2024 Negative     Protein, UA 06/19/2024 50 (1+) (A)     Glucose, UA 06/19/2024 Negative     Ketones, UA 06/19/2024 20 (1+) (A)     Urobilinogen, UA 06/19/2024 <2.0     Bilirubin, UA 06/19/2024 Negative     Occult Blood, UA 06/19/2024 Negative     TSH 3RD GENERATON 06/19/2024 2.794     Giardia Ag, Stl 06/19/2024 Negative     RBC, UA 06/19/2024 1-2     WBC, UA 06/19/2024 None Seen     Epithelial Cells 06/19/2024 Occasional     Bacteria, UA 06/19/2024 None Seen     MUCUS THREADS 06/19/2024 Occasional (A)     WBC 06/20/2024 3.63 (L)     RBC 06/20/2024 4.46      Hemoglobin 06/20/2024 13.3     Hematocrit 06/20/2024 39.1     MCV 06/20/2024 88     MCH 06/20/2024 29.1     MCHC 06/20/2024 33.2     RDW 06/20/2024 12.5     Platelets 06/20/2024 257     MPV 06/20/2024 10.4     Sodium 06/20/2024 140     Potassium 06/20/2024 3.6     Chloride 06/20/2024 111 (H)     CO2 06/20/2024 24     ANION GAP 06/20/2024 5     BUN 06/20/2024 11     Creatinine 06/20/2024 0.76     Glucose 06/20/2024 89     Calcium 06/20/2024 8.8     eGFR 06/20/2024 89     WBC 06/21/2024 4.66     RBC 06/21/2024 4.46     Hemoglobin 06/21/2024 13.0     Hematocrit 06/21/2024 38.5     MCV 06/21/2024 86     MCH 06/21/2024 29.1     MCHC 06/21/2024 33.8     RDW 06/21/2024 12.4     Platelets 06/21/2024 260     MPV 06/21/2024 10.3     Sodium 06/21/2024 141     Potassium 06/21/2024 3.4 (L)     Chloride 06/21/2024 109 (H)     CO2 06/21/2024 27     ANION GAP 06/21/2024 5     BUN 06/21/2024 6     Creatinine 06/21/2024 0.75     Glucose 06/21/2024 96     Calcium 06/21/2024 8.8     eGFR 06/21/2024 91     WBC 06/22/2024 3.73 (L)     RBC 06/22/2024 4.63     Hemoglobin 06/22/2024 13.5     Hematocrit 06/22/2024 39.9     MCV 06/22/2024 86     MCH 06/22/2024 29.2     MCHC 06/22/2024 33.8     RDW 06/22/2024 12.5     Platelets 06/22/2024 271     MPV 06/22/2024 10.4     Sodium 06/22/2024 140     Potassium 06/22/2024 3.2 (L)     Chloride 06/22/2024 106     CO2 06/22/2024 28     ANION GAP 06/22/2024 6     BUN 06/22/2024 4 (L)     Creatinine 06/22/2024 0.74     Glucose 06/22/2024 91     Calcium 06/22/2024 8.9     eGFR 06/22/2024 92     WBC 06/23/2024 3.93 (L)     RBC 06/23/2024 4.33     Hemoglobin 06/23/2024 12.7     Hematocrit 06/23/2024 37.2     MCV 06/23/2024 86     MCH 06/23/2024 29.3     MCHC 06/23/2024 34.1     RDW 06/23/2024 12.4     Platelets 06/23/2024 259     MPV 06/23/2024 10.7     Sodium 06/23/2024 140     Potassium 06/23/2024 3.4 (L)     Chloride 06/23/2024 107     CO2 06/23/2024 29     ANION GAP 06/23/2024 4     BUN 06/23/2024 3 (L)      Creatinine 06/23/2024 0.72     Glucose 06/23/2024 90     Calcium 06/23/2024 8.8     eGFR 06/23/2024 95     Magnesium 06/23/2024 2.1          Radiology Results:   CT abdomen pelvis with contrast    Result Date: 6/19/2024  Narrative: CT ABDOMEN AND PELVIS WITH IV CONTRAST INDICATION: Very frequent loose stools, abdominal cramping, now n/v, history lymphocytic colitis. COMPARISON: Pelvic ultrasound dated 10/19/2020. Chest CTA dated 12/6/2023. TECHNIQUE: CT examination of the abdomen and pelvis was performed. Multiplanar 2D reformatted images were created from the source data. This examination, like all CT scans performed in the [facility], was performed utilizing techniques to minimize radiation dose exposure, including the use of iterative reconstruction and automated exposure control. Radiation dose length product (DLP) for this visit: 1755 mGy-cm IV Contrast: 100 mL of iohexol (OMNIPAQUE) Enteric Contrast: Not administered. FINDINGS: ABDOMEN LOWER CHEST: No clinically significant abnormality in the visualized lower chest. LIVER/BILIARY TREE: Unremarkable. GALLBLADDER: No calcified gallstones. No pericholecystic inflammatory change. SPLEEN: Unremarkable. PANCREAS: Unremarkable. ADRENAL GLANDS: Unremarkable. KIDNEYS/URETERS: No suspicious parenchymal masses, perinephric collections, urolithiasis, or hydronephrosis. STOMACH AND BOWEL: Stomach appears normal for level of nondistention. No dilated or inflamed loops of small bowel. Mild pancolonic wall thickening, greatest at the rectum. No evident involvement of the terminal ileum. No associated colonic distention. Mild pericolonic vascular combing. No significant pericolonic fat stranding. APPENDIX: No findings to suggest appendicitis. ABDOMINOPELVIC CAVITY: No ascites or encapsulated collections. No free air. No mesenteric, retroperitoneal, or pelvic sidewall lymphadenopathy. VESSELS: Unremarkable for patient's age. PELVIS REPRODUCTIVE ORGANS: Post  hysterectomy. No suspicious adnexal masses. URINARY BLADDER: Normal when accounting for degree of nondistention. ABDOMINAL WALL/INGUINAL REGIONS: Unremarkable. BONES: Chronic pars defects at L5 without significant listhesis. No acute fracture or suspicious osseous lesion.     Impression: Mild uncomplicated infectious or inflammatory pancolitis. No free air or abscess. Workstation performed: [id number]     Colonoscopy    Result Date: 5/30/2024  Narrative: Table formatting from the original result was not included. [facility] [address] [facility] [id number] [phone] DATE OF SERVICE: 5/30/24 PHYSICIAN(S): Attending: Xenia Herrera DO Fellow: No Staff Documented INDICATION: Change in bowel habit, Diarrhea, unspecified type POST-OP DIAGNOSIS: See the impression below. HISTORY: Prior colonoscopy: 4 years ago. BOWEL PREPARATION: Miralax/Dulcolax PREPROCEDURE: Informed consent was obtained for the procedure, including sedation. Risks including but not limited to bleeding, infection, perforation, adverse drug reaction and aspiration were explained in detail. Also explained about less than 100% sensitivity with the exam and other alternatives. The patient was placed in the left lateral decubitus position. Procedure: Colonoscopy DETAILS OF PROCEDURE: Patient was taken to the procedure room where a time out was performed to confirm correct patient and correct procedure. The patient underwent monitored anesthesia care, which was administered by an anesthesia professional. The patient's blood pressure, heart rate, level of consciousness, oxygen, respirations, ECG and ETCO2 were monitored throughout the procedure. A digital rectal exam was performed. The scope was introduced through the anus and advanced to the cecum. Photodocumentation was obtained at the ileocecal valve, appendiceal orifice and retroflexed view of the rectum. Retroflexion was performed in the  rectum. The quality of bowel preparation was evaluated using the [place] Bowel Preparation Scale with scores of: right colon = 2, transverse colon = 2, left colon = 2. The total BBPS score was 6. Bowel prep was adequate. The patient experienced no blood loss. The procedure was not difficult. The patient tolerated the procedure well. There were no apparent adverse events. ANESTHESIA INFORMATION: ASA: II Anesthesia Type: IV Sedation with Anesthesia MEDICATIONS: lactated ringers infusion 550 mL*  *From user-documented volume (Totals for administrations occurring from 1238 to 1305 on 05/30/24) FINDINGS: Normal Terminal ileum. The cecum appeared normal. One 6 mm sessile and adenomatous-appearing polyp in the rectum; performed cold snare removal The rectum was normal on direct and retroflexed views. EVENTS: Procedure Events Event Event Time ENDO CECUM REACHED 5/30/2024 12:49 PM ENDO SCOPE OUT TIME 5/30/2024  1:04 PM SPECIMENS: ID Type Source Tests Collected by Time Destination 1 : random colon bx hx of microscopic colitis Tissue Colon TISSUE EXAM Xenia Herrera, DO 5/30/2024 12:50 PM  2 : rectum - cold snare Tissue Polyp, Colorectal TISSUE EXAM Xenia Herrera, DO 5/30/2024  1:03 PM  EQUIPMENT: Colonoscope -[id number] ENDOCUFF VISION LRG GREEN ID [id number]     Impression: Normal Terminal ileum. The cecum appeared normal. Subcentimeter polyp in the rectum was removed with cold snare The rectum was otherwise normal on direct and retroflexed views. RECOMMENDATION: Await pathology results Repeat colonoscopy in 7 years, due: 5/29/2031 Personal history of colon polyps    Xenia Herrera DO    Answers submitted by the patient for this visit:  Inflammatory Bowel Disease (Submitted on 6/25/2024)  When you are not experiencing symptoms of your inflammatory bowel disease, how many bowel movements do you typically have each day?: 6 or more  What is the average (typical) number of bowel movements that you had in a single  day during the last week?: 6  Over the last 3 days, have you had any bowel movements where you passed blood without stool?: No  Since your last visit, have you received any vaccinations?: No  Since the last visit, have you had an infection?: No  Is there any possibility that you may be pregnant?: No  In the past three months, have you used tobacco in any form?: No  During the last year, how many days have you missed work or school because of your inflammatory bowel disease?: 8  During the last year, how many days have you been hospitalized because of your inflammatory bowel disease?: 1  During the last year, how many days have you visited a hospital emergency department because of your inflammatory bowel disease?: 1  During the last month, have you taken narcotic pain medications (such as Percocet, oxycodone, Oxycontin, morphine, Vicodin, Dilaudid, MS Contin) for your inflammatory bowel disease?: No  Have you awoken at night because you needed to move your bowels during the last month? : Yes  Have you had leakage of stool while sleeping during the last month?: No  Have you had leakage of stool while you were awake during the last month?: Yes  In the last 6 months, have you unintentionally lost weight?: No  Fever: No  Eye irritation: Yes  Mouth sores: No  Sore throat: Yes  Chest pain: No  Shortness of breath: No  Numbness or tingling in your hands or feet: No  Skin rash: No  Pain or swelling in your joints: No  Bruising or bleeding: No  Felt depressed or blue: Yes

## 2024-06-28 RX ORDER — BUDESONIDE 9 MG/1
TABLET, FILM COATED, EXTENDED RELEASE ORAL
Qty: 250 TABLET | Refills: 0 | Status: SHIPPED | OUTPATIENT
Start: 2024-06-28 | End: 2024-09-24

## 2024-06-30 ENCOUNTER — TELEPHONE (OUTPATIENT)
Age: 54
End: 2024-06-30

## 2024-07-01 DIAGNOSIS — K52.832 LYMPHOCYTIC COLITIS: Primary | ICD-10-CM

## 2024-07-01 RX ORDER — BUDESONIDE 3 MG/1
CAPSULE, COATED PELLETS ORAL
Qty: 222 CAPSULE | Refills: 0 | Status: SHIPPED | OUTPATIENT
Start: 2024-07-01 | End: 2024-09-27

## 2024-07-01 NOTE — TELEPHONE ENCOUNTER
No pharmacy benefits listed in pt chart  Called pharmacy  BIN [id number]  PCN [id number]  GRP   ID [id number]  PHONE NUMBER OF INSURANCE   NAME OF INSURANCE [facility]

## 2024-07-01 NOTE — TELEPHONE ENCOUNTER
PA for budesonide    Submitted via    [x]CMM-KEY [id number]   []Surescripts-Case ID #   []Faxed to plan   []Other website   []Phone call Case ID #     Office notes sent, clinical questions answered. Awaiting determination    Turnaround time for your insurance to make a decision on your Prior Authorization can take 7-21 business days.

## 2024-07-01 NOTE — TELEPHONE ENCOUNTER
Pharmacist calling to confirm dosing of medication. Transferred call to [facility] in triage.

## 2024-07-01 NOTE — TELEPHONE ENCOUNTER
Pharmacist with insurance ([facility]) called regarding Rx. Rx written for 9 mg Extended Release and titrating down to 6 mg and then 3 mg. They can change auth to reflect 3 mg tabs however pharmacy will need new Rx as they are trying to run 9 mg.

## 2024-11-20 ENCOUNTER — APPOINTMENT (OUTPATIENT)
Dept: LAB | Facility: AMBULARY SURGERY CENTER | Age: 54
End: 2024-11-20
Payer: COMMERCIAL

## 2025-01-07 ENCOUNTER — TELEPHONE (OUTPATIENT)
Age: 55
End: 2025-01-07

## 2025-01-07 NOTE — TELEPHONE ENCOUNTER
Patients GI provider:  Dr. Issa / Sharon / FRANCISCA Simmons    Number to return call: [phone]    Reason for call: Pt calling stating she was told at hospital discharge to schedule a 6 month repeat colonoscopy. Found no mention or order for this. Last Colonoscopy was 5/30/24, Hospital 6/19/24.      Scheduled procedure/appointment date if applicable: 6/27/24

## 2025-01-08 ENCOUNTER — TELEMEDICINE (OUTPATIENT)
Dept: GASTROENTEROLOGY | Facility: CLINIC | Age: 55
End: 2025-01-08
Payer: COMMERCIAL

## 2025-01-08 DIAGNOSIS — K52.9 CHRONIC DIARRHEA: ICD-10-CM

## 2025-01-08 DIAGNOSIS — K52.832 LYMPHOCYTIC COLITIS: Primary | ICD-10-CM

## 2025-01-08 DIAGNOSIS — R14.0 BLOATING: ICD-10-CM

## 2025-01-08 DIAGNOSIS — Z00.00 PREVENTATIVE HEALTH CARE: ICD-10-CM

## 2025-01-08 DIAGNOSIS — Z86.0100 HISTORY OF COLON POLYPS: ICD-10-CM

## 2025-01-08 PROCEDURE — 99214 OFFICE O/P EST MOD 30 MIN: CPT | Performed by: INTERNAL MEDICINE

## 2025-01-08 RX ORDER — BISACODYL 5 MG/1
20 TABLET, DELAYED RELEASE ORAL ONCE
Qty: 4 TABLET | Refills: 0 | Status: SHIPPED | OUTPATIENT
Start: 2025-01-08 | End: 2025-01-08

## 2025-01-08 RX ORDER — SODIUM CHLORIDE, SODIUM LACTATE, POTASSIUM CHLORIDE, CALCIUM CHLORIDE 600; 310; 30; 20 MG/100ML; MG/100ML; MG/100ML; MG/100ML
125 INJECTION, SOLUTION INTRAVENOUS CONTINUOUS
OUTPATIENT
Start: 2025-01-08

## 2025-01-08 RX ORDER — POLYETHYLENE GLYCOL 3350 17 G/17G
238 POWDER, FOR SOLUTION ORAL ONCE
Qty: 238 G | Refills: 0 | Status: SHIPPED | OUTPATIENT
Start: 2025-01-08 | End: 2025-01-08

## 2025-01-08 NOTE — PROGRESS NOTES
Virtual Regular Visit  Name: Xenia Jones      : 1970      MRN: [id number]  Encounter Provider: Bravo Burgos MD  Encounter Date: 2025   Encounter department: [facility] [place]      Verification of patient location:  Patient is located at Home in the following state in which I hold an active license [place] :  Assessment & Plan  Lymphocytic colitis  Patient has chronic illness with lymphocytic colitis and symptom of diarrhea and bloating.  She is taking Lexapro for depression and anxiety.  Lexapro/SSRI are associated with microscopic colitis.  I discussed this with the patient today.  She shares with me that Lexapro controls her psychiatric symptoms significantly and when in the past she was switched to another antidepressant, she had severe symptoms.  I discussed with her that we could try to come off the Lexapro and give a course of budesonide and see if her symptoms resolve.  Another out could be that we do a colonoscopy and see if she still has lymphocytic colitis in which case she will have definitive evidence of the disease despite budesonide and may help her in her decision about taking Lexapro in the future.  Patient told me that if she continues to have lymphocytic colitis and repeat colonoscopy then she would definitely consider switching from Lexapro to another agent.  She is agreeable to get the colonoscopy done.  I ordered the procedure and prep today.  Will have the staff schedule the procedure for the patient.  Regarding history of colon polyps, patient is up-to-date with surveillance.  She had large precancerous polyp removed in the past.  Follow-up colonoscopy did not show any polyps in .  Regarding preventative health care patient had hepatitis B and C screening test done in 2024 which were reviewed by me today and found to be negative for the infections.      Orders:    Colonoscopy; Future    bisacodyl (DULCOLAX) 5 mg EC tablet; Take 4  tablets (20 mg total) by mouth once for 1 dose Colonoscopy prep    polyethylene glycol (MIRALAX) 17 g packet; Take 238 g by mouth once for 1 dose For bowel prep. Mix in 64 oz of gatorade. Drink 32 oz at the rate of 8 oz/1 glass every 15 mins starting at 5 pm on the evening prior to day of procedure. Drink the remaining 32 oz 5 hours prior to procedure time at at the rate of 8 oz/1 glass every 15 mins until finished.    Chronic diarrhea  As above.  Orders:    Colonoscopy; Future    bisacodyl (DULCOLAX) 5 mg EC tablet; Take 4 tablets (20 mg total) by mouth once for 1 dose Colonoscopy prep    polyethylene glycol (MIRALAX) 17 g packet; Take 238 g by mouth once for 1 dose For bowel prep. Mix in 64 oz of gatorade. Drink 32 oz at the rate of 8 oz/1 glass every 15 mins starting at 5 pm on the evening prior to day of procedure. Drink the remaining 32 oz 5 hours prior to procedure time at at the rate of 8 oz/1 glass every 15 mins until finished.    Bloating  As above.  Orders:    Colonoscopy; Future    bisacodyl (DULCOLAX) 5 mg EC tablet; Take 4 tablets (20 mg total) by mouth once for 1 dose Colonoscopy prep    polyethylene glycol (MIRALAX) 17 g packet; Take 238 g by mouth once for 1 dose For bowel prep. Mix in 64 oz of gatorade. Drink 32 oz at the rate of 8 oz/1 glass every 15 mins starting at 5 pm on the evening prior to day of procedure. Drink the remaining 32 oz 5 hours prior to procedure time at at the rate of 8 oz/1 glass every 15 mins until finished.    History of colon polyps  As above.       Preventative health care  As above.             Encounter provider Bravo Burgos MD    The patient was identified by name and date of birth. Xenia Jones was informed that this is a telemedicine visit and that the visit is being conducted through the Epic Embedded platform. She agrees to proceed..  My office door was closed. No one else was in the room.  She acknowledged consent and understanding of privacy and security of  the video platform. The patient has agreed to participate and understands they can discontinue the visit at any time.    Patient is aware this is a billable service.     History of Present Illness     54-year-old with depression/anxiety, lymphocytic colitis, history of colon polyps presents for follow-up.    Patient had colonoscopy in May 2024 where colon biopsy showed lymphocytic colitis.  Previously EGD was unremarkable including gastric and duodenal biopsy.  During last office visit in June 2024 I started patient on 3-month course of budesonide with taper since she had benefited from budesonide in the past.  ET of the abdomen done in June 2024 showed pancolitis.  Plan was made to monitor symptoms while she takes budesonide and afterwards.  Patient had blood work done in November 2024 which was reviewed by me today and found to have normal TSH.    Patient reports that she completed course of budesonide.  She says that she was having 5-6 loose stools per day prior to starting the course and since completing the course.  During the course of budesonide her bowel movement frequency decreased to 3-4 bowel movements per day which remained loose.  She continues to have bloating.  No blood in stools.  No nausea, vomiting or abdominal pain.  Occasional heartburn.  She is taking Lexapro which helps control depression and anxiety.      Review of Systems   Constitutional:  Negative for chills and fever.   HENT:  Negative for ear pain and sore throat.    Eyes:  Negative for pain and visual disturbance.   Respiratory:  Negative for cough and shortness of breath.    Cardiovascular:  Negative for chest pain and palpitations.   Gastrointestinal:  Negative for abdominal pain and vomiting.   Genitourinary:  Negative for dysuria and hematuria.   Musculoskeletal:  Negative for arthralgias and back pain.   Skin:  Negative for color change and rash.   Neurological:  Negative for seizures and syncope.   All other systems reviewed and  are negative.      Objective   LMP 12/27/2020 (Exact Date)     Physical Exam  No physical exam was performed as this is a telemedicine visit.  Visit Time  Total Visit Duration: 30 minutes

## 2025-01-09 ENCOUNTER — TELEPHONE (OUTPATIENT)
Dept: GASTROENTEROLOGY | Facility: CLINIC | Age: 55
End: 2025-01-09

## 2025-01-09 NOTE — TELEPHONE ENCOUNTER
----- Message from Lea FLORES sent at 1/8/2025  4:44 PM EST -----    ----- Message -----  From: Bravo Burgos MD  Sent: 1/8/2025   2:56 PM EST  To: #    Patient was seen for virtual visit today.  She is agreeable to get colonoscopy done.  She would prefer to get it done on February 6, 2025 at [facility].  Procedure and prep ordered.  Schedule follow-up in 6 months with me in the office or put on wait list if no slot available.  Please schedule colonoscopy.  Patient can be checked out from today's appointment.

## 2025-01-09 NOTE — TELEPHONE ENCOUNTER
Scheduled date of colonoscopy (as of today): 1/22/25  Physician performing colonoscopy: Dr. Burgos  Location of colonoscopy:   Bowel prep reviewed with patient: Miralax/Dulc  Instructions reviewed with patient by: Patricia lara  Clearances: none    Scheduled 6 month f/u and placed on wait list

## 2025-01-20 ENCOUNTER — HOSPITAL ENCOUNTER (OUTPATIENT)
Dept: MAMMOGRAPHY | Facility: HOSPITAL | Age: 55
Discharge: HOME | End: 2025-01-20
Attending: OBSTETRICS & GYNECOLOGY
Payer: COMMERCIAL

## 2025-01-20 VITALS — BODY MASS INDEX: 38.3 KG/M2 | WEIGHT: 244 LBS | HEIGHT: 67 IN

## 2025-01-20 DIAGNOSIS — Z12.31 ENCOUNTER FOR SCREENING MAMMOGRAM FOR MALIGNANT NEOPLASM OF BREAST: ICD-10-CM

## 2025-01-20 PROCEDURE — 77063 BREAST TOMOSYNTHESIS BI: CPT

## 2025-01-20 PROCEDURE — 77067 SCR MAMMO BI INCL CAD: CPT

## 2025-01-22 ENCOUNTER — ANESTHESIA (OUTPATIENT)
Dept: GASTROENTEROLOGY | Facility: HOSPITAL | Age: 55
End: 2025-01-22
Payer: COMMERCIAL

## 2025-01-22 ENCOUNTER — ANESTHESIA EVENT (OUTPATIENT)
Dept: GASTROENTEROLOGY | Facility: HOSPITAL | Age: 55
End: 2025-01-22
Payer: COMMERCIAL

## 2025-01-22 ENCOUNTER — HOSPITAL ENCOUNTER (OUTPATIENT)
Dept: GASTROENTEROLOGY | Facility: HOSPITAL | Age: 55
Setting detail: OUTPATIENT SURGERY
Discharge: HOME | End: 2025-01-22
Attending: INTERNAL MEDICINE
Payer: COMMERCIAL

## 2025-01-22 VITALS
TEMPERATURE: 97.8 F | RESPIRATION RATE: 18 BRPM | OXYGEN SATURATION: 97 % | HEART RATE: 66 BPM | SYSTOLIC BLOOD PRESSURE: 126 MMHG | DIASTOLIC BLOOD PRESSURE: 73 MMHG

## 2025-01-22 DIAGNOSIS — R14.0 BLOATING: ICD-10-CM

## 2025-01-22 DIAGNOSIS — K52.9 CHRONIC DIARRHEA: ICD-10-CM

## 2025-01-22 DIAGNOSIS — K52.832 LYMPHOCYTIC COLITIS: ICD-10-CM

## 2025-01-22 PROCEDURE — 88305 TISSUE EXAM BY PATHOLOGIST: CPT | Performed by: PATHOLOGY

## 2025-01-22 RX ORDER — SODIUM CHLORIDE, SODIUM LACTATE, POTASSIUM CHLORIDE, CALCIUM CHLORIDE 600; 310; 30; 20 MG/100ML; MG/100ML; MG/100ML; MG/100ML
125 INJECTION, SOLUTION INTRAVENOUS CONTINUOUS
Status: DISCONTINUED | OUTPATIENT
Start: 2025-01-22 | End: 2025-01-26 | Stop reason: HOSPADM

## 2025-01-22 RX ORDER — PROPOFOL 10 MG/ML
INJECTION, EMULSION INTRAVENOUS AS NEEDED
Status: DISCONTINUED | OUTPATIENT
Start: 2025-01-22 | End: 2025-01-22

## 2025-01-22 RX ADMIN — SODIUM CHLORIDE, SODIUM LACTATE, POTASSIUM CHLORIDE, AND CALCIUM CHLORIDE 125 ML/HR: .6; .31; .03; .02 INJECTION, SOLUTION INTRAVENOUS at 12:09

## 2025-01-22 RX ADMIN — Medication 40 MG: at 12:43

## 2025-01-22 RX ADMIN — PROPOFOL 100 MG: 10 INJECTION, EMULSION INTRAVENOUS at 12:38

## 2025-01-22 RX ADMIN — SODIUM CHLORIDE, SODIUM LACTATE, POTASSIUM CHLORIDE, AND CALCIUM CHLORIDE: .6; .31; .03; .02 INJECTION, SOLUTION INTRAVENOUS at 13:08

## 2025-01-22 RX ADMIN — PROPOFOL 100 MG: 10 INJECTION, EMULSION INTRAVENOUS at 12:42

## 2025-01-22 RX ADMIN — PROPOFOL 100 MG: 10 INJECTION, EMULSION INTRAVENOUS at 12:47

## 2025-01-22 RX ADMIN — PROPOFOL 100 MG: 10 INJECTION, EMULSION INTRAVENOUS at 12:53

## 2025-01-22 RX ADMIN — PROPOFOL 50 MG: 10 INJECTION, EMULSION INTRAVENOUS at 12:57

## 2025-01-22 NOTE — H&P
History and Physical - [facility]  Xenia Jones 54 y.o. female MRN: [id number]                  HPI: Xenia Jones is a 54 y.o. who presents for colonoscopy to investigate history of lymphocytic colitis, chronic diarrhea, bloating.      REVIEW OF SYSTEMS: Per the HPI, and otherwise unremarkable.    Historical Information   Past Medical History:   Diagnosis Date    Anxiety     Asthma     no current issues    Colitis     Colon polyp     Dysfunctional uterine bleeding     H/O total vaginal hysterectomy 2021    S/P left oophorectomy 2021    Sleep apnea     cpap     Past Surgical History:   Procedure Laterality Date    DILATION AND CURETTAGE OF UTERUS      DILATION AND CURETTAGE OF UTERUS N/A 2017    Procedure: DILATATION AND CURETTAGE (D&C);  Surgeon: Alexy Gamez MD;  Location: [facility];  Service:     ENDOMETRIAL ABLATION N/A 2017    Procedure: ABLATION ENDOMETRIAL NOVASURE;  Surgeon: Alexy Gamez MD;  Location[facility];  Service:     HYSTERECTOMY  2021    INDUCED       By Dilation and Evacuation    OOPHORECTOMY Left 2021    Procedure: OOPHORECTOMY, LAPAROSCOPIC;  Surgeon: Kandy Marquez MD;  [facility];  Service: Gynecology    HI CYSTOURETHROSCOPY N/A 2021    Procedure: CYSTOSCOPY;  Surgeon: Kandy Marquez MD;  [facility];  Service: Gynecology    HI LAPS ABD PRTM&OMENTUM DX W/WO SPEC BR/WA SPX N/A 2021    Procedure: LAPAROSCOPY DIAGNOSTIC;  Surgeon: Kandy Marquez MD;  [facility];  Service: Gynecology    HI VAG HYST 250 GM/< W/RMVL TUBE&/OVARY N/A 2021    Procedure: HYSTERECTOMY VAGINAL TOTAL (TVH) WITH BILATERAL SALPINGECTOMY;  Surgeon: Kandy Marquez MD;  [facility];  Service: Gynecology     Social History   Social History     Substance and Sexual Activity   Alcohol Use Yes    Alcohol/week: 2.0 standard drinks of alcohol    Comment: socially     Social History     Substance  and Sexual Activity   Drug Use Never     Social History     Tobacco Use   Smoking Status Former    Current packs/day: 0.00    Average packs/day: 1 pack/day for 15.2 years (15.2 ttl pk-yrs)    Types: Cigarettes    Start date: 1986    Quit date: 2001    Years since quittin.4    Passive exposure: Never   Smokeless Tobacco Never     Family History   Problem Relation Age of Onset    Hypothyroidism Mother     Lung cancer Mother 65    Cancer Mother     Heart disease Father     Venous thrombosis Father         of Deep Vessels of Lower Extremity    Aortic aneurysm Father     Breast cancer Paternal Grandmother 60    Hypertension Paternal Grandfather     Breast cancer Maternal Aunt 50    No Known Problems Maternal Aunt     No Known Problems Maternal Aunt     Breast cancer Paternal Aunt 50    Breast cancer Paternal Aunt 50    No Known Problems Paternal Aunt     No Known Problems Paternal Aunt     Esophageal cancer Paternal Uncle 63       Meds/Allergies     Not in a hospital admission.    No Known Allergies    Objective     Blood pressure 138/80, pulse 66, temperature 97.6 °F (36.4 °C), temperature source Temporal, resp. rate 16, last menstrual period 2020, SpO2 97%, not currently breastfeeding.      PHYSICAL EXAM    Gen: NAD  CV: RRR  CHEST: Clear  ABD: soft, NT/ND  EXT: no edema      ASSESSMENT/PLAN:  Xenia Jones is a 54 y.o. who presents for colonoscopy to investigate history of lymphocytic colitis, chronic diarrhea, bloating. The patient is stable and optimized for the procedure, we reviewed risk and benefits. Risk include but not limited to infection, bleeding, perforation and missing a lesion.

## 2025-01-22 NOTE — ANESTHESIA PREPROCEDURE EVALUATION
Procedure:  COLONOSCOPY    Relevant Problems   NEURO/PSYCH   (+) Depression   (+) Major depressive disorder with single episode, in full remission (HCC)      PULMONARY   (+) VENKATA (obstructive sleep apnea)        Physical Exam    Airway    Mallampati score: II  TM Distance: <3 FB  Neck ROM: full     Dental   No notable dental hx     Cardiovascular  Cardiovascular exam normal    Pulmonary  Pulmonary exam normal     Other Findings  post-pubertal.      Anesthesia Plan  ASA Score- 2     Anesthesia Type- IV sedation with anesthesia with ASA Monitors.         Additional Monitors:     Airway Plan:            Plan Factors-Exercise tolerance (METS): >4 METS.    Chart reviewed.   Existing labs reviewed.     Patient is not a current smoker. Patient not instructed to abstain from smoking on day of procedure. Patient did not smoke on day of surgery.            Induction- intravenous.    Postoperative Plan-         Informed Consent- Anesthetic plan and risks discussed with patient.  I personally reviewed this patient with the CRNA. Discussed and agreed on the Anesthesia Plan with the CRNA..      NPO Status:  Vitals Value Taken Time   Date of last liquid 01/22/25 01/22/25 1154   Time of last liquid 0820 01/22/25 1154   Date of last solid 01/20/25 01/22/25 1154   Time of last solid 1630 01/22/25 1154

## 2025-01-22 NOTE — ANESTHESIA POSTPROCEDURE EVALUATION
Post-Op Assessment Note    CV Status:  Stable  Pain Score: 0    Pain management: adequate       Mental Status:  Alert and awake   Hydration Status:  Euvolemic   PONV Controlled:  Controlled   Airway Patency:  Patent     Post Op Vitals Reviewed: Yes    No anethesia notable event occurred.    Staff: CRNA           Last Filed PACU Vitals:  Vitals Value Taken Time   Temp 98 1309   Pulse 57    /69    Resp 16    SpO2 97

## 2025-01-27 PROCEDURE — 88305 TISSUE EXAM BY PATHOLOGIST: CPT | Performed by: PATHOLOGY

## 2025-01-28 ENCOUNTER — RESULTS FOLLOW-UP (OUTPATIENT)
Dept: GASTROENTEROLOGY | Facility: CLINIC | Age: 55
End: 2025-01-28

## 2025-01-28 DIAGNOSIS — K52.832 LYMPHOCYTIC COLITIS: Primary | ICD-10-CM

## 2025-01-28 RX ORDER — BUDESONIDE 3 MG/1
CAPSULE, COATED PELLETS ORAL
Qty: 312 CAPSULE | Refills: 0 | Status: SHIPPED | OUTPATIENT
Start: 2025-01-28 | End: 2025-05-26

## 2025-01-29 ENCOUNTER — TELEPHONE (OUTPATIENT)
Age: 55
End: 2025-01-29

## 2025-01-29 NOTE — TELEPHONE ENCOUNTER
Patient called office stating she attempted to schedule diagnostic breast ultrasound and diagnostic mammogram through central scheduling and was unable to get an appointment until March. She would like to have both orders faxed to [facility].   Provided her [facility] with [employer] phone number to attempt to schedule sooner ( [phone]) and faxed both orders to [facility] Central scheduling at [phone].  She offers no further questions.

## 2025-02-19 DIAGNOSIS — K76.89 AUTOIMMUNE LIVER DISEASE: Primary | ICD-10-CM

## 2025-02-19 DIAGNOSIS — R53.83 OTHER FATIGUE: Primary | ICD-10-CM

## 2025-02-20 ENCOUNTER — APPOINTMENT (OUTPATIENT)
Dept: LAB | Facility: AMBULARY SURGERY CENTER | Age: 55
End: 2025-02-20
Payer: COMMERCIAL

## 2025-02-20 ENCOUNTER — HOSPITAL ENCOUNTER (OUTPATIENT)
Dept: ULTRASOUND IMAGING | Facility: HOSPITAL | Age: 55
End: 2025-02-20
Payer: COMMERCIAL

## 2025-02-20 DIAGNOSIS — K76.89 AUTOIMMUNE LIVER DISEASE: ICD-10-CM

## 2025-02-20 DIAGNOSIS — R53.83 OTHER FATIGUE: ICD-10-CM

## 2025-02-20 LAB
IGA SERPL-MCNC: 76 MG/DL (ref 66–433)
IGG SERPL-MCNC: 908 MG/DL (ref 635–1741)
IGM SERPL-MCNC: 53 MG/DL (ref 45–281)
T4 FREE SERPL-MCNC: 0.88 NG/DL (ref 0.61–1.12)
TSH SERPL DL<=0.05 MIU/L-ACNC: 7.49 UIU/ML (ref 0.45–4.5)

## 2025-02-20 PROCEDURE — 76981 USE PARENCHYMA: CPT

## 2025-02-20 PROCEDURE — 36415 COLL VENOUS BLD VENIPUNCTURE: CPT

## 2025-02-20 PROCEDURE — 86015 ACTIN ANTIBODY EACH: CPT

## 2025-02-20 PROCEDURE — 82784 ASSAY IGA/IGD/IGG/IGM EACH: CPT

## 2025-02-20 PROCEDURE — 84439 ASSAY OF FREE THYROXINE: CPT

## 2025-02-20 PROCEDURE — 84443 ASSAY THYROID STIM HORMONE: CPT

## 2025-02-21 LAB — ACTIN IGG SERPL-ACNC: 99 UNITS (ref 0–19)

## 2025-02-26 ENCOUNTER — RESULTS FOLLOW-UP (OUTPATIENT)
Dept: GASTROENTEROLOGY | Facility: CLINIC | Age: 55
End: 2025-02-26

## 2025-02-26 DIAGNOSIS — K76.89 AUTOIMMUNE LIVER DISEASE: Primary | ICD-10-CM

## 2025-03-28 NOTE — PROGRESS NOTES
Name: Xenia Jones      : 1970      MRN: [id number]  Encounter Provider: Lindsay Ballesteros MD  Encounter Date: 3/31/2025   Encounter department: [facility] [place]  Chief Complaint: Abnormal thyroid tests   :  Assessment & Plan  Hashimoto's thyroiditis  She will continue levothyroxine 25 mcg daily for now and I will repeat her TSH and free T4 today to see if her dose needs further adjustment  Discussed taking this appropriately first thing in the morning on an empty stomach and waiting at least 30 minutes to eat food, 1 hour to take medications in 4 hours to take any calcium or iron containing supplements  Recommend she additionally continue follow-up with her rheumatologist and be fitted for a new oral appliance to treat her sleep apnea as some of her symptoms could be related to these underlying conditions  RTC in 4 months for a follow-up visit    Orders:    Ambulatory Referral to Endocrinology    TSH, 3rd generation; Future    T4, free; Future    Chronic diarrhea  In the setting of muscle cramping and chronic diarrhea, will check a magnesium level    Orders:    Magnesium; Future    Muscle cramping  In the setting of chronic diarrhea, will check a magnesium level    Orders:    Magnesium; Future    Prediabetes  Refer to nutrition services to discuss dietary modifications to assist with glycemic control and weight loss    Orders:    Ambulatory Referral to Nutrition Services; Future        History of Present Illness     Xenia Jones is a 54 y.o. female with a past medical history significant for prediabetes, VENKATA, Hashimoto's thyroiditis, and obesity who presents for initial evalaution of her abnormal TSH levels. She is referred by GI.     Current regimen: Levothyroxine 25 mcg daily (since )    She was on budesonide at the time of her recent TFT check and will continue this until .     She endorses fatigue, difficulty losing weight despite exercising, hot  flashes, chronic diarrhea, depression, and anxiety. These symptoms have been ongoing for some time and her fatigue improved somewhat with levothyroxine, but over the last week she has noted worsening fatigue.     Her menstrual cycles used to be heavy but regular. She underwent a hysterectomy with unilateral oophorectomy in 2021 for her menorrhagia.     Thyroid ultrasound from 3/4/25 showed a 4 mm right midpole nodule. She denies any neck swelling or neck compressive symptoms.     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 312 capsule 0    Cyanocobalamin (VITAMIN B 12 PO) Take by mouth      escitalopram (LEXAPRO) 20 mg tablet Take 1 tablet (20 mg total) by mouth daily for 10 days 10 tablet 0     No current facility-administered medications on file prior to visit.      Social History     Tobacco Use    Smoking status: Former     Current packs/day: 0.00     Average packs/day: 1 pack/day for 15.2 years (15.2 ttl pk-yrs)     Types: Cigarettes     Start date: 6/1/1986  "    Quit date: 2001     Years since quittin.6     Passive exposure: Never    Smokeless tobacco: Never   Vaping Use    Vaping status: Never Used   Substance and Sexual Activity    Alcohol use: Yes     Alcohol/week: 2.0 standard drinks of alcohol     Comment: socially    Drug use: Never    Sexual activity: Not Currently     Partners: Male     Birth control/protection: Post-menopausal, Surgical, None      Medical History Reviewed by provider this encounter.     Objective   /78 (BP Location: Left arm, Patient Position: Sitting, Cuff Size: Adult)   Ht 5' 7\" (1.702 m)   Wt 108 kg (238 lb 12.8 oz)   LMP 2020 (Exact Date)   BMI 37.40 kg/m²      Body mass index is 37.4 kg/m².  Wt Readings from Last 3 Encounters:   25 111 kg (244 lb)   25 111 kg (244 lb)   24 111 kg (244 lb 6.4 oz)     Physical Exam  Vitals and nursing note reviewed.   Constitutional:       General: She is not in acute distress.     Appearance: She is well-developed.   HENT:      Head: Normocephalic and atraumatic.   Eyes:      Conjunctiva/sclera: Conjunctivae normal.   Cardiovascular:      Rate and Rhythm: Normal rate and regular rhythm.      Heart sounds: No murmur heard.  Pulmonary:      Effort: Pulmonary effort is normal. No respiratory distress.      Breath sounds: Normal breath sounds.   Abdominal:      Palpations: Abdomen is soft.      Tenderness: There is no abdominal tenderness.   Musculoskeletal:         General: No swelling.      Cervical back: Neck supple.   Skin:     General: Skin is warm and dry.      Capillary Refill: Capillary refill takes less than 2 seconds.      Findings: Bruising present.   Neurological:      Mental Status: She is alert.   Psychiatric:         Mood and Affect: Mood normal.       Labs:   Lab Results   Component Value Date    HGBA1C 5.3 2024    HGBA1C 5.7 (H) 2023    HGBA1C 5.2 2023     Lab Results   Component Value Date    CREATININE 0.95 2025    CREATININE " 0.84 06/27/2024    CREATININE 0.72 06/23/2024    BUN 17 02/06/2025    K 4.7 02/06/2025     02/06/2025    CO2 31 02/06/2025     eGFRcr   Date Value Ref Range Status   02/06/2025 71 >59 Final     Lab Results   Component Value Date    HDL 53 07/22/2023    TRIG 104 07/22/2023     Lab Results   Component Value Date    ALT 21 02/06/2025    AST 16 02/06/2025    ALKPHOS 92 02/06/2025     Lab Results   Component Value Date    FUY8PNBQPQPL 7.494 (H) 02/20/2025    PDE9UGDTEXHU 3.467 11/20/2024    GFA2QGHMZRSY 2.794 06/19/2024     Lab Results   Component Value Date    FREET4 0.88 02/20/2025     Discussed with the patient and all questioned fully answered. She will call me if any problems arise.

## 2025-03-31 ENCOUNTER — CONSULT (OUTPATIENT)
Dept: ENDOCRINOLOGY | Facility: CLINIC | Age: 55
End: 2025-03-31
Payer: COMMERCIAL

## 2025-03-31 ENCOUNTER — APPOINTMENT (OUTPATIENT)
Dept: LAB | Facility: CLINIC | Age: 55
End: 2025-03-31
Payer: COMMERCIAL

## 2025-03-31 VITALS
DIASTOLIC BLOOD PRESSURE: 78 MMHG | SYSTOLIC BLOOD PRESSURE: 120 MMHG | BODY MASS INDEX: 37.48 KG/M2 | WEIGHT: 238.8 LBS | HEIGHT: 67 IN

## 2025-03-31 DIAGNOSIS — R73.03 PREDIABETES: ICD-10-CM

## 2025-03-31 DIAGNOSIS — K52.9 CHRONIC DIARRHEA: ICD-10-CM

## 2025-03-31 DIAGNOSIS — E06.3 HASHIMOTO'S THYROIDITIS: Primary | ICD-10-CM

## 2025-03-31 DIAGNOSIS — E06.3 HASHIMOTO'S THYROIDITIS: ICD-10-CM

## 2025-03-31 DIAGNOSIS — R25.2 MUSCLE CRAMPING: ICD-10-CM

## 2025-03-31 LAB
MAGNESIUM SERPL-MCNC: 2.1 MG/DL (ref 1.9–2.7)
T4 FREE SERPL-MCNC: 0.93 NG/DL (ref 0.61–1.12)
TSH SERPL DL<=0.05 MIU/L-ACNC: 2.68 UIU/ML (ref 0.45–4.5)

## 2025-03-31 PROCEDURE — 99204 OFFICE O/P NEW MOD 45 MIN: CPT | Performed by: INTERNAL MEDICINE

## 2025-03-31 PROCEDURE — 84443 ASSAY THYROID STIM HORMONE: CPT

## 2025-03-31 PROCEDURE — 36415 COLL VENOUS BLD VENIPUNCTURE: CPT

## 2025-03-31 PROCEDURE — 83735 ASSAY OF MAGNESIUM: CPT

## 2025-03-31 PROCEDURE — 84439 ASSAY OF FREE THYROXINE: CPT

## 2025-03-31 RX ORDER — LEVOTHYROXINE SODIUM 25 UG/1
25 TABLET ORAL DAILY
COMMUNITY

## 2025-03-31 NOTE — ASSESSMENT & PLAN NOTE
Refer to nutrition services to discuss dietary modifications to assist with glycemic control and weight loss    Orders:    Ambulatory Referral to Nutrition Services; Future

## 2025-03-31 NOTE — PATIENT INSTRUCTIONS
Continue the levothyroxine 25 mcg daily for the time being. Please take your levothyroxine on an empty stomach first thing in the morning with water only. You should wait at least 30 minutes after taking levothyroxine to eat and 1 hour to take regular medications. You should wait at least 4 hours to take medications like iron, calcium or calcium-containing medications like Tums, and acid reflux medications like omeprazole, esomeprazole, pantoprazole, and famotidine.

## 2025-03-31 NOTE — ASSESSMENT & PLAN NOTE
She will continue levothyroxine 25 mcg daily for now and I will repeat her TSH and free T4 today to see if her dose needs further adjustment  Discussed taking this appropriately first thing in the morning on an empty stomach and waiting at least 30 minutes to eat food, 1 hour to take medications in 4 hours to take any calcium or iron containing supplements  Recommend she additionally continue follow-up with her rheumatologist and be fitted for a new oral appliance to treat her sleep apnea as some of her symptoms could be related to these underlying conditions  RTC in 4 months for a follow-up visit    Orders:    Ambulatory Referral to Endocrinology    TSH, 3rd generation; Future    T4, free; Future

## 2025-04-01 ENCOUNTER — RESULTS FOLLOW-UP (OUTPATIENT)
Dept: ENDOCRINOLOGY | Facility: CLINIC | Age: 55
End: 2025-04-01

## 2025-04-18 ENCOUNTER — ANNUAL EXAM (OUTPATIENT)
Dept: OBGYN CLINIC | Facility: CLINIC | Age: 55
End: 2025-04-18
Payer: COMMERCIAL

## 2025-04-18 VITALS
BODY MASS INDEX: 37.51 KG/M2 | WEIGHT: 239 LBS | DIASTOLIC BLOOD PRESSURE: 80 MMHG | SYSTOLIC BLOOD PRESSURE: 126 MMHG | HEIGHT: 67 IN

## 2025-04-18 DIAGNOSIS — N95.1 MENOPAUSAL HOT FLUSHES: ICD-10-CM

## 2025-04-18 DIAGNOSIS — Z01.419 WOMEN'S ANNUAL ROUTINE GYNECOLOGICAL EXAMINATION: Primary | ICD-10-CM

## 2025-04-18 PROCEDURE — S0612 ANNUAL GYNECOLOGICAL EXAMINA: HCPCS | Performed by: OBSTETRICS & GYNECOLOGY

## 2025-04-18 RX ORDER — ESTRADIOL 0.03 MG/D
1 FILM, EXTENDED RELEASE TRANSDERMAL 2 TIMES WEEKLY
Qty: 24 PATCH | Refills: 3 | Status: SHIPPED | OUTPATIENT
Start: 2025-04-21 | End: 2026-03-23
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
## 2025-05-22 DIAGNOSIS — N95.1 MENOPAUSAL HOT FLUSHES: ICD-10-CM

## 2025-05-22 RX ORDER — ESTRADIOL 0.03 MG/D
1 FILM, EXTENDED RELEASE TRANSDERMAL 2 TIMES WEEKLY
Qty: 24 PATCH | Refills: 0 | Status: SHIPPED | OUTPATIENT
Start: 2025-05-22 | End: 2026-04-23

## 2025-05-27 ENCOUNTER — APPOINTMENT (OUTPATIENT)
Dept: LAB | Facility: AMBULARY SURGERY CENTER | Age: 55
End: 2025-05-27
Attending: INTERNAL MEDICINE
Payer: COMMERCIAL

## 2025-05-27 DIAGNOSIS — K76.89 AUTOIMMUNE LIVER DISEASE: ICD-10-CM

## 2025-05-27 LAB
ALBUMIN SERPL BCG-MCNC: 4.2 G/DL (ref 3.5–5)
ALP SERPL-CCNC: 88 U/L (ref 34–104)
ALT SERPL W P-5'-P-CCNC: 17 U/L (ref 7–52)
ANION GAP SERPL CALCULATED.3IONS-SCNC: 9 MMOL/L (ref 4–13)
AST SERPL W P-5'-P-CCNC: 15 U/L (ref 13–39)
BILIRUB SERPL-MCNC: 0.64 MG/DL (ref 0.2–1)
BUN SERPL-MCNC: 10 MG/DL (ref 5–25)
CALCIUM SERPL-MCNC: 9.6 MG/DL (ref 8.4–10.2)
CHLORIDE SERPL-SCNC: 107 MMOL/L (ref 96–108)
CO2 SERPL-SCNC: 26 MMOL/L (ref 21–32)
CREAT SERPL-MCNC: 0.81 MG/DL (ref 0.6–1.3)
GFR SERPL CREATININE-BSD FRML MDRD: 82 ML/MIN/1.73SQ M
GLUCOSE P FAST SERPL-MCNC: 91 MG/DL (ref 65–99)
POTASSIUM SERPL-SCNC: 3.7 MMOL/L (ref 3.5–5.3)
PROT SERPL-MCNC: 6.4 G/DL (ref 6.4–8.4)
SODIUM SERPL-SCNC: 142 MMOL/L (ref 135–147)

## 2025-05-27 PROCEDURE — 80053 COMPREHEN METABOLIC PANEL: CPT

## 2025-05-27 PROCEDURE — 36415 COLL VENOUS BLD VENIPUNCTURE: CPT

## 2025-05-27 PROCEDURE — 86015 ACTIN ANTIBODY EACH: CPT

## 2025-05-28 ENCOUNTER — RESULTS FOLLOW-UP (OUTPATIENT)
Dept: GASTROENTEROLOGY | Facility: CLINIC | Age: 55
End: 2025-05-28

## 2025-05-28 LAB — ACTIN IGG SERPL-ACNC: 61 UNITS (ref 0–19)

## 2025-05-29 DIAGNOSIS — K76.9 LIVER DISEASE: ICD-10-CM

## 2025-05-29 DIAGNOSIS — K76.89 AUTOIMMUNE LIVER DISEASE: Primary | ICD-10-CM

## 2025-05-29 DIAGNOSIS — R79.89 ELEVATED LFTS: ICD-10-CM

## 2025-05-30 ENCOUNTER — PREP FOR PROCEDURE (OUTPATIENT)
Dept: INTERVENTIONAL RADIOLOGY/VASCULAR | Facility: CLINIC | Age: 55
End: 2025-05-30

## 2025-05-30 DIAGNOSIS — E06.3 HASHIMOTO'S THYROIDITIS: ICD-10-CM

## 2025-05-30 DIAGNOSIS — R79.82 ELEVATED C-REACTIVE PROTEIN (CRP): Primary | ICD-10-CM

## 2025-05-30 RX ORDER — SODIUM CHLORIDE 9 MG/ML
30 INJECTION, SOLUTION INTRAVENOUS CONTINUOUS
OUTPATIENT
Start: 2025-05-30

## 2025-06-10 ENCOUNTER — TELEPHONE (OUTPATIENT)
Dept: RADIOLOGY | Facility: HOSPITAL | Age: 55
End: 2025-06-10

## 2025-06-10 NOTE — PROGRESS NOTES
[facility]  [address] [place]   [address]  INTERVENTIONAL RADIOLOGY [phone]    You are scheduled for a liver biopsy on 6/17/25 at 1030.    Your arrival time is 0930.  Our Interventional Radiology nurse will notify you a few days before your procedure with the exact arrival time and location to arrive.    To prepare for your procedure:  Please arrange for someone to drive you home after the procedure and stay with you until the next morning if you are instructed to do so.  This is typically for patients receiving some type of sedative or anesthetic for the procedure.  DO NOT EAT OR DRINK ANYTHING after midnight on the evening before your procedure including candy & gum.  ONLY SIPS OF WATER with your medications are allowed on the morning of your procedure.  TAKE ALL OF YOUR REGULAR MEDICATIONS THE MORNING OF YOUR PROCEDURE with sips of water!    If you have an allergy to x-ray dye, please contact Interventional Radiology for an x-ray dye preparation which usually consists of an oral steroid and Benadryl.  If you wear a Glucose Monitor, you may be asked to remove it for your procedure if we are using x-ray.  These devices need to be removed when we are imaging with x-ray near the device since the radiation can cause the unit to malfunction.  If possible and not too inconvenient, you may want to schedule your exam closer to day 14 of your 14 day device so your device is not wasted.    The day of your procedure:  Bring a list of the medications you take at home.  Bring medications you take for breathing problems (such as inhalers), medications for chest pain, or both.  Bring your insurance card and a form of photo ID.  Bring a case for your glasses or contacts.  Please leave all valuables such as credit cards and jewelry at home.  Report to the registration desk in the main lobby at the [facility].  Ask to be directed to the After Procedure Unit on the 1st floor.  While your procedure  is being performed your family may wait in the Waiting Room on the 1st floor.  Be prepared to stay overnight just in case. Sometimes procedures will indicate the need for further observation or treatment.   If you are scheduled for a follow-up visit with the Interventional Radiologist after your procedure, you will be called with a date and time.    Special Instructions (Medications to alter or stop taking before your procedure etc.):      This procedure has an extended recovery of 4 hours post procedure. Please make sure your ride is aware and available.

## 2025-06-17 ENCOUNTER — HOSPITAL ENCOUNTER (OUTPATIENT)
Dept: RADIOLOGY | Facility: HOSPITAL | Age: 55
Discharge: HOME | End: 2025-06-17
Attending: RADIOLOGY
Payer: COMMERCIAL

## 2025-06-17 VITALS
DIASTOLIC BLOOD PRESSURE: 65 MMHG | HEIGHT: 67 IN | OXYGEN SATURATION: 97 % | WEIGHT: 239 LBS | RESPIRATION RATE: 16 BRPM | HEART RATE: 60 BPM | TEMPERATURE: 97.6 F | BODY MASS INDEX: 37.51 KG/M2 | SYSTOLIC BLOOD PRESSURE: 137 MMHG

## 2025-06-17 DIAGNOSIS — R79.82 ELEVATED C-REACTIVE PROTEIN (CRP): ICD-10-CM

## 2025-06-17 DIAGNOSIS — E06.3 HASHIMOTO'S THYROIDITIS: ICD-10-CM

## 2025-06-17 LAB
BASOPHILS # BLD AUTO: 0.02 THOUSANDS/ÂΜL (ref 0–0.1)
BASOPHILS NFR BLD AUTO: 0 % (ref 0–1)
EOSINOPHIL # BLD AUTO: 0.26 THOUSAND/ÂΜL (ref 0–0.61)
EOSINOPHIL NFR BLD AUTO: 5 % (ref 0–6)
ERYTHROCYTE [DISTWIDTH] IN BLOOD BY AUTOMATED COUNT: 12.3 % (ref 11.6–15.1)
HCT VFR BLD AUTO: 43.5 % (ref 34.8–46.1)
HGB BLD-MCNC: 14.8 G/DL (ref 11.5–15.4)
IMM GRANULOCYTES # BLD AUTO: 0.02 THOUSAND/UL (ref 0–0.2)
IMM GRANULOCYTES NFR BLD AUTO: 0 % (ref 0–2)
INR PPP: 0.85 (ref 0.85–1.19)
LYMPHOCYTES # BLD AUTO: 1.49 THOUSANDS/ÂΜL (ref 0.6–4.47)
LYMPHOCYTES NFR BLD AUTO: 29 % (ref 14–44)
MCH RBC QN AUTO: 30 PG (ref 26.8–34.3)
MCHC RBC AUTO-ENTMCNC: 34 G/DL (ref 31.4–37.4)
MCV RBC AUTO: 88 FL (ref 82–98)
MONOCYTES # BLD AUTO: 0.39 THOUSAND/ÂΜL (ref 0.17–1.22)
MONOCYTES NFR BLD AUTO: 8 % (ref 4–12)
NEUTROPHILS # BLD AUTO: 2.99 THOUSANDS/ÂΜL (ref 1.85–7.62)
NEUTS SEG NFR BLD AUTO: 58 % (ref 43–75)
NRBC BLD AUTO-RTO: 0 /100 WBCS
PLATELET # BLD AUTO: 291 THOUSANDS/UL (ref 149–390)
PMV BLD AUTO: 10.8 FL (ref 8.9–12.7)
PROTHROMBIN TIME: 12.3 SECONDS (ref 12.3–15)
RBC # BLD AUTO: 4.93 MILLION/UL (ref 3.81–5.12)
WBC # BLD AUTO: 5.17 THOUSAND/UL (ref 4.31–10.16)

## 2025-06-17 PROCEDURE — 85610 PROTHROMBIN TIME: CPT | Performed by: RADIOLOGY

## 2025-06-17 PROCEDURE — 76942 ECHO GUIDE FOR BIOPSY: CPT | Performed by: RADIOLOGY

## 2025-06-17 PROCEDURE — 47000 NEEDLE BIOPSY OF LIVER PERQ: CPT

## 2025-06-17 PROCEDURE — 99152 MOD SED SAME PHYS/QHP 5/>YRS: CPT

## 2025-06-17 PROCEDURE — 88307 TISSUE EXAM BY PATHOLOGIST: CPT | Performed by: PATHOLOGY

## 2025-06-17 PROCEDURE — 99152 MOD SED SAME PHYS/QHP 5/>YRS: CPT | Performed by: RADIOLOGY

## 2025-06-17 PROCEDURE — 76942 ECHO GUIDE FOR BIOPSY: CPT

## 2025-06-17 PROCEDURE — 99153 MOD SED SAME PHYS/QHP EA: CPT

## 2025-06-17 PROCEDURE — 88313 SPECIAL STAINS GROUP 2: CPT | Performed by: PATHOLOGY

## 2025-06-17 PROCEDURE — 47000 NEEDLE BIOPSY OF LIVER PERQ: CPT | Performed by: RADIOLOGY

## 2025-06-17 PROCEDURE — 85025 COMPLETE CBC W/AUTO DIFF WBC: CPT | Performed by: RADIOLOGY

## 2025-06-17 RX ORDER — ACETAMINOPHEN 325 MG/1
TABLET ORAL
Status: DISPENSED
Start: 2025-06-17 | End: 2025-06-17

## 2025-06-17 RX ORDER — FENTANYL CITRATE 50 UG/ML
INJECTION, SOLUTION INTRAMUSCULAR; INTRAVENOUS AS NEEDED
Status: COMPLETED | OUTPATIENT
Start: 2025-06-17 | End: 2025-06-17

## 2025-06-17 RX ORDER — LIDOCAINE WITH 8.4% SOD BICARB 0.9%(10ML)
SYRINGE (ML) INJECTION AS NEEDED
Status: COMPLETED | OUTPATIENT
Start: 2025-06-17 | End: 2025-06-17

## 2025-06-17 RX ORDER — ACETAMINOPHEN 325 MG/1
650 TABLET ORAL ONCE
Status: COMPLETED | OUTPATIENT
Start: 2025-06-17 | End: 2025-06-17

## 2025-06-17 RX ORDER — MIDAZOLAM HYDROCHLORIDE 2 MG/2ML
INJECTION, SOLUTION INTRAMUSCULAR; INTRAVENOUS AS NEEDED
Status: COMPLETED | OUTPATIENT
Start: 2025-06-17 | End: 2025-06-17

## 2025-06-17 RX ORDER — SODIUM CHLORIDE 9 MG/ML
30 INJECTION, SOLUTION INTRAVENOUS CONTINUOUS
Status: DISCONTINUED | OUTPATIENT
Start: 2025-06-17 | End: 2025-06-18 | Stop reason: HOSPADM

## 2025-06-17 RX ADMIN — ACETAMINOPHEN 650 MG: 325 TABLET ORAL at 11:13

## 2025-06-17 RX ADMIN — FENTANYL CITRATE 50 MCG: 50 INJECTION INTRAMUSCULAR; INTRAVENOUS at 10:23

## 2025-06-17 RX ADMIN — MIDAZOLAM 1 MG: 1 INJECTION INTRAMUSCULAR; INTRAVENOUS at 10:18

## 2025-06-17 RX ADMIN — MIDAZOLAM 1 MG: 1 INJECTION INTRAMUSCULAR; INTRAVENOUS at 10:23

## 2025-06-17 RX ADMIN — Medication 10 ML: at 10:27

## 2025-06-17 RX ADMIN — FENTANYL CITRATE 50 MCG: 50 INJECTION INTRAMUSCULAR; INTRAVENOUS at 10:18

## 2025-06-17 NOTE — DISCHARGE INSTRUCTIONS
Percutaneous Liver Biopsy   WHAT YOU NEED TO KNOW:   A PLB is a procedure to remove a sample of tissue from your liver. The sample can be sent to a lab and tested for liver disease, cancer, or infection. After the procedure you may have pain and bruising at the biopsy site. You may also have pain in your right shoulder. These symptoms should get better in 48 to 72 hours.    DISCHARGE INSTRUCTIONS:     [facility] and [facility] patients,  Contact Interventional Radiology at [phone]   [facility] PATIENTS: Contact Interventional Radiology at [phone]   [facility] PATIENTS: Contact Interventional Radiology at [phone] if:    Fever greater than 101 or chills  You have severe pain in your abdomen.    Your abdomen is larger than usual and feels hard.    Your neck is more swollen and you have trouble swallowing.    You feel weak or dizzy.    Your heart is beating faster than usual.   Your pain does not get better after you take pain medicine.    Your wound is red, swollen, or draining pus.   You have nausea or are vomiting.   Your skin is itchy, swollen, or you have a rash.   You have questions or concerns about your condition or care.  Medicines:   Acetaminophen decreases pain and fever. It is available without a doctor's order. Acetaminophen can cause liver damage if not taken correctly.   Take your home medicine as directed.  Resume your normal diet. Small sips of flat soda will help with mild nausea.  Self-care:   Rest as directed. Do not play sports, exercise, or lift anything heavier than 5 pounds for up to 1 week.     Apply firm, steady pressure if bleeding occurs. A small amount of bleeding from your wound is possible. Apply pressure with a clean gauze or towel for 5 to 10 minutes. Call 911 if bleeding becomes heavy or does not stop.    Ask your healthcare provider when to take your blood thinner or antiplatelet medicine. You may need to wait 24 to 72 hours to take your medicine. This will  prevent bleeding.  Follow up with your healthcare provider as directed: Write down your questions so you remember to ask them during your visits.         Procedural Sedation   WHAT YOU NEED TO KNOW:   Procedural sedation is medicine used during procedures to help you feel relaxed and calm. You will remember little to none of the procedure. After sedation you may feel tired, weak, or unsteady on your feet. You may also have trouble concentrating or short-term memory loss. These symptoms should go away in 24 hours or less.   DISCHARGE INSTRUCTIONS:     Call 911 or have someone else call for any of the following:   You have sudden trouble breathing.     You cannot be woken.      Contact Interventional Radiology at [phone]   ([facility] PATIENTS: Contact Interventional Radiology at [phone]) ([facility] PATIENTS: Contact Interventional Radiology at [phone]) if any of the following occur:     You have a severe headache or dizziness.     Your heart is beating faster than usual.    You have a fever or chills.     Your skin is itchy, swollen, or you have a rash.     You have nausea or are vomiting for more than 8 hours after the procedure.      You have questions or concerns about your condition or care.  Self-care:   Have someone stay with you for 24 hours. This person can drive you to errands and help you do things around the house. This person can also watch for problems.      Rest and do quiet activities for 24 hours. Do not exercise, ride a bike, or play sports. Stand up slowly to prevent dizziness and falls. Take short walks around the house with another person. Slowly return to your usual activities the next day.      Do not drive or use dangerous machines or tools for 24 hours. You may injure yourself or others. Examples include a lawnmower, saw, or drill. Do not return to work for 24 hours if you use dangerous machines or tools for work.      Do not make important decisions for 24 hours. For example, do not  sign important papers or invest money.      Drink liquids as directed. Liquids help flush the sedation medicine out of your body. Ask how much liquid to drink each day and which liquids are best for you.      Eat small, frequent meals to prevent nausea and vomiting. Start with clear liquids such as juice or broth. If you do not vomit after clear liquids, you can eat your usual foods.      Do not drink alcohol or take medicines that make you drowsy. This includes medicines that help you sleep and anxiety medicines. Ask your healthcare provider if it is safe for you to take pain medicine.  Follow up with your healthcare provider as directed: Write down your questions so you remember to ask them during your visits.

## 2025-06-17 NOTE — SEDATION DOCUMENTATION
Procedure ended. IR liver biopsy completed by Dr. Castle. Flowable d-stat and bandaid to site. IR post procedural bedrest x4 hours starting 1045.

## 2025-06-17 NOTE — H&P
"Interventional Radiology  History and Physical 6/17/2025     Xenia Jones   1970   [id number]    Assessment/Plan:  55 year old female with elevated liver enzymes. Here for non-target liver biopsy. Discussed risks of bleeding, infection and injury to adjacent structures the patient understands and wishes to proceed.    Problem List Items Addressed This Visit          Endocrine    Hashimoto's thyroiditis    Relevant Orders    IR biopsy liver random       Other    Elevated C-reactive protein (CRP)    Relevant Orders    IR biopsy liver random          Subjective:     Patient ID: Xenia Jones is a 55 y.o. female.    History of Present Illness  55 year old female with elevated liver enzymes. Here for non-target liver biopsy. Discussed risks of bleeding, infection and injury to adjacent structures the patient understands and wishes to proceed.    Review of Systems  as above    Past Medical History[1]     Past Surgical History[2]     Tobacco Use History[3]     Social History     Substance and Sexual Activity   Alcohol Use Not Currently    Alcohol/week: 2.0 standard drinks of alcohol    Types: 2 Standard drinks or equivalent per week    Comment: socially        Social History     Substance and Sexual Activity   Drug Use Never        Allergies[4]    Current Medications[5]       Objective:    Vitals:    06/17/25 1028 06/17/25 1033 06/17/25 1039 06/17/25 1043   BP: 159/81 163/76 151/72 145/70   Pulse: 59 60 62 63   Resp: 16 16 16 16   Temp:       TempSrc:       SpO2: 94% 97% 97% 97%   Weight:       Height:            Physical Exam  Gen: NAD  HEENT: Normocephalic  Lungs: breathing comfortably  Heart: regular rate  Abd: non-distended  Neuro: Grossly intact    No results found for: \"BNP\"   Lab Results   Component Value Date    WBC 5.17 06/17/2025    HGB 14.8 06/17/2025    HCT 43.5 06/17/2025    MCV 88 06/17/2025     06/17/2025     Lab Results   Component Value Date    INR 0.85 06/17/2025    INR 0.94 08/04/2017    " INR 0.98 2017    PROTIME 12.3 2025    PROTIME 12.9 2017    PROTIME 13.1 2017     Lab Results   Component Value Date    2017         I have personally reviewed pertinent imaging and laboratory results.     Code Status: Prior  Advance Directive and Living Will:      Power of :    POLST:      This text is generated with voice recognition software. There may be translation, syntax,  or grammatical errors. If you have any questions, please contact the dictating provider.        [1]   Past Medical History:  Diagnosis Date    Anxiety     Asthma     no current issues    Colitis     Colon polyp     Dysfunctional uterine bleeding     Ear problems Few months    Blocked    H/O total vaginal hysterectomy 2021    Hashimoto's disease     Otitis media Two in the last 3 months    S/P left oophorectomy 2021    Sleep apnea     cpap   [2]   Past Surgical History:  Procedure Laterality Date    DILATION AND CURETTAGE OF UTERUS      DILATION AND CURETTAGE OF UTERUS N/A 2017    Procedure: DILATATION AND CURETTAGE (D&C);  Surgeon: Alexy Gamez MD;  [facility];  Service:     ENDOMETRIAL ABLATION N/A 2017    Procedure: ABLATION ENDOMETRIAL NOVASURE;  Surgeon: Alexy Gamez MD;  [facility];  Service:     HYSTERECTOMY  2021    INDUCED       By Dilation and Evacuation    OOPHORECTOMY Left 2021    Procedure: OOPHORECTOMY, LAPAROSCOPIC;  Surgeon: Kandy Marquez MD;  [facility];  Service: Gynecology    MA CYSTOURETHROSCOPY N/A 2021    Procedure: CYSTOSCOPY;  Surgeon: Kandy Marquez MD;  [facility];  Service: Gynecology    MA LAPS ABD PRTM&OMENTUM DX W/WO SPEC BR/WA SPX N/A 2021    Procedure: LAPAROSCOPY DIAGNOSTIC;  Surgeon: Kandy Marquez MD;  [facility];  Service: Gynecology    MA VAG HYST 250 GM/< W/RMVL TUBE&/OVARY N/A 2021    Procedure: HYSTERECTOMY VAGINAL TOTAL (TVH) WITH BILATERAL  SALPINGECTOMY;  Surgeon: Kandy Marquez MD;  [facility];  Service: Gynecology   [3]   Social History  Tobacco Use   Smoking Status Former    Current packs/day: 0.00    Average packs/day: 1 pack/day for 15.2 years (15.2 ttl pk-yrs)    Types: Cigarettes    Start date: 1986    Quit date: 2001    Years since quittin.8    Passive exposure: Never   Smokeless Tobacco Never   [4] No Known Allergies  [5]   Current Outpatient Medications   Medication Sig Dispense Refill    Cyanocobalamin (VITAMIN B 12 PO) Take by mouth      escitalopram (LEXAPRO) 20 mg tablet Take 1 tablet (20 mg total) by mouth daily for 10 days 10 tablet 0    estradiol (Vivelle-Dot) 0.025 MG/24HR Place 1 patch on the skin 2 (two) times a week 24 patch 0    Lactobacillus (PROBIOTIC ACIDOPHILUS PO) Take 1 tablet by mouth in the morning      levothyroxine 25 mcg tablet Take 25 mcg by mouth in the morning.       Current Facility-Administered Medications   Medication Dose Route Frequency Provider Last Rate Last Admin    sodium chloride 0.9 % infusion  30 mL/hr Intravenous Continuous Dione Beal MD

## 2025-06-17 NOTE — BRIEF OP NOTE (RAD/CATH)
INTERVENTIONAL RADIOLOGY PROCEDURE NOTE    Date: 6/17/2025    Procedure:   Procedure Summary       Date: 06/17/25 Room / Location: [facility] Interventional Radiology    Anesthesia Start:  Anesthesia Stop:     Procedure: IR BIOPSY LIVER RANDOM Diagnosis:       Elevated C-reactive protein (CRP)      Hashimoto's thyroiditis      (Elevated LFTs, autoimmune hepatitis)    Scheduled Providers:  Responsible Provider:     Anesthesia Type: Not recorded ASA Status: Not recorded            Preoperative diagnosis:   1. Elevated C-reactive protein (CRP)    2. Hashimoto's thyroiditis         Postoperative diagnosis: Same.    Surgeon: Deep Castle MD     Assistant: None. No qualified resident was available.    Blood loss: Minimal    Specimens: 3, 18 G cores     Findings: Non-target liver biopsy, D-stat used. No riccardo-hepatic hematoma seen post procedure.    Complications: None immediate.    Anesthesia: conscious sedation

## 2025-06-18 PROCEDURE — 88307 TISSUE EXAM BY PATHOLOGIST: CPT | Performed by: PATHOLOGY

## 2025-06-18 PROCEDURE — 88313 SPECIAL STAINS GROUP 2: CPT | Performed by: PATHOLOGY

## 2025-06-22 NOTE — PROGRESS NOTES
[id number]    NAME: Karina Cain  AGE: 47 y.o. SEX: female  : 1970     DATE: 2023     Assessment and Plan:     Problem List Items Addressed This Visit    None      Immunizations and preventive care screenings were discussed with patient today. Appropriate education was printed on patient's after visit summary. Counseling:  · Exercise: the importance of regular exercise/physical activity was discussed. Recommend exercise 3-5 times per week for at least 30 minutes. No follow-ups on file. Chief Complaint:     Chief Complaint   Patient presents with   • Annual Exam   • Excessive Sweating   • Fatigue      History of Present Illness:     Adult Annual Physical   Patient here for a comprehensive physical exam. The patient reports no problems. She complains of a 10+ pound weight gain despite diet and exercise. She complains of intense fatigue and difficulty with the sleeping through the night. She can fall asleep, but not stay asleep. Diet and Physical Activity  · Diet/Nutrition: well balanced diet. · Exercise: walking. Depression Screening  PHQ-2/9 Depression Screening    Little interest or pleasure in doing things: 1 - several days  Feeling down, depressed, or hopeless: 1 - several days  Trouble falling or staying asleep, or sleeping too much: 1 - several days  Feeling tired or having little energy: 2 - more than half the days  Poor appetite or overeatin - several days  Feeling bad about yourself - or that you are a failure or have let yourself or your family down: 1 - several days  Trouble concentrating on things, such as reading the newspaper or watching television: 1 - several days  Moving or speaking so slowly that other people could have noticed.  Or the opposite - being so fidgety or restless that you have been moving around a lot more than usual: 0 - not at all  Thoughts that you would be better off dead, or of hurting yourself in some way: 0 - not at all  PHQ-9 Score: 8   PHQ-9 Interpretation: Mild depression        General Health  · Sleep: sleeps well. · Hearing: normal - bilateral.  · Vision: no vision problems. · Dental: regular dental visits. /GYN Health  · Patient is: perimenopausal  · Last menstrual period: irregular  · Contraceptive method: barrier methods. Review of Systems:     Review of Systems   Constitutional: Positive for fatigue and unexpected weight change. Negative for appetite change, chills and fever. + weight gain    HENT: Negative for ear pain, facial swelling, rhinorrhea, sinus pain, sore throat and trouble swallowing. Eyes: Negative for discharge and redness. Respiratory: Negative for chest tightness, shortness of breath and wheezing. Cardiovascular: Negative for chest pain and palpitations. Gastrointestinal: Negative for abdominal pain, diarrhea, nausea and vomiting. Endocrine: Negative for polyuria. Genitourinary: Negative for dysuria and urgency. Musculoskeletal: Negative for arthralgias and back pain. Skin: Negative for rash. Neurological: Negative for dizziness, weakness and headaches. Hematological: Negative for adenopathy. Psychiatric/Behavioral: Positive for sleep disturbance. Negative for behavioral problems and confusion. All other systems reviewed and are negative. Past Medical History:     Past Medical History:   Diagnosis Date   • Anxiety    • Asthma     no current issues   • Colitis    • Dysfunctional uterine bleeding    • H/O total vaginal hysterectomy 2/19/2021   • S/P left oophorectomy 2/19/2021      Past Surgical History:     Past Surgical History:   Procedure Laterality Date   • DILATION AND CURETTAGE OF UTERUS     • DILATION AND CURETTAGE OF UTERUS N/A 2/9/2017    Procedure: DILATATION AND CURETTAGE (D&C);   Surgeon: Lonzo Lundborg, MD;  [facility];  Service:    • ENDOMETRIAL ABLATION N/A 2017    Procedure: ABLATION ENDOMETRIAL [place] [facility];  Surgeon: Dale Bolton MD;  [facility];  Service:    • HYSTERECTOMY  2021   • INDUCED       By Dilation and Evacuation   • OOPHORECTOMY Left 2021    Procedure: [facility];  Surgeon: Zach You MD;  [facility];  Service: Gynecology   • IA CYSTOURETHROSCOPY N/A 2021    Procedure: [facility];  Surgeon: Zach You MD;  [facility];  Service: Gynecology   • IA LAPS ABD PRTM&OMENTUM DX W/WO SPEC BR/WA [address] [facility] N/A 2021    Procedure: LAPAROSCOPY DIAGNOSTIC;  Surgeon: Zach You MD;  [facility];  Service: Gynecology   • IA VAG HYST 250 GM/< W/RMVL TUBE&/OVARY N/A 2021    Procedure: HYSTERECTOMY VAGINAL TOTAL ([facility]) WITH BILATERAL SALPINGECTOMY;  Surgeon: Zach You MD;  [facility];  Service: Gynecology      Social History:     Social History     Socioeconomic History   • Marital status: /Civil Union     Spouse name: None   • Number of children: None   • Years of education: None   • Highest education level: None   Occupational History   • None   Tobacco Use   • Smoking status: Former     Packs/day: 1.00     Years: 15.00     Total pack years: 15.00     Types: Cigarettes   • Smokeless tobacco: Former     Quit date: 2003   Vaping Use   • Vaping Use: Never used   Substance and Sexual Activity   • Alcohol use: Yes     Comment: socially   • Drug use: Never   • Sexual activity: Yes     Partners: Male     Birth control/protection: Post-menopausal, Surgical, None   Other Topics Concern   • None   Social History Narrative    Daily coffee consumption , 2 cups/day    Exercise regularly     Social Determinants of Health     Financial Resource Strain: Not on file   Food Insecurity: Not on file   Transportation Needs: Not on file   Physical Activity: Not on file   Stress: Not on file   Social Connections: Not on file   Intimate Partner Violence: Not on file   Housing Stability: Not on file      Family History:     Family History   Problem Relation Age of Onset   • Hypothyroidism Mother    • Lung cancer Mother 72   • Cancer Mother    • Heart disease Father    • Venous thrombosis Father         of Deep Vessels of Lower Extremity   • Aortic aneurysm Father    • Breast cancer Maternal Aunt 46   • No Known Problems Maternal Aunt    • No Known Problems Maternal Aunt    • Breast cancer Paternal Aunt 48   • Breast cancer Paternal Aunt 46   • No Known Problems Paternal Aunt    • No Known Problems Paternal Aunt    • Esophageal cancer Paternal Uncle 61   • Breast cancer Paternal Grandmother    • Hypertension Paternal Grandfather    • Diabetes Family    • Cancer Family       Current Medications:     Current Outpatient Medications   Medication Sig Dispense Refill   • escitalopram (LEXAPRO) 20 mg tablet TAKE 1 TABLET BY MOUTH EVERY DAY 90 tablet 0   • Magnesium Gluconate (MAGNESIUM 27 PO) Take by mouth       No current facility-administered medications for this visit. Allergies:     No Known Allergies   Physical Exam:     /78 (BP Location: Left arm, Patient Position: Sitting, Cuff Size: Large)   Pulse 87   Temp 98 °F (36.7 °C)   Ht 5' 8" (1.727 m)   Wt 110 kg (243 lb 9.6 oz)   LMP 12/27/2020 (Exact Date)   SpO2 98%   BMI 37.04 kg/m²     Physical Exam  Vitals and nursing note reviewed. Constitutional:       General: She is not in acute distress. Appearance: Normal appearance. She is not ill-appearing or diaphoretic. HENT:      Head: Normocephalic and atraumatic. Right Ear: Tympanic membrane, ear canal and external ear normal.      Left Ear: Tympanic membrane, ear canal and external ear normal.      Nose: Nose normal. No congestion or rhinorrhea. Mouth/Throat:      Mouth: Mucous membranes are moist.      Pharynx: Oropharynx is clear. No posterior oropharyngeal erythema. Eyes:      General:         Right eye: No discharge.          Left eye: No discharge. Extraocular Movements: Extraocular movements intact. Conjunctiva/sclera: Conjunctivae normal.      Pupils: Pupils are equal, round, and reactive to light. Neck:      Vascular: No carotid bruit. Cardiovascular:      Rate and Rhythm: Normal rate and regular rhythm. Pulses: Normal pulses. Heart sounds: Normal heart sounds. No murmur heard. Pulmonary:      Effort: Pulmonary effort is normal. No respiratory distress. Breath sounds: Normal breath sounds. No wheezing or rhonchi. Abdominal:      General: Abdomen is flat. Bowel sounds are normal. There is no distension. Palpations: There is no mass. Tenderness: There is no abdominal tenderness. Musculoskeletal:         General: No swelling or deformity. Normal range of motion. Cervical back: Normal range of motion and neck supple. No rigidity. Right lower leg: No edema. Left lower leg: No edema. Lymphadenopathy:      Cervical: No cervical adenopathy. Skin:     General: Skin is warm and dry. Capillary Refill: Capillary refill takes less than 2 seconds. Coloration: Skin is not jaundiced. Findings: No bruising, erythema or rash. Neurological:      General: No focal deficit present. Mental Status: She is alert and oriented to person, place, and time. Cranial Nerves: No cranial nerve deficit. Sensory: No sensory deficit. Gait: Gait normal.      Deep Tendon Reflexes: Reflexes normal.   Psychiatric:         Mood and Affect: Mood normal.         Behavior: Behavior normal.         Thought Content:  Thought content normal.         Judgment: Judgment normal.          Taina Good, Jina Adi PAULSON Name band;

## 2025-06-23 ENCOUNTER — RESULTS FOLLOW-UP (OUTPATIENT)
Dept: GASTROENTEROLOGY | Facility: CLINIC | Age: 55
End: 2025-06-23

## 2025-07-22 ENCOUNTER — OFFICE VISIT (OUTPATIENT)
Dept: GASTROENTEROLOGY | Facility: CLINIC | Age: 55
End: 2025-07-22
Payer: COMMERCIAL

## 2025-07-22 ENCOUNTER — TELEPHONE (OUTPATIENT)
Dept: GASTROENTEROLOGY | Facility: CLINIC | Age: 55
End: 2025-07-22

## 2025-07-22 VITALS
SYSTOLIC BLOOD PRESSURE: 138 MMHG | HEIGHT: 67 IN | DIASTOLIC BLOOD PRESSURE: 76 MMHG | WEIGHT: 245.4 LBS | BODY MASS INDEX: 38.52 KG/M2 | TEMPERATURE: 98.1 F

## 2025-07-22 DIAGNOSIS — K52.832 LYMPHOCYTIC COLITIS: ICD-10-CM

## 2025-07-22 DIAGNOSIS — K52.9 CHRONIC DIARRHEA: ICD-10-CM

## 2025-07-22 DIAGNOSIS — Z86.0100 HISTORY OF COLON POLYPS: ICD-10-CM

## 2025-07-22 DIAGNOSIS — K52.832 LYMPHOCYTIC COLITIS: Primary | ICD-10-CM

## 2025-07-22 PROCEDURE — 99214 OFFICE O/P EST MOD 30 MIN: CPT | Performed by: INTERNAL MEDICINE

## 2025-07-22 RX ORDER — CHOLESTYRAMINE 4 G/9G
1 POWDER, FOR SUSPENSION ORAL 2 TIMES DAILY WITH MEALS
Qty: 60 PACKET | Refills: 5 | Status: SHIPPED | OUTPATIENT
Start: 2025-07-22 | End: 2026-01-18

## 2025-07-22 RX ORDER — CHOLESTYRAMINE 4 G/9G
POWDER, FOR SUSPENSION ORAL
Qty: 60 PACKET | Refills: 5 | OUTPATIENT
Start: 2025-07-22

## 2025-07-22 NOTE — PROGRESS NOTES
Name: Xenia Jones      : 1970      MRN: [id number]  Encounter Provider: Bravo Burgos MD  Encounter Date: 2025   Encounter department: [facility] [place]  :  Assessment & Plan  Lymphocytic colitis  Patient continues to have uncontrolled loose stools.  About 6-8 bowel movements per day.  Budesonide did improve the urgency but not the frequency of the bowel movements.  Will recheck fecal calprotectin and start patient on trial of cholestyramine.  If bowel movements are not improved after 2 weeks, patient should reach out and we can discuss alternative treatments.  Patient agreeable with plan.  Ordered the medication and lab.  Regarding history of colon polyps, patient is up-to-date with surveillance colonoscopies and repeat colonoscopy will be due in .  Orders:    cholestyramine (QUESTRAN) 4 g packet; Take 1 packet (4 g total) by mouth 2 (two) times a day with meals    Calprotectin,Fecal; Future    Chronic diarrhea  As above.  Orders:    cholestyramine (QUESTRAN) 4 g packet; Take 1 packet (4 g total) by mouth 2 (two) times a day with meals    History of colon polyps  As above.    Bravo Burgos MD  Gastroenterology  [facility]  Date: 2025             History of Present Illness   Diarrhea   Associated symptoms include abdominal pain. Pertinent negatives include no arthralgias, chills, coughing, fever or vomiting.   Abdominal Pain  Associated symptoms include diarrhea. Pertinent negatives include no arthralgias, dysuria, fever, hematuria or vomiting.   Fatigue  Associated symptoms include abdominal pain and fatigue. Pertinent negatives include no arthralgias, chest pain, chills, coughing, fever, rash, sore throat or vomiting.     Xenia Jones is a 55 y.o. female with lymphocytic colitis, mood disorder, NAFLD presents for follow-up.    During last office visit patient reported Lexapro helps control her mood symptoms.  I  "discussed with her about association of Lexapro with lymphocytic colitis.  We repeated colonoscopy thereafter in January 2025 which showed ongoing uncontrolled lymphocytic colitis.  Recommendation was was made to do a trial without Lexapro and start patient on budesonide with a long taper.    Patient reports that she tried to come off of Lexapro but could not do it because of worsening mood symptoms so restarted it.  She did take budesonide with long taper and finished it last month.  She reports that urgency has decreased but she still has 4-6 bowel movements per day.  No blood in stools.  The bowel movements are watery.    Patient had stool workup labs done in June 2024 which were reviewed by me today and showed high fecal calprotectin while the labs did not show any infection.      Review of Systems   Constitutional:  Positive for fatigue. Negative for chills and fever.   HENT:  Negative for ear pain and sore throat.    Eyes:  Negative for pain and visual disturbance.   Respiratory:  Negative for cough and shortness of breath.    Cardiovascular:  Negative for chest pain and palpitations.   Gastrointestinal:  Positive for abdominal pain and diarrhea. Negative for vomiting.   Genitourinary:  Negative for dysuria and hematuria.   Musculoskeletal:  Negative for arthralgias and back pain.   Skin:  Negative for color change and rash.   Neurological:  Negative for seizures and syncope.   All other systems reviewed and are negative.         Objective   /76   Temp 98.1 °F (36.7 °C) (Axillary)   Ht 5' 7\" (1.702 m)   Wt 111 kg (245 lb 6.4 oz)   LMP 12/27/2020 (Exact Date)   BMI 38.44 kg/m²      Physical Exam  Vitals and nursing note reviewed.   Constitutional:       General: She is not in acute distress.     Appearance: She is well-developed.   HENT:      Head: Normocephalic and atraumatic.     Eyes:      Conjunctiva/sclera: Conjunctivae normal.       Cardiovascular:      Rate and Rhythm: Normal rate and regular " rhythm.      Heart sounds: No murmur heard.  Pulmonary:      Effort: Pulmonary effort is normal. No respiratory distress.      Breath sounds: Normal breath sounds.   Abdominal:      Palpations: Abdomen is soft.      Tenderness: There is no abdominal tenderness.     Musculoskeletal:         General: No swelling.      Cervical back: Neck supple.     Skin:     General: Skin is warm and dry.      Capillary Refill: Capillary refill takes less than 2 seconds.     Neurological:      Mental Status: She is alert.     Psychiatric:         Mood and Affect: Mood normal.

## 2025-07-22 NOTE — TELEPHONE ENCOUNTER
Forwarding to PA team for review.   Changes Requested     Name from pharmacy: CHOLESTYRAMINE PACKET         Will file in chart as: cholestyramine (QUESTRAN) 4 g packet    Sig: TAKE 1 PACKET BY MOUTH 2 TIMES A DAY WITH MEALS.    Disp: 60 packet    Refills: 5    Start: 7/22/2025    Class: Normal    Non-formulary For: Lymphocytic colitis, Chronic diarrhea    Last ordered: Today (7/22/2025) by Bravo Burgos MD    Last refill: 7/22/2025    Rx #: [id number]    Pharmacy comment: Alternative Requested:DRUG NOT COVERED BY INSURANCE NEED TO CHANGE.    Cardiovascular:  Antilipid - Bile Acid Sequestrants [id number]/22/2025 03:43 PM   Protocol Details Total Cholesterol within 360 days    LDL within 360 days    HDL within 360 days    Triglycerides within 360 days    Valid encounter within last 12 months   [facility] Embedded [id number]/22/2025 03:43 PM   This is a duplicate request of medication ordered 2025-07-22. Check to see if the patient is requesting a refill from a new pharmacy.      This request has changes from the previous prescription.   To be filled at: [id number] [place] [id number]

## 2025-07-23 NOTE — TELEPHONE ENCOUNTER
Called pharmacy  [facility] [id number]   ID [id number]  PHONE NUMBER OF INSURANCE   NAME OF INSURANCE [facility]    Pharmacist states when running script, states pt has to use mail order for the  med to be aprpoved    Called pt and advised. Pt will call her mail order and have med transferred and will make sure no pa is needed, if one is needed pt stated will contact the office.

## 2025-08-14 ENCOUNTER — TELEPHONE (OUTPATIENT)
Age: 55
End: 2025-08-14

## 2025-08-17 DIAGNOSIS — K52.832 LYMPHOCYTIC COLITIS: ICD-10-CM

## 2025-08-17 RX ORDER — COLESTIPOL HYDROCHLORIDE 1 G/1
1 TABLET ORAL 2 TIMES DAILY
Qty: 60 TABLET | Refills: 2 | Status: SHIPPED | OUTPATIENT
Start: 2025-08-17 | End: 2025-11-15

## (undated) DEVICE — CYSTO TUBING SINGLE IRRIGATION

## (undated) DEVICE — SYRINGE 50ML LL

## (undated) DEVICE — TROCAR: Brand: KII® SLEEVE

## (undated) DEVICE — TRAY FOLEY 16FR URIMETER SILICONE SURESTEP

## (undated) DEVICE — INTENDED FOR TISSUE SEPARATION, AND OTHER PROCEDURES THAT REQUIRE A SHARP SURGICAL BLADE TO PUNCTURE OR CUT.: Brand: BARD-PARKER ® CARBON RIB-BACK BLADES

## (undated) DEVICE — SUT MONOCRYL 4-0 PS-2 27 IN Y426H

## (undated) DEVICE — STERILE SURGICAL LUBRICANT,  TUBE: Brand: SURGILUBE

## (undated) DEVICE — ENSEAL 20 CM SHAFT, LARGE JAW: Brand: ENSEAL X1

## (undated) DEVICE — NEEDLE SPINAL 22G X 3.5IN  QUINCKE

## (undated) DEVICE — SCD SEQUENTIAL COMPRESSION COMFORT SLEEVE MEDIUM KNEE LENGTH: Brand: KENDALL SCD

## (undated) DEVICE — GLOVE PI ULTRA TOUCH SZ.6.5

## (undated) DEVICE — HEAVY DUTY TABLE COVER: Brand: CONVERTORS

## (undated) DEVICE — LIGHT HANDLE COVER SLEEVE DISP BLUE STELLAR

## (undated) DEVICE — TUBING SUCTION 5MM X 12 FT

## (undated) DEVICE — TROCAR: Brand: KII FIOS FIRST ENTRY

## (undated) DEVICE — MEDI-VAC YANK SUCT HNDL W/TPRD BULBOUS TIP: Brand: CARDINAL HEALTH

## (undated) DEVICE — CHLORAPREP HI-LITE 26ML ORANGE

## (undated) DEVICE — ENSEAL LAPAROSCOPIC TISSUE SEALER G2 STRAIGHT JAW FOR USE WITH G2 GENERATOR 5MM DIAMETER 35CM SHAFT LENGTH: Brand: ENSEAL

## (undated) DEVICE — SYRINGE 10ML LL

## (undated) DEVICE — GLOVE INDICATOR PI UNDERGLOVE SZ 8 BLUE

## (undated) DEVICE — GLOVE SRG BIOGEL ECLIPSE 7.5

## (undated) DEVICE — CHLORHEXIDINE 4PCT 4 OZ

## (undated) DEVICE — BETHLEHEM UNIVERSAL MINOR VAG: Brand: CARDINAL HEALTH

## (undated) DEVICE — SUT VICRYL 0 CT-1 CR/8 27 IN JJ41G

## (undated) DEVICE — SURGIFLO ENDOSCOPIC APPICATOR: Brand: ETHICON

## (undated) DEVICE — ADHESIVE SKIN HIGH VISCOSITY EXOFIN 1ML

## (undated) DEVICE — TISSUE RETRIEVAL SYSTEM: Brand: INZII RETRIEVAL SYSTEM

## (undated) DEVICE — NOVASURE KIT

## (undated) DEVICE — STRL UNIVERSAL MINOR VAGINAL: Brand: CARDINAL HEALTH

## (undated) DEVICE — TOWEL SURG XR DETECT GREEN STRL RFD

## (undated) DEVICE — MAYO STAND COVER: Brand: CONVERTORS

## (undated) DEVICE — HEMOSTATIC MATRIX SURGIFLO 8ML W/THROMBIN

## (undated) DEVICE — ARTHROSCOPY FLOOR MAT

## (undated) DEVICE — INTENDED FOR TISSUE SEPARATION, AND OTHER PROCEDURES THAT REQUIRE A SHARP SURGICAL BLADE TO PUNCTURE OR CUT.: Brand: BARD-PARKER SAFETY BLADES SIZE 11, STERILE

## (undated) DEVICE — PREMIUM DRY TRAY LF: Brand: MEDLINE INDUSTRIES, INC.

## (undated) DEVICE — GLOVE INDICATOR PI UNDERGLOVE SZ 7 BLUE

## (undated) DEVICE — PVC URETHRAL CATHETER: Brand: DOVER

## (undated) DEVICE — BETHLEHEM UNIVERSAL GYN LAP PK: Brand: CARDINAL HEALTH

## (undated) DEVICE — NEEDLE 22 G X 1 1/2 SAFETY

## (undated) DEVICE — ELECTRODE BLADE MOD  E-Z CLEAN 6.5IN -0014M